# Patient Record
Sex: MALE | Race: WHITE | Employment: FULL TIME | URBAN - METROPOLITAN AREA
[De-identification: names, ages, dates, MRNs, and addresses within clinical notes are randomized per-mention and may not be internally consistent; named-entity substitution may affect disease eponyms.]

---

## 2018-03-02 ENCOUNTER — OFFICE VISIT (OUTPATIENT)
Dept: FAMILY MEDICINE CLINIC | Age: 53
End: 2018-03-02

## 2018-03-02 VITALS
BODY MASS INDEX: 24.45 KG/M2 | SYSTOLIC BLOOD PRESSURE: 128 MMHG | RESPIRATION RATE: 18 BRPM | HEIGHT: 76 IN | WEIGHT: 200.8 LBS | OXYGEN SATURATION: 98 % | TEMPERATURE: 98.2 F | DIASTOLIC BLOOD PRESSURE: 84 MMHG | HEART RATE: 72 BPM

## 2018-03-02 DIAGNOSIS — Z86.718 HISTORY OF DVT (DEEP VEIN THROMBOSIS): ICD-10-CM

## 2018-03-02 DIAGNOSIS — I87.322 CHRONIC VENOUS HYPERTENSION WITH INFLAMMATION INVOLVING LEFT SIDE: ICD-10-CM

## 2018-03-02 DIAGNOSIS — I10 ESSENTIAL HYPERTENSION: ICD-10-CM

## 2018-03-02 DIAGNOSIS — D68.51 FACTOR 5 LEIDEN MUTATION, HETEROZYGOUS (HCC): ICD-10-CM

## 2018-03-02 DIAGNOSIS — Z12.11 SCREEN FOR COLON CANCER: ICD-10-CM

## 2018-03-02 DIAGNOSIS — E11.9 TYPE 2 DIABETES MELLITUS WITHOUT COMPLICATION, WITHOUT LONG-TERM CURRENT USE OF INSULIN (HCC): Primary | ICD-10-CM

## 2018-03-02 PROCEDURE — 99203 OFFICE O/P NEW LOW 30 MIN: CPT | Performed by: FAMILY MEDICINE

## 2018-03-02 RX ORDER — UBIQUINOL 100 MG
CAPSULE ORAL
COMMUNITY
Start: 2017-02-07 | End: 2021-07-16

## 2018-03-02 RX ORDER — LANCETS
EACH MISCELLANEOUS
COMMUNITY
Start: 2017-02-07

## 2018-03-02 RX ORDER — METFORMIN HYDROCHLORIDE 500 MG/1
TABLET, EXTENDED RELEASE ORAL
Qty: 60 TABLET | Refills: 5 | Status: SHIPPED | OUTPATIENT
Start: 2018-03-02 | End: 2018-07-13 | Stop reason: DRUGHIGH

## 2018-03-02 RX ORDER — LOSARTAN POTASSIUM 50 MG/1
TABLET ORAL
COMMUNITY
Start: 2017-11-16 | End: 2018-06-13 | Stop reason: ALTCHOICE

## 2018-03-02 RX ORDER — BLOOD-GLUCOSE METER
1 EACH MISCELLANEOUS
COMMUNITY
Start: 2017-02-07

## 2018-03-02 RX ORDER — METFORMIN HYDROCHLORIDE 500 MG/1
TABLET, EXTENDED RELEASE ORAL
COMMUNITY
Start: 2017-11-16 | End: 2018-03-02 | Stop reason: SDUPTHER

## 2018-03-02 ASSESSMENT — PATIENT HEALTH QUESTIONNAIRE - PHQ9
SUM OF ALL RESPONSES TO PHQ9 QUESTIONS 1 & 2: 0
1. LITTLE INTEREST OR PLEASURE IN DOING THINGS: 0
2. FEELING DOWN, DEPRESSED OR HOPELESS: 0
SUM OF ALL RESPONSES TO PHQ QUESTIONS 1-9: 0

## 2018-03-02 ASSESSMENT — ENCOUNTER SYMPTOMS
SORE THROAT: 0
EYE REDNESS: 0
COLOR CHANGE: 0
SINUS PRESSURE: 0
DIARRHEA: 0
COUGH: 0
SHORTNESS OF BREATH: 0
VOMITING: 0
NAUSEA: 0

## 2018-03-05 ENCOUNTER — TELEPHONE (OUTPATIENT)
Dept: PHARMACY | Facility: CLINIC | Age: 53
End: 2018-03-05

## 2018-03-06 ENCOUNTER — TELEPHONE (OUTPATIENT)
Dept: PHARMACY | Facility: CLINIC | Age: 53
End: 2018-03-06

## 2018-03-09 DIAGNOSIS — E11.9 TYPE 2 DIABETES MELLITUS WITHOUT COMPLICATION, WITHOUT LONG-TERM CURRENT USE OF INSULIN (HCC): ICD-10-CM

## 2018-03-09 LAB
INR BLD: 2.13 (ref 0.85–1.15)
PROTHROMBIN TIME: 24.1 SEC (ref 9.6–13)

## 2018-03-23 RX ORDER — ATENOLOL 50 MG/1
50 TABLET ORAL DAILY
Qty: 30 TABLET | Refills: 5 | Status: SHIPPED | OUTPATIENT
Start: 2018-03-23 | End: 2019-03-20 | Stop reason: SDUPTHER

## 2018-03-23 RX ORDER — ATENOLOL 50 MG/1
50 TABLET ORAL DAILY
Qty: 30 TABLET | Refills: 5 | Status: SHIPPED | OUTPATIENT
Start: 2018-03-23 | End: 2018-03-23 | Stop reason: SDUPTHER

## 2018-03-23 RX ORDER — ATENOLOL 50 MG/1
50 TABLET ORAL DAILY
Qty: 30 TABLET | OUTPATIENT
Start: 2018-03-23

## 2018-03-23 NOTE — TELEPHONE ENCOUNTER
Can we confirm dose with pharmacy first? Pt was unsure at his new patient appt and it is still unclear. Thanks.

## 2018-04-11 DIAGNOSIS — Z86.718 HISTORY OF DVT (DEEP VEIN THROMBOSIS): Primary | ICD-10-CM

## 2018-04-11 DIAGNOSIS — D68.51 FACTOR 5 LEIDEN MUTATION, HETEROZYGOUS (HCC): ICD-10-CM

## 2018-04-11 RX ORDER — WARFARIN SODIUM 5 MG/1
TABLET ORAL
Qty: 45 TABLET | Refills: 1 | Status: SHIPPED | OUTPATIENT
Start: 2018-04-11 | End: 2018-06-27 | Stop reason: SDUPTHER

## 2018-04-11 RX ORDER — WARFARIN SODIUM 5 MG/1
TABLET ORAL
Qty: 30 TABLET | Refills: 3 | OUTPATIENT
Start: 2018-04-11

## 2018-05-21 ENCOUNTER — OFFICE VISIT (OUTPATIENT)
Dept: FAMILY MEDICINE CLINIC | Age: 53
End: 2018-05-21

## 2018-05-21 VITALS
DIASTOLIC BLOOD PRESSURE: 78 MMHG | SYSTOLIC BLOOD PRESSURE: 124 MMHG | BODY MASS INDEX: 24.44 KG/M2 | HEART RATE: 68 BPM | RESPIRATION RATE: 18 BRPM | HEIGHT: 76 IN | OXYGEN SATURATION: 99 % | TEMPERATURE: 98.3 F | WEIGHT: 200.7 LBS

## 2018-05-21 DIAGNOSIS — M25.512 ACUTE PAIN OF LEFT SHOULDER: Primary | ICD-10-CM

## 2018-05-21 DIAGNOSIS — E11.9 TYPE 2 DIABETES MELLITUS WITHOUT COMPLICATION, WITHOUT LONG-TERM CURRENT USE OF INSULIN (HCC): ICD-10-CM

## 2018-05-21 PROCEDURE — 99212 OFFICE O/P EST SF 10 MIN: CPT | Performed by: FAMILY MEDICINE

## 2018-05-21 RX ORDER — HYDROCODONE BITARTRATE AND ACETAMINOPHEN 5; 325 MG/1; MG/1
1 TABLET ORAL EVERY 6 HOURS PRN
Qty: 20 TABLET | Refills: 0 | Status: SHIPPED | OUTPATIENT
Start: 2018-05-21 | End: 2018-06-07 | Stop reason: SDUPTHER

## 2018-05-23 DIAGNOSIS — D68.51 FACTOR 5 LEIDEN MUTATION, HETEROZYGOUS (HCC): ICD-10-CM

## 2018-05-23 DIAGNOSIS — Z86.718 HISTORY OF DVT (DEEP VEIN THROMBOSIS): ICD-10-CM

## 2018-05-23 LAB
INR BLD: 2.76 (ref 0.85–1.15)
PROTHROMBIN TIME: 31.2 SEC (ref 9.6–13)

## 2018-05-24 ENCOUNTER — HOSPITAL ENCOUNTER (OUTPATIENT)
Dept: MRI IMAGING | Age: 53
Discharge: HOME | End: 2018-05-24
Attending: FAMILY MEDICINE | Admitting: FAMILY MEDICINE

## 2018-05-24 ENCOUNTER — TELEPHONE (OUTPATIENT)
Dept: FAMILY MEDICINE CLINIC | Age: 53
End: 2018-05-24

## 2018-05-24 DIAGNOSIS — M25.512 ACUTE PAIN OF LEFT SHOULDER: ICD-10-CM

## 2018-05-24 DIAGNOSIS — M25.512 PAIN IN LEFT SHOULDER: ICD-10-CM

## 2018-06-01 ENCOUNTER — TELEPHONE (OUTPATIENT)
Dept: FAMILY MEDICINE CLINIC | Age: 53
End: 2018-06-01

## 2018-06-01 ENCOUNTER — HOSPITAL ENCOUNTER (OUTPATIENT)
Dept: OTHER | Age: 53
Discharge: HOME | End: 2018-06-30
Attending: FAMILY MEDICINE | Admitting: FAMILY MEDICINE

## 2018-06-01 ENCOUNTER — OFFICE VISIT (OUTPATIENT)
Dept: ORTHOPEDIC SURGERY | Age: 53
End: 2018-06-01

## 2018-06-01 VITALS
HEART RATE: 77 BPM | BODY MASS INDEX: 25.74 KG/M2 | SYSTOLIC BLOOD PRESSURE: 121 MMHG | DIASTOLIC BLOOD PRESSURE: 73 MMHG | WEIGHT: 207 LBS | HEIGHT: 75 IN | RESPIRATION RATE: 16 BRPM

## 2018-06-01 DIAGNOSIS — Z86.718 HISTORY OF DVT (DEEP VEIN THROMBOSIS): ICD-10-CM

## 2018-06-01 DIAGNOSIS — M19.019 AC JOINT ARTHROPATHY: ICD-10-CM

## 2018-06-01 DIAGNOSIS — D68.51 FACTOR 5 LEIDEN MUTATION, HETEROZYGOUS (HCC): Primary | ICD-10-CM

## 2018-06-01 DIAGNOSIS — S46.012A TRAUMATIC COMPLETE TEAR OF LEFT ROTATOR CUFF, INITIAL ENCOUNTER: Primary | ICD-10-CM

## 2018-06-01 DIAGNOSIS — M67.922 BICEPS TENDINOPATHY, LEFT: ICD-10-CM

## 2018-06-01 PROCEDURE — 99214 OFFICE O/P EST MOD 30 MIN: CPT | Performed by: ORTHOPAEDIC SURGERY

## 2018-06-04 ENCOUNTER — OFFICE VISIT (OUTPATIENT)
Dept: FAMILY MEDICINE CLINIC | Age: 53
End: 2018-06-04

## 2018-06-04 ENCOUNTER — TELEPHONE (OUTPATIENT)
Dept: PHARMACY | Facility: CLINIC | Age: 53
End: 2018-06-04

## 2018-06-04 ENCOUNTER — TELEPHONE (OUTPATIENT)
Dept: FAMILY MEDICINE CLINIC | Age: 53
End: 2018-06-04

## 2018-06-04 VITALS
SYSTOLIC BLOOD PRESSURE: 116 MMHG | RESPIRATION RATE: 16 BRPM | HEIGHT: 75 IN | DIASTOLIC BLOOD PRESSURE: 80 MMHG | OXYGEN SATURATION: 100 % | WEIGHT: 207.6 LBS | BODY MASS INDEX: 25.81 KG/M2 | HEART RATE: 76 BPM | TEMPERATURE: 98.3 F

## 2018-06-04 DIAGNOSIS — J06.9 VIRAL URI WITH COUGH: ICD-10-CM

## 2018-06-04 DIAGNOSIS — E11.9 TYPE 2 DIABETES MELLITUS WITHOUT COMPLICATION, WITHOUT LONG-TERM CURRENT USE OF INSULIN (HCC): ICD-10-CM

## 2018-06-04 DIAGNOSIS — M75.122 COMPLETE TEAR OF LEFT ROTATOR CUFF: ICD-10-CM

## 2018-06-04 DIAGNOSIS — D68.51 FACTOR 5 LEIDEN MUTATION, HETEROZYGOUS (HCC): ICD-10-CM

## 2018-06-04 DIAGNOSIS — I10 ESSENTIAL HYPERTENSION: Primary | ICD-10-CM

## 2018-06-04 DIAGNOSIS — Z86.718 HISTORY OF DVT (DEEP VEIN THROMBOSIS): ICD-10-CM

## 2018-06-04 PROCEDURE — 99214 OFFICE O/P EST MOD 30 MIN: CPT | Performed by: FAMILY MEDICINE

## 2018-06-04 RX ORDER — DEXTROMETHORPHAN HYDROBROMIDE AND PROMETHAZINE HYDROCHLORIDE 15; 6.25 MG/5ML; MG/5ML
5 SYRUP ORAL 4 TIMES DAILY PRN
Qty: 1 BOTTLE | Refills: 0 | Status: SHIPPED | OUTPATIENT
Start: 2018-06-04 | End: 2018-06-04 | Stop reason: SDUPTHER

## 2018-06-04 ASSESSMENT — ENCOUNTER SYMPTOMS
COUGH: 1
SHORTNESS OF BREATH: 0
CONSTIPATION: 0
DIARRHEA: 0

## 2018-06-05 ENCOUNTER — TELEPHONE (OUTPATIENT)
Dept: FAMILY MEDICINE CLINIC | Age: 53
End: 2018-06-05

## 2018-06-05 RX ORDER — DEXTROMETHORPHAN HYDROBROMIDE AND PROMETHAZINE HYDROCHLORIDE 15; 6.25 MG/5ML; MG/5ML
5 SYRUP ORAL 4 TIMES DAILY PRN
Qty: 1 BOTTLE | Refills: 0 | Status: SHIPPED | OUTPATIENT
Start: 2018-06-05 | End: 2018-06-12

## 2018-06-06 ENCOUNTER — HOSPITAL ENCOUNTER (OUTPATIENT)
Dept: OTHER | Age: 53
Discharge: HOME | End: 2018-06-30
Attending: ORTHOPAEDIC SURGERY | Admitting: ORTHOPAEDIC SURGERY

## 2018-06-06 NOTE — PLAN OF CARE
[address], [address] [place], [address]  Phone: [phone]   Fax: [phone]          Physical Therapy Certification    Dear Referring Practitioner: Ciro Conner,    We had the pleasure of evaluating the following patient for physical therapy services at [address] [place] [address]. A summary of our findings can be found in the initial assessment below. This includes our plan of care. If you have any questions or concerns regarding these findings, please do not hesitate to contact me at the office phone number checked above. Thank you for the referral.       Physician Signature:_______________________________Date:__________________  By signing above (or electronic signature), therapists plan is approved by physician      Patient: Spencer Bee   : 1965   MRN: [id number]  Referring Physician: Referring Practitioner: Ciro Conner      Evaluation Date: 2018      Medical Diagnosis Information:  Diagnosis: J89.770 (ICD-10-CM) - Tear of left supraspinatus tendon   Treatment Diagnosis: M25.512- left shoulder pain                                           Precautions/ Contra-indications: Factor V, diabetes, HTN, neck fusions  Latex Allergy:  [x]NO      []YES  Preferred Language for Healthcare:   [x]English       []other:    SUBJECTIVE: Patient stated complaint: Pt slipped in wet grass. He fell and tried to catch himself, and he tore his RTC. Pt is RHD. The injury occurred 2 weeks ago. He went to the ED on Saturday evening. He had an x-ray and MRI after that. He went to see Dr. Ciro Conner for ortho consult. They are going to do surgery, but they are going to do it later. He was told by the MD that he has a water pocket in his shoulder that is causing most of his pain. Relevant Medical History:see above. Functional Disability Index:  UEFI    Pain Scale: 8/10  Easing factors: cyclobencpren and hydrocone, which helps.   Pain at measurable assessment of functional outcome.     [x] EVAL (LOW) 95799 (typically 20 minutes face-to-face)  [] EVAL (MOD) 84246 (typically 30 minutes face-to-face)  [] EVAL (HIGH) 35017 (typically 45 minutes face-to-face)  [] Phyllis Muñoz        Electronically signed by:  Price Zuñiga, PT, DPT [id number]

## 2018-06-06 NOTE — FLOWSHEET NOTE
10x:10 Cane ER seated   Table Slides 10x:10 Flex/scap   Wall slides      UE [place]     Pulleys     Pendulum     BB IR 10x:10 Cane    SL IR     Pec doorway stretch     CBA stretch     UT stretch 3x:30    LS stretch 3x:30                        Isometrics     Retraction 10x:10         Weight shift     Flexion     Abduction     External Rotation     Internal Rotation     Biceps     Triceps          PRE's     Flexion     Abduction     External Rotation     Internal Rotation     Shrugs     EXT     Reverse Flys     Serratus     Horizontal Abd with ER     Biceps     Triceps     Retraction          Cable Column/Theraband     External Rotation     Internal Rotation     Shrugs     Lats     Ext     Flex     Scapular Retraction     BIC     TRIC     PNF          Dynamic Stability          Plyoback            Pt Education: Patient education on PT and plan of care including diagnosis, prognosis, treatment goals and options. Patient agrees with discussed POC and treatment and is aware of rehab process. Pt was also educated on clinic layout and use of modalities. PT gave pt business card to call if any questions. Therapeutic Exercise and NMR EXR  [x] (77666) Provided verbal/tactile cueing for activities related to strengthening, flexibility, endurance, ROM  for improvements in scapular, scapulothoracic and UE control with self care, reaching, carrying, lifting, house/yardwork, driving/computer work.    [] (79668) Provided verbal/tactile cueing for activities related to improving balance, coordination, kinesthetic sense, posture, motor skill, proprioception  to assist with  scapular, scapulothoracic and UE control with self care, reaching, carrying, lifting, house/yardwork, driving/computer work.     Therapeutic Activities:    [] (30998 or 68519) Provided verbal/tactile cueing for activities related to improving balance, coordination, kinesthetic sense, posture, motor skill, proprioception and motor activation to allow

## 2018-06-07 DIAGNOSIS — M25.512 ACUTE PAIN OF LEFT SHOULDER: ICD-10-CM

## 2018-06-07 RX ORDER — HYDROCODONE BITARTRATE AND ACETAMINOPHEN 5; 325 MG/1; MG/1
1 TABLET ORAL EVERY 6 HOURS PRN
Qty: 20 TABLET | Refills: 0 | Status: SHIPPED | OUTPATIENT
Start: 2018-06-07 | End: 2018-06-12

## 2018-06-13 ENCOUNTER — ANTI-COAG VISIT (OUTPATIENT)
Dept: PHARMACY | Facility: CLINIC | Age: 53
End: 2018-06-13

## 2018-06-13 LAB — INTERNATIONAL NORMALIZATION RATIO, POC: 4.4

## 2018-06-14 ENCOUNTER — HOSPITAL ENCOUNTER (OUTPATIENT)
Dept: PHYSICAL THERAPY | Age: 53
Discharge: HOME | End: 2018-06-15
Admitting: ORTHOPAEDIC SURGERY

## 2018-06-14 NOTE — FLOWSHEET NOTE
Orthopaedics and [address] [place] [address]      Physical Therapy Daily Treatment Note  Date:  2018    Patient Name:  Daquan Hitchcock    :  1965  MRN: [id number]  Medical/Treatment Diagnosis Information:  · Diagnosis: L81.705 (ICD-10-CM) - Tear of left supraspinatus tendon. Onset- middle May 2018  · Treatment Diagnosis: M25.512- left shoulder pain  Insurance/Certification information:  PT Insurance Information: [employer]  Physician Information:  Referring Practitioner: Malachi Horne  Plan of care signed (Y/N):     Date of Patient follow up with Physician:  Pt to f/u with PCP for return to work    G-Code (if applicable):      Date G-Code Applied:  2018       Progress Note: [x]  Yes  []  No  Next due by: Visit #10 or 2018     Latex Allergy:  [x]NO      []YES  Preferred Language for Healthcare:   [x]English       []other:    Visit # Insurance Allowable   2 40     Pain level:  4/10 at rest, 5/10     SUBJECTIVE:  Pt notes he feels somewhat better but is still fairly sore. He notes that he is able to reach up better to wash hair and is able to use left arm to hold onto steering wheel however he has difficult turning the wheel still.      OBJECTIVE: See eval  Observation:   Test measurements:      ROM PROM AROM  Comment    L R L R    Flexion        Abduction        ER        IR        Other        Other             Strength L R Comment   Flexion      Abduction      ER      IR      Supraspinatus      Upper Trap      Lower Trap      Mid Trap      Rhomboids      Biceps      Triceps      Horizontal Abduction      Horizontal Adduction      Lats          RESTRICTIONS/PRECAUTIONS: Factor V, diabetes, HTN, neck fusions    Exercises/Interventions:   Exercise/Equipment Resistance/Repetitions Other comments   Aerobic Conditioning     Aerodyne          Stretching/PROM     Wand 10x:10 Cane ER supine at 45 deg abd   Table Slides 10x:10 Flex/scap   Wall slides      UE [place]     Pulleys     Pendulum     BB IR 10x:10 for proper function of scapular, scapulothoracic and UE control with self care, carrying, lifting, driving/computer work. Home Exercise Program:    [x] (10862) Reviewed/Progressed HEP activities related to strengthening, flexibility, endurance, ROM of scapular, scapulothoracic and UE control with self care, reaching, carrying, lifting, house/yardwork, driving/computer work  [] (60284) Reviewed/Progressed HEP activities related to improving balance, coordination, kinesthetic sense, posture, motor skill, proprioception of scapular, scapulothoracic and UE control with self care, reaching, carrying, lifting, house/yardwork, driving/computer work      Manual Treatments:  PROM / STM / Oscillations-Mobs:  G-I, II, III, IV (PA's, Inf., Post.)  [] (40557) Provided manual therapy to mobilize soft tissue/joints of cervical/CT, scapular GHJ and UE for the purpose of modulating pain, promoting relaxation,  increasing ROM, reducing/eliminating soft tissue swelling/inflammation/restriction, improving soft tissue extensibility and allowing for proper ROM for normal function with self care, reaching, carrying, lifting, house/yardwork, driving/computer work    Modalities: 15' ice with PreMod stim     Charges:  Timed Code Treatment Minutes: 42'   Total Treatment Minutes: 61'     [] EVAL (LOW) 70457   [] EVAL (MOD) 31925   [] EVAL (HIGH) 53558   [] RE-EVAL   [x] JT(00185) x  1   [] IONTO  [x] NMR (75119) x  1   [] VASO  [] Manual (83037) x       [] Other:  [x] TA x  1    [] Mech Traction (92580)  [] ES(attended) (08787)      [x] ES (un) (20349):     GOALS:  Patient stated goal: 100% back to work and return to [employer] [employer]    Therapist goals for Patient:   Short Term Goals: To be achieved in: 2 weeks  1. Pt will be independent in HEP and progression per patient tolerance, in order to prevent re-injury.    2. Patient will report pain at worst less than or equal to 7/10  to facilitate improvement in movement, function, and ADLs as indicated by dunctional seficits. Long Term Goals: To be achieved in: 8 weeks  1. Pt will demo a UEFI of greater than or equal to 80% to assist with reaching prior level of function. 2. Patient will demonstrate increased AROM shoulder flexion greater than or equal to 140, ABD greater than or equal to 140, IR greater than or equal 40, ER greater than or equal to 70 to allow for proper joint functioning as indicated by patient's functional deficits. 3. Patient will demonstrate an increase in strength to 4- to allow for proper functional mobility as indicated by patient's functional deficits. 4. Patient will return to work without increased symptoms and some restriction. 5. Pt will perform all self care with mod I.    6.  Pt will report pain at worst less than or equal to 5/10. Progression Towards Functional goals:  [] Patient is progressing as expected towards functional goals listed. [] Progression is slowed due to complexities listed. [] Progression has been slowed due to co-morbidities. [x] Plan just implemented, too soon to assess goals progression  [] Other:      ASSESSMENT:  See eval    Treatment/Activity Tolerance:  [x] Patient tolerated treatment well [] Patient limited by fatigue  [] Patient limited by pain  [] Patient limited by other medical complications  [] Other:  Pt able to progress ER stretch and notes this feels good. He was able to perform static UK deltoid but had pain anteriorly with serratus punches so did not continue after 2-3 reps. Continue to progress as tolerated.      Prognosis: [] Good [] Fair  [] Poor    Patient Requires Follow-up: [x] Yes  [] No    PLAN: See eval  [] Continue per plan of care [] Alter current plan (see comments)  [x] Plan of care initiated [] Hold pending MD visit [] Discharge    Electronically signed by: Aleisha Zuleta PT, DPT   Aleisha Zuleta, PT, DPT, Cert JACQUES

## 2018-06-18 ENCOUNTER — HOSPITAL ENCOUNTER (OUTPATIENT)
Dept: PHYSICAL THERAPY | Age: 53
Discharge: HOME | End: 2018-06-19
Admitting: ORTHOPAEDIC SURGERY

## 2018-06-18 NOTE — FLOWSHEET NOTE
Triceps      Horizontal Abduction      Horizontal Adduction      Lats          RESTRICTIONS/PRECAUTIONS: Factor V, diabetes, HTN, neck fusions    Exercises/Interventions:   Exercise/Equipment Resistance/Repetitions Other comments   Aerobic Conditioning     Aerodyne          Stretching/PROM     Wand 10x:10 Cane ER supine at 45 deg abd   Table Slides 10x:10 Flex/scap   Wall slides      UE [place]     Pulleys 10x:0 Flex/scap. Hold NV secondary to pain   Pendulum     BB IR 10x:10 Cane    SL IR     Pec doorway stretch     CBA stretch     UT stretch 3x:30    LS stretch 3x:30                        Isometrics     Retraction          Weight shift     Flexion 10x:10    Abduction External Rotation 10x:10    Internal Rotation 10x:10    Biceps     Triceps          PRE's     Flexion     Abduction     External Rotation     Internal Rotation     Shrugs     EXT     Reverse Flys     Serratus     Horizontal Abd with ER     Biceps     Triceps     Retraction     UK Deltoid Program 5 min Static     Cable Column/Theraband     External Rotation     Internal Rotation     Shrugs     Lats     Ext     Flex     Scapular Retraction 3x10 green    BIC     TRIC     PNF          Dynamic Stability          Plyoback            Pt Education:        Therapeutic Exercise and NMR EXR  [x] (40905) Provided verbal/tactile cueing for activities related to strengthening, flexibility, endurance, ROM  for improvements in scapular, scapulothoracic and UE control with self care, reaching, carrying, lifting, house/yardwork, driving/computer work.    [] (38372) Provided verbal/tactile cueing for activities related to improving balance, coordination, kinesthetic sense, posture, motor skill, proprioception  to assist with  scapular, scapulothoracic and UE control with self care, reaching, carrying, lifting, house/yardwork, driving/computer work.     Therapeutic Activities:    [] (28991 or ) Provided verbal/tactile cueing for activities related to improving Hold pending MD visit [] Discharge    Electronically signed by: Ricky Hernandez DPT [id number]

## 2018-06-20 ENCOUNTER — TELEPHONE (OUTPATIENT)
Dept: FAMILY MEDICINE CLINIC | Age: 53
End: 2018-06-20

## 2018-06-20 ENCOUNTER — HOSPITAL ENCOUNTER (OUTPATIENT)
Dept: PHYSICAL THERAPY | Age: 53
Discharge: HOME | End: 2018-06-21
Admitting: ORTHOPAEDIC SURGERY

## 2018-06-20 ENCOUNTER — ANTI-COAG VISIT (OUTPATIENT)
Dept: PHARMACY | Facility: CLINIC | Age: 53
End: 2018-06-20

## 2018-06-20 LAB — INTERNATIONAL NORMALIZATION RATIO, POC: 1.2

## 2018-06-20 NOTE — TELEPHONE ENCOUNTER
Donya Silverman is calling to say that the pt INR is 1.2 and wants to know if Dr. Tate Mcmillan wanted to give the pt Lovenox to cover the pt until his INR comes back up or do her prefer for them to just give extra Coumadin. Please advise    Thanks.

## 2018-06-20 NOTE — FLOWSHEET NOTE
work.    Therapeutic Activities:    [] (97978 or 02994) Provided verbal/tactile cueing for activities related to improving balance, coordination, kinesthetic sense, posture, motor skill, proprioception and motor activation to allow for proper function of scapular, scapulothoracic and UE control with self care, carrying, lifting, driving/computer work. Home Exercise Program:    [x] (72299) Reviewed/Progressed HEP activities related to strengthening, flexibility, endurance, ROM of scapular, scapulothoracic and UE control with self care, reaching, carrying, lifting, house/yardwork, driving/computer work  [] (78135) Reviewed/Progressed HEP activities related to improving balance, coordination, kinesthetic sense, posture, motor skill, proprioception of scapular, scapulothoracic and UE control with self care, reaching, carrying, lifting, house/yardwork, driving/computer work      Manual Treatments:  PROM / STM / Oscillations-Mobs:  G-I, II, III, IV (PA's, Inf., Post.)  [] (44883) Provided manual therapy to mobilize soft tissue/joints of cervical/CT, scapular GHJ and UE for the purpose of modulating pain, promoting relaxation,  increasing ROM, reducing/eliminating soft tissue swelling/inflammation/restriction, improving soft tissue extensibility and allowing for proper ROM for normal function with self care, reaching, carrying, lifting, house/yardwork, driving/computer work    Modalities: Declined    Charges:    Timed Code Treatment Minutes: 45'   Total Treatment Minutes: 45'     [] EVAL (LOW) 16831   [] EVAL (MOD) 79957   [] EVAL (HIGH) 07157   [] RE-EVAL   [x] HP(23294) x  1   [] IONTO  [x] NMR (80259) x  1   [] VASO  [] Manual (58259) x       [] Other:  [x] TA x  1    [] Mech Traction (23511)  [] ES(attended) (60712)      [x] ES (un) (37567):     GOALS:  Patient stated goal: 100% back to work and return to [facility] [employer]    Therapist goals for Patient:   Short Term Goals: To be achieved in: 2 weeks  1.  Pt will be independent elevating his arm and the pain he reports. Despite reporting high level of pain today, pt was able to perform exercises with only a couple moments of sudden pain. He did not appear in distress when entering the clinic. Prognosis: [] Good [] Fair  [] Poor    Patient Requires Follow-up: [x] Yes  [] No    PLAN: Progress per pt julien.   [x] Continue per plan of care [] Alter current plan (see comments)  [] Plan of care initiated [] Hold pending MD visit [] Discharge    Electronically signed by: Ellie Hutchins, DPT [id number]

## 2018-06-20 NOTE — TELEPHONE ENCOUNTER
Would like to cover with Lovenox until his INR goes back up. Will they cover that there at the [facility] or do I need to write for this?

## 2018-06-21 ENCOUNTER — TELEPHONE (OUTPATIENT)
Dept: FAMILY MEDICINE CLINIC | Age: 53
End: 2018-06-21

## 2018-06-21 NOTE — TELEPHONE ENCOUNTER
Spoke with pharmacy. They stated cash price for RX for levonox is 160$ and that he needs a prior auth. They could not tell me if there was anything else on the formulary. Sending to prior auth dept to get this started for the pt.

## 2018-06-22 ENCOUNTER — TELEPHONE (OUTPATIENT)
Dept: FAMILY MEDICINE CLINIC | Age: 53
End: 2018-06-22

## 2018-06-22 ENCOUNTER — TELEPHONE (OUTPATIENT)
Dept: PHARMACY | Facility: CLINIC | Age: 53
End: 2018-06-22

## 2018-06-22 NOTE — TELEPHONE ENCOUNTER
Pharm called in regards to a pa for enoxaparin syringe.   They want to know the status of it        Please advise

## 2018-06-25 ENCOUNTER — ANTI-COAG VISIT (OUTPATIENT)
Dept: PHARMACY | Facility: CLINIC | Age: 53
End: 2018-06-25

## 2018-06-25 ENCOUNTER — HOSPITAL ENCOUNTER (OUTPATIENT)
Dept: PHYSICAL THERAPY | Age: 53
Discharge: HOME | End: 2018-06-26
Admitting: ORTHOPAEDIC SURGERY

## 2018-06-25 DIAGNOSIS — D68.51 FACTOR 5 LEIDEN MUTATION, HETEROZYGOUS (HCC): ICD-10-CM

## 2018-06-25 LAB — INTERNATIONAL NORMALIZATION RATIO, POC: 1.9

## 2018-06-25 NOTE — FLOWSHEET NOTE
improving balance, coordination, kinesthetic sense, posture, motor skill, proprioception and motor activation to allow for proper function of scapular, scapulothoracic and UE control with self care, carrying, lifting, driving/computer work. Home Exercise Program:    [x] (91161) Reviewed/Progressed HEP activities related to strengthening, flexibility, endurance, ROM of scapular, scapulothoracic and UE control with self care, reaching, carrying, lifting, house/yardwork, driving/computer work  [] (31139) Reviewed/Progressed HEP activities related to improving balance, coordination, kinesthetic sense, posture, motor skill, proprioception of scapular, scapulothoracic and UE control with self care, reaching, carrying, lifting, house/yardwork, driving/computer work      Manual Treatments:  PROM / STM / Oscillations-Mobs:  G-I, II, III, IV (PA's, Inf., Post.)  [] (01363) Provided manual therapy to mobilize soft tissue/joints of cervical/CT, scapular GHJ and UE for the purpose of modulating pain, promoting relaxation,  increasing ROM, reducing/eliminating soft tissue swelling/inflammation/restriction, improving soft tissue extensibility and allowing for proper ROM for normal function with self care, reaching, carrying, lifting, house/yardwork, driving/computer work    Modalities: Declined    Charges:    Timed Code Treatment Minutes: 25'   Total Treatment Minutes: 50'     [] EVAL (LOW) 82710   [] EVAL (MOD) 02872   [] EVAL (HIGH) 30349   [] RE-EVAL   [x] EI(47617) x  1   [] IONTO  [] NMR (16364) x      [] VASO  [] Manual (66804) x       [] Other:  [x] TA x  1    [] Mech Traction (78681)  [] ES(attended) (06265)      [] ES (un) (81679):     GOALS:  Patient stated goal: 100% back to work and return to [employer]    Therapist goals for Patient:   Short Term Goals: To be achieved in: 2 weeks  1. Pt will be independent in HEP and progression per patient tolerance, in order to prevent re-injury.    2. Patient will report pain at worst less than or equal to 7/10  to facilitate improvement in movement, function, and ADLs as indicated by dunctional seficits. Long Term Goals: To be achieved in: 8 weeks  1. Pt will demo a UEFI of greater than or equal to 80% to assist with reaching prior level of function. 2. Patient will demonstrate increased AROM shoulder flexion greater than or equal to 140, ABD greater than or equal to 140, IR greater than or equal 40, ER greater than or equal to 70 to allow for proper joint functioning as indicated by patient's functional deficits. 3. Patient will demonstrate an increase in strength to 4- to allow for proper functional mobility as indicated by patient's functional deficits. 4. Patient will return to work without increased symptoms and some restriction. 5. Pt will perform all self care with mod I.    6.  Pt will report pain at worst less than or equal to 5/10. Progression Towards Functional goals:  [] Patient is progressing as expected towards functional goals listed. [] Progression is slowed due to complexities listed. [] Progression has been slowed due to co-morbidities. [x] Plan just implemented, too soon to assess goals progression  [] Other:      ASSESSMENT:      Treatment/Activity Tolerance:  [x] Patient tolerated treatment well [] Patient limited by fatigue  [x] Patient limited by pain  [] Patient limited by other medical complications  [] Other:  Pt julien tx fair/well. He does have crepitus with shrugs in his right shoulder/neck. We did discuss extensively the prognosis of tx his shoulder surgically versus non surgically, more specifically we discussed recovery and protocol. I ed him on the importance of making sure he is healthy enough to go in to surgery. He has been getting injections for his INR, and he showed me the bruises on his abdomen. He continues to struggle with his exercises due to the tear. However, he does demo good compliance with HEP.    Pt was reminded of surgeon's

## 2018-06-26 DIAGNOSIS — M25.512 ACUTE PAIN OF LEFT SHOULDER: ICD-10-CM

## 2018-06-26 RX ORDER — HYDROCODONE BITARTRATE AND ACETAMINOPHEN 5; 325 MG/1; MG/1
1 TABLET ORAL EVERY 6 HOURS PRN
Qty: 20 TABLET | Refills: 0 | OUTPATIENT
Start: 2018-06-26 | End: 2018-07-01

## 2018-06-27 DIAGNOSIS — D68.51 FACTOR 5 LEIDEN MUTATION, HETEROZYGOUS (HCC): ICD-10-CM

## 2018-06-27 DIAGNOSIS — Z86.718 HISTORY OF DVT (DEEP VEIN THROMBOSIS): ICD-10-CM

## 2018-06-28 ENCOUNTER — TELEPHONE (OUTPATIENT)
Dept: FAMILY MEDICINE CLINIC | Age: 53
End: 2018-06-28

## 2018-06-28 ENCOUNTER — HOSPITAL ENCOUNTER (OUTPATIENT)
Dept: PHYSICAL THERAPY | Age: 53
Discharge: HOME | End: 2018-06-29
Admitting: ORTHOPAEDIC SURGERY

## 2018-06-28 RX ORDER — WARFARIN SODIUM 5 MG/1
TABLET ORAL
Qty: 45 TABLET | Refills: 0 | Status: SHIPPED | OUTPATIENT
Start: 2018-06-28 | End: 2018-07-30 | Stop reason: SDUPTHER

## 2018-07-01 ENCOUNTER — HOSPITAL ENCOUNTER (OUTPATIENT)
Dept: OTHER | Age: 53
Discharge: HOME | End: 2018-07-31
Attending: ORTHOPAEDIC SURGERY | Admitting: ORTHOPAEDIC SURGERY

## 2018-07-01 ENCOUNTER — HOSPITAL ENCOUNTER (OUTPATIENT)
Dept: OTHER | Age: 53
Discharge: HOME | End: 2018-07-31
Attending: FAMILY MEDICINE | Admitting: FAMILY MEDICINE

## 2018-07-03 ENCOUNTER — TELEPHONE (OUTPATIENT)
Dept: ORTHOPEDIC SURGERY | Age: 53
End: 2018-07-03

## 2018-07-03 NOTE — TELEPHONE ENCOUNTER
SUBMITTED A PA VIA [facility] FOR   Enoxaparin Sodium 100MG/ML SC SOLN  Key: [id number]   STATUS: AVAILABLE WITH OUT A PRIOR AUTH

## 2018-07-06 ENCOUNTER — ANTI-COAG VISIT (OUTPATIENT)
Dept: PHARMACY | Facility: CLINIC | Age: 53
End: 2018-07-06

## 2018-07-06 DIAGNOSIS — D68.51 FACTOR 5 LEIDEN MUTATION, HETEROZYGOUS (HCC): ICD-10-CM

## 2018-07-06 LAB — INTERNATIONAL NORMALIZATION RATIO, POC: 2.1

## 2018-07-10 ENCOUNTER — TELEPHONE (OUTPATIENT)
Dept: FAMILY MEDICINE CLINIC | Age: 53
End: 2018-07-10

## 2018-07-12 ENCOUNTER — OFFICE VISIT (OUTPATIENT)
Dept: ORTHOPEDIC SURGERY | Age: 53
End: 2018-07-12

## 2018-07-12 VITALS
WEIGHT: 209 LBS | DIASTOLIC BLOOD PRESSURE: 88 MMHG | HEIGHT: 75 IN | BODY MASS INDEX: 25.99 KG/M2 | HEART RATE: 80 BPM | SYSTOLIC BLOOD PRESSURE: 136 MMHG

## 2018-07-12 DIAGNOSIS — S46.012A TRAUMATIC COMPLETE TEAR OF LEFT ROTATOR CUFF, INITIAL ENCOUNTER: Primary | ICD-10-CM

## 2018-07-12 PROCEDURE — 99213 OFFICE O/P EST LOW 20 MIN: CPT | Performed by: ORTHOPAEDIC SURGERY

## 2018-07-13 ENCOUNTER — OFFICE VISIT (OUTPATIENT)
Dept: FAMILY MEDICINE CLINIC | Age: 53
End: 2018-07-13

## 2018-07-13 VITALS
SYSTOLIC BLOOD PRESSURE: 122 MMHG | RESPIRATION RATE: 18 BRPM | TEMPERATURE: 98 F | DIASTOLIC BLOOD PRESSURE: 78 MMHG | OXYGEN SATURATION: 99 % | HEART RATE: 86 BPM | HEIGHT: 75 IN | WEIGHT: 208 LBS | BODY MASS INDEX: 25.86 KG/M2

## 2018-07-13 DIAGNOSIS — Z12.11 SCREEN FOR COLON CANCER: ICD-10-CM

## 2018-07-13 DIAGNOSIS — J30.2 CHRONIC SEASONAL ALLERGIC RHINITIS, UNSPECIFIED TRIGGER: ICD-10-CM

## 2018-07-13 DIAGNOSIS — Z72.0 TOBACCO USE: ICD-10-CM

## 2018-07-13 DIAGNOSIS — Z13.220 SCREENING FOR HYPERLIPIDEMIA: ICD-10-CM

## 2018-07-13 DIAGNOSIS — J06.9 VIRAL URI WITH COUGH: Primary | ICD-10-CM

## 2018-07-13 DIAGNOSIS — R73.03 PREDIABETES: ICD-10-CM

## 2018-07-13 PROBLEM — E11.9 TYPE 2 DIABETES MELLITUS WITHOUT COMPLICATION, WITHOUT LONG-TERM CURRENT USE OF INSULIN (HCC): Status: RESOLVED | Noted: 2018-03-02 | Resolved: 2018-07-13

## 2018-07-13 LAB — HBA1C MFR BLD: 5.8 %

## 2018-07-13 PROCEDURE — 83036 HEMOGLOBIN GLYCOSYLATED A1C: CPT | Performed by: FAMILY MEDICINE

## 2018-07-13 PROCEDURE — 99214 OFFICE O/P EST MOD 30 MIN: CPT | Performed by: FAMILY MEDICINE

## 2018-07-13 RX ORDER — PROMETHAZINE HYDROCHLORIDE AND CODEINE PHOSPHATE 6.25; 1 MG/5ML; MG/5ML
5 SOLUTION ORAL EVERY 4 HOURS PRN
Qty: 280 ML | Refills: 0 | Status: SHIPPED | OUTPATIENT
Start: 2018-07-13 | End: 2018-07-23

## 2018-07-13 RX ORDER — METFORMIN HYDROCHLORIDE 500 MG/1
TABLET, EXTENDED RELEASE ORAL
Qty: 30 TABLET | Refills: 5 | Status: SHIPPED
Start: 2018-07-13 | End: 2018-09-04 | Stop reason: SDUPTHER

## 2018-07-13 ASSESSMENT — PATIENT HEALTH QUESTIONNAIRE - PHQ9
SUM OF ALL RESPONSES TO PHQ9 QUESTIONS 1 & 2: 0
2. FEELING DOWN, DEPRESSED OR HOPELESS: 0
1. LITTLE INTEREST OR PLEASURE IN DOING THINGS: 0
SUM OF ALL RESPONSES TO PHQ QUESTIONS 1-9: 0

## 2018-07-13 ASSESSMENT — ENCOUNTER SYMPTOMS
COUGH: 1
SHORTNESS OF BREATH: 0

## 2018-07-13 NOTE — PROGRESS NOTES
 Years of education: N/A     Occupational History    [employer]      Social History Main Topics    Smoking status: Current Every Day Smoker     Packs/day: 0.50     Types: Cigarettes    Smokeless tobacco: Never Used    Alcohol use Yes      Comment: social    Drug use: No    Sexual activity: Not on file     Other Topics Concern    Not on file     Social History Narrative    No narrative on file       Family History   Problem Relation Age of Onset    Diabetes Mother     High Blood Pressure Mother     Cancer Father         Bladder Cancer    Heart Disease Father     High Blood Pressure Father     Colon Cancer Neg Hx     Prostate Cancer Neg Hx        Current Outpatient Prescriptions   Medication Sig Dispense Refill    metFORMIN (GLUCOPHAGE-XR) 500 MG extended release tablet TAKE ONE TABLET BY MOUTH ONCE DAILY WITH A MEAL 30 tablet 5    promethazine-codeine (PHENERGAN WITH CODEINE) 6.25-10 MG/5ML SOLN solution Take 5 mLs by mouth every 4 hours as needed for Cough 280 mL 0    warfarin (COUMADIN) 5 MG tablet TAKE TWO TABLETS (10 MG) BY MOUTH MONDAY, WEDNESDAY AND FRIDAY. TAKE ONE TABLET (5 MG) BY MOUTH ALL OTHER DAYS. 45 tablet 0    Acetaminophen-Caffeine (EXCEDRIN TENSION HEADACHE) 500-65 MG TABS Take 2 tablets by mouth daily      atenolol (TENORMIN) 50 MG tablet Take 1 tablet by mouth daily 30 tablet 5    Alcohol Swabs (ALCOHOL PREP) 70 % PADS Apply topically      Blood Glucose Monitoring Suppl (ACCU-CHEK DOMITILA PLUS) w/Device KIT 1 kit by Does not apply route      Lancets Thin MISC Test 2 time daily or as directed.  glucose blood VI test strips (ASCENSIA AUTODISC VI;ONE TOUCH ULTRA TEST VI) strip Place inside cheek      esomeprazole Magnesium (NEXIUM) 40 MG PACK Take 40 mg by mouth daily. No current facility-administered medications for this visit.         No Known Allergies    /78 (Site: Right Arm, Position: Sitting, Cuff Size: Medium Adult)   Pulse 86   Temp 98 °F (36.7 °C) (Oral)

## 2018-07-23 ENCOUNTER — TELEPHONE (OUTPATIENT)
Dept: PHARMACY | Facility: CLINIC | Age: 53
End: 2018-07-23

## 2018-07-23 NOTE — TELEPHONE ENCOUNTER
Left message for patient to please call back to reschedule missed appointment.     Sujata Limon   Anticoagulation Service / Heart Failure  [phone]

## 2018-07-26 ENCOUNTER — ANTI-COAG VISIT (OUTPATIENT)
Dept: PHARMACY | Facility: CLINIC | Age: 53
End: 2018-07-26

## 2018-07-26 DIAGNOSIS — D68.51 FACTOR 5 LEIDEN MUTATION, HETEROZYGOUS (HCC): ICD-10-CM

## 2018-07-26 LAB — INR BLD: 1.7

## 2018-07-26 NOTE — PROGRESS NOTES
Mr. Carissa Vargas is a 50 y.o.  male with history of factor 5 leiden mutation who presents today for anticoagulation monitoring and adjustment. Patient verifies current dosing regimen  Patient denies s/s bleeding/bruising/swelling/SOB  No blood in urine or stool. No dietary changes. No changes in medication/OTC agents/Herbals. No change in alcohol use. No missed doses. No Procedures scheduled in the future at this time. Lab Results   Component Value Date    INR 1.70 07/26/2018    INR 2.1 07/06/2018    INR 1.9 06/25/2018       Pertinent findings: Patient appears well, denies changes to medications or diet. Reports he is considering shoulder surgery in the future but was told he would have to quit smoking first. Reminded patient to let us know if he ever changes frequency of smoking as it will impact INR and to let us know about any scheduled procedures. INR is a little low today, may be due to missed dose this week, but has been on low range for several visits. Will increase dose by 10% and recheck INR in 3 weeks. Warfarin dosing: NEW DOSE: Take Warfarin 10 mg (2 tablets) every day except 5 mg (1 tablets) every Monday, Wednesday and Friday. After visit summary printed and reviewed with patient.         Warfarin dose updated on patient home medication list: Yes

## 2018-07-30 DIAGNOSIS — Z86.718 HISTORY OF DVT (DEEP VEIN THROMBOSIS): ICD-10-CM

## 2018-07-30 DIAGNOSIS — D68.51 FACTOR 5 LEIDEN MUTATION, HETEROZYGOUS (HCC): ICD-10-CM

## 2018-07-31 RX ORDER — WARFARIN SODIUM 5 MG/1
TABLET ORAL
Qty: 45 TABLET | Refills: 1 | Status: SHIPPED | OUTPATIENT
Start: 2018-07-31 | End: 2018-10-29 | Stop reason: SDUPTHER

## 2018-08-01 ENCOUNTER — HOSPITAL ENCOUNTER (OUTPATIENT)
Dept: OTHER | Age: 53
Discharge: HOME | End: 2018-08-31
Attending: FAMILY MEDICINE | Admitting: FAMILY MEDICINE

## 2018-08-09 ENCOUNTER — TELEPHONE (OUTPATIENT)
Dept: PHARMACY | Facility: CLINIC | Age: 53
End: 2018-08-09

## 2018-08-10 ENCOUNTER — ANTI-COAG VISIT (OUTPATIENT)
Dept: PHARMACY | Facility: CLINIC | Age: 53
End: 2018-08-10

## 2018-08-10 DIAGNOSIS — D68.51 FACTOR 5 LEIDEN MUTATION, HETEROZYGOUS (HCC): ICD-10-CM

## 2018-08-10 LAB — INTERNATIONAL NORMALIZATION RATIO, POC: 2.3

## 2018-08-23 ENCOUNTER — OFFICE VISIT (OUTPATIENT)
Dept: FAMILY MEDICINE CLINIC | Age: 53
End: 2018-08-23

## 2018-08-23 VITALS
DIASTOLIC BLOOD PRESSURE: 80 MMHG | BODY MASS INDEX: 26.25 KG/M2 | RESPIRATION RATE: 18 BRPM | TEMPERATURE: 98.1 F | OXYGEN SATURATION: 98 % | HEART RATE: 64 BPM | WEIGHT: 210 LBS | SYSTOLIC BLOOD PRESSURE: 124 MMHG

## 2018-08-23 DIAGNOSIS — J01.90 ACUTE RHINOSINUSITIS: Primary | ICD-10-CM

## 2018-08-23 PROCEDURE — 99213 OFFICE O/P EST LOW 20 MIN: CPT | Performed by: NURSE PRACTITIONER

## 2018-08-23 RX ORDER — FLUTICASONE PROPIONATE 50 MCG
2 SPRAY, SUSPENSION (ML) NASAL DAILY
Qty: 1 BOTTLE | Refills: 0 | Status: SHIPPED | OUTPATIENT
Start: 2018-08-23 | End: 2019-02-22 | Stop reason: SDUPTHER

## 2018-08-23 RX ORDER — AMOXICILLIN AND CLAVULANATE POTASSIUM 875; 125 MG/1; MG/1
1 TABLET, FILM COATED ORAL 2 TIMES DAILY
Qty: 20 TABLET | Refills: 0 | Status: SHIPPED | OUTPATIENT
Start: 2018-08-23 | End: 2018-09-02

## 2018-08-23 ASSESSMENT — ENCOUNTER SYMPTOMS
DIARRHEA: 0
RHINORRHEA: 1
WHEEZING: 0
COUGH: 1
SORE THROAT: 0
NAUSEA: 0
VOMITING: 0
CHEST TIGHTNESS: 0
SHORTNESS OF BREATH: 0
SINUS PAIN: 1
SINUS PRESSURE: 1

## 2018-08-23 NOTE — LETTER
[address]  [address] [place] [address]  Phone: [phone]  Fax: [phone]    HALEY Morris CNP        August 23, 2018     Patient: Rolan Nielson   YOB: 1965   Date of Visit: 8/23/2018       To Whom it May Concern:    Sunni Springer was seen in my clinic on 8/23/2018. He may return to work on 8/27/18. If you have any questions or concerns, please don't hesitate to call.     Sincerely,     HALEY Morris CNP

## 2018-08-23 NOTE — PROGRESS NOTES
No Known Allergies    Review of Systems   Constitutional: Positive for activity change and fatigue. Negative for fever. HENT: Positive for congestion, postnasal drip, rhinorrhea, sinus pain and sinus pressure. Negative for ear pain and sore throat. Respiratory: Positive for cough. Negative for chest tightness, shortness of breath and wheezing. Cardiovascular: Negative for chest pain. Gastrointestinal: Negative for diarrhea, nausea and vomiting. Neurological: Positive for headaches. Negative for dizziness. Vitals:    08/23/18 1541   BP: 124/80   Site: Right Arm   Position: Sitting   Cuff Size: Medium Adult   Pulse: 64   Resp: 18   Temp: 98.1 °F (36.7 °C)   TempSrc: Oral   SpO2: 98%   Weight: 210 lb (95.3 kg)       Body mass index is 26.25 kg/m². Wt Readings from Last 3 Encounters:   08/23/18 210 lb (95.3 kg)   07/13/18 208 lb (94.3 kg)   07/12/18 209 lb (94.8 kg)       BP Readings from Last 3 Encounters:   08/23/18 124/80   07/13/18 122/78   07/12/18 136/88       Physical Exam   Constitutional: He is oriented to person, place, and time. He appears well-developed and well-nourished. No distress. HENT:   Head: Normocephalic and atraumatic. Right Ear: Tympanic membrane, external ear and ear canal normal. Tympanic membrane is not erythematous and not bulging. Left Ear: Tympanic membrane, external ear and ear canal normal. Tympanic membrane is not erythematous and not bulging. Nose: Right sinus exhibits maxillary sinus tenderness. Right sinus exhibits no frontal sinus tenderness. Left sinus exhibits maxillary sinus tenderness. Left sinus exhibits no frontal sinus tenderness. Mouth/Throat: Uvula is midline. Posterior oropharyngeal erythema present. No oropharyngeal exudate. Eyes: EOM are normal.   Neck: Neck supple. Cardiovascular: Normal rate, regular rhythm and normal heart sounds. Exam reveals no gallop and no friction rub. No murmur heard.   Pulmonary/Chest: Effort normal

## 2018-09-01 ENCOUNTER — HOSPITAL ENCOUNTER (OUTPATIENT)
Dept: OTHER | Age: 53
Discharge: HOME | End: 2018-09-01
Attending: FAMILY MEDICINE | Admitting: FAMILY MEDICINE

## 2018-09-04 RX ORDER — METFORMIN HYDROCHLORIDE 500 MG/1
TABLET, EXTENDED RELEASE ORAL
Qty: 30 TABLET | Refills: 5 | Status: SHIPPED | OUTPATIENT
Start: 2018-09-04 | End: 2019-05-30 | Stop reason: SDUPTHER

## 2018-09-04 NOTE — TELEPHONE ENCOUNTER
Dr. Karma Beckford we got a fax from pt's pharmacy stating that pt told them there was a dose change to 3tabs qd. If this is how you want him taking metformin could you please update and send new RX?       Thank You

## 2018-09-07 ENCOUNTER — TELEPHONE (OUTPATIENT)
Dept: PHARMACY | Facility: CLINIC | Age: 53
End: 2018-09-07

## 2018-09-07 NOTE — TELEPHONE ENCOUNTER
Left message for patient to please call back to reschedule missed appointment.     Zac Schmidt, [address] [facility]  Anticoagulation Service  [phone]

## 2018-09-18 ENCOUNTER — TELEPHONE (OUTPATIENT)
Dept: PHARMACY | Facility: CLINIC | Age: 53
End: 2018-09-18

## 2018-09-21 ENCOUNTER — ANTI-COAG VISIT (OUTPATIENT)
Dept: PHARMACY | Facility: CLINIC | Age: 53
End: 2018-09-21

## 2018-09-21 DIAGNOSIS — D68.51 FACTOR 5 LEIDEN MUTATION, HETEROZYGOUS (HCC): ICD-10-CM

## 2018-09-21 LAB — INTERNATIONAL NORMALIZATION RATIO, POC: 1.8

## 2018-09-21 NOTE — PROGRESS NOTES
Mr. Demarcus Cho is a 50 y.o.  male with history of Factor V Leiden mutation who presents today for anticoagulation monitoring and adjustment. Patient verifies current dosing regimen. Patient denies s/s bleeding/bruising/swelling/SOB. No blood in urine or stool. No dietary changes. No change in alcohol use. No Procedures scheduled in the future at this time. Lab Results   Component Value Date    INR 1.8 09/21/2018    INR 2.3 08/10/2018    INR 1.70 07/26/2018       Patient appears well. No changes, no problems. Mr. Olga Felix states he was on an antibiotic which he finished about 3 weeks ago. He missed a couple doses of warfarin. Coumadin Dose :   Take Warfarin 7.5mg (1 & 1/2 tablets) today only. Then continue Warfarin 10 mg (2 tablets) every day except 5 mg (1 tablets) every Monday, Wednesday and Friday. Return in 2 weeks. Patient reminded to call the Anticoagulation Clinic with any medication changes. Patient given instructions utilizing the teach back method. After visit summary printed and reviewed with patient. Warfarin dose updated.

## 2018-10-19 ENCOUNTER — OFFICE VISIT (OUTPATIENT)
Dept: FAMILY MEDICINE CLINIC | Age: 53
End: 2018-10-19
Payer: COMMERCIAL

## 2018-10-19 VITALS
DIASTOLIC BLOOD PRESSURE: 82 MMHG | OXYGEN SATURATION: 99 % | WEIGHT: 209.4 LBS | BODY MASS INDEX: 26.04 KG/M2 | TEMPERATURE: 98.3 F | HEART RATE: 84 BPM | SYSTOLIC BLOOD PRESSURE: 128 MMHG | RESPIRATION RATE: 16 BRPM | HEIGHT: 75 IN

## 2018-10-19 DIAGNOSIS — Z23 NEEDS FLU SHOT: ICD-10-CM

## 2018-10-19 DIAGNOSIS — H02.843 SWELLING OF EYELID, RIGHT: Primary | ICD-10-CM

## 2018-10-19 PROCEDURE — 99213 OFFICE O/P EST LOW 20 MIN: CPT | Performed by: FAMILY MEDICINE

## 2018-10-19 RX ORDER — OLOPATADINE HYDROCHLORIDE 2 MG/ML
1 SOLUTION/ DROPS OPHTHALMIC DAILY
Qty: 1 BOTTLE | Refills: 0 | Status: SHIPPED | OUTPATIENT
Start: 2018-10-19 | End: 2019-03-11

## 2018-10-19 ASSESSMENT — ENCOUNTER SYMPTOMS
SINUS PRESSURE: 1
SHORTNESS OF BREATH: 0
RHINORRHEA: 1

## 2018-10-22 ENCOUNTER — ANTI-COAG VISIT (OUTPATIENT)
Dept: PHARMACY | Age: 53
End: 2018-10-22
Payer: COMMERCIAL

## 2018-10-22 DIAGNOSIS — D68.51 FACTOR 5 LEIDEN MUTATION, HETEROZYGOUS (HCC): ICD-10-CM

## 2018-10-22 LAB — INTERNATIONAL NORMALIZATION RATIO, POC: 1.7

## 2018-10-22 PROCEDURE — 99211 OFF/OP EST MAY X REQ PHY/QHP: CPT

## 2018-10-22 PROCEDURE — 85610 PROTHROMBIN TIME: CPT

## 2018-10-29 ENCOUNTER — ANTI-COAG VISIT (OUTPATIENT)
Dept: PHARMACY | Age: 53
End: 2018-10-29
Payer: COMMERCIAL

## 2018-10-29 DIAGNOSIS — D68.51 FACTOR 5 LEIDEN MUTATION, HETEROZYGOUS (HCC): ICD-10-CM

## 2018-10-29 DIAGNOSIS — Z86.718 HISTORY OF DVT (DEEP VEIN THROMBOSIS): ICD-10-CM

## 2018-10-29 LAB — INTERNATIONAL NORMALIZATION RATIO, POC: 1.4

## 2018-10-29 PROCEDURE — 99211 OFF/OP EST MAY X REQ PHY/QHP: CPT

## 2018-10-29 PROCEDURE — 85610 PROTHROMBIN TIME: CPT

## 2018-10-29 RX ORDER — WARFARIN SODIUM 10 MG/1
10 TABLET ORAL DAILY
Qty: 35 TABLET | Refills: 3 | Status: SHIPPED | OUTPATIENT
Start: 2018-10-29 | End: 2019-01-11 | Stop reason: DRUGHIGH

## 2018-10-29 RX ORDER — WARFARIN SODIUM 5 MG/1
10 TABLET ORAL DAILY
Qty: 60 TABLET | Refills: 2 | Status: SHIPPED | OUTPATIENT
Start: 2018-10-29 | End: 2018-10-29 | Stop reason: DRUGHIGH

## 2019-01-05 ENCOUNTER — APPOINTMENT (OUTPATIENT)
Dept: GENERAL RADIOLOGY | Age: 54
End: 2019-01-05
Payer: COMMERCIAL

## 2019-01-05 ENCOUNTER — HOSPITAL ENCOUNTER (EMERGENCY)
Age: 54
Discharge: HOME | End: 2019-01-05
Payer: COMMERCIAL

## 2019-01-05 VITALS
HEART RATE: 80 BPM | SYSTOLIC BLOOD PRESSURE: 132 MMHG | OXYGEN SATURATION: 100 % | HEIGHT: 76 IN | DIASTOLIC BLOOD PRESSURE: 78 MMHG | WEIGHT: 208.78 LBS | BODY MASS INDEX: 25.42 KG/M2 | TEMPERATURE: 97.7 F | RESPIRATION RATE: 16 BRPM
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
## 2019-01-05 DIAGNOSIS — S61.258A DOG BITE OF INDEX FINGER, INITIAL ENCOUNTER: Primary | ICD-10-CM

## 2019-01-05 PROCEDURE — 6370000000 HC RX 637 (ALT 250 FOR IP): Performed by: NURSE PRACTITIONER

## 2019-01-05 PROCEDURE — 99283 EMERGENCY DEPT VISIT LOW MDM: CPT

## 2019-01-05 PROCEDURE — 73130 X-RAY EXAM OF HAND: CPT

## 2019-01-05 PROCEDURE — 90471 IMMUNIZATION ADMIN: CPT | Performed by: NURSE PRACTITIONER

## 2019-01-05 PROCEDURE — 90715 TDAP VACCINE 7 YRS/> IM: CPT | Performed by: NURSE PRACTITIONER

## 2019-01-05 PROCEDURE — 6360000002 HC RX W HCPCS: Performed by: NURSE PRACTITIONER

## 2019-01-05 RX ORDER — AMOXICILLIN AND CLAVULANATE POTASSIUM 875; 125 MG/1; MG/1
1 TABLET, FILM COATED ORAL 2 TIMES DAILY
Qty: 20 TABLET | Refills: 0 | Status: SHIPPED | OUTPATIENT
Start: 2019-01-05 | End: 2019-01-15

## 2019-01-05 RX ORDER — IBUPROFEN 800 MG/1
800 TABLET ORAL EVERY 8 HOURS PRN
Qty: 30 TABLET | Refills: 0 | Status: SHIPPED | OUTPATIENT
Start: 2019-01-05 | End: 2019-05-21

## 2019-01-05 RX ORDER — AMOXICILLIN AND CLAVULANATE POTASSIUM 875; 125 MG/1; MG/1
1 TABLET, FILM COATED ORAL ONCE
Status: COMPLETED | OUTPATIENT
Start: 2019-01-05 | End: 2019-01-05

## 2019-01-05 RX ORDER — HYDROCODONE BITARTRATE AND ACETAMINOPHEN 5; 325 MG/1; MG/1
1 TABLET ORAL EVERY 6 HOURS PRN
Qty: 15 TABLET | Refills: 0 | Status: SHIPPED | OUTPATIENT
Start: 2019-01-05 | End: 2019-01-08

## 2019-01-05 RX ADMIN — AMOXICILLIN AND CLAVULANATE POTASSIUM 1 TABLET: 875; 125 TABLET, FILM COATED ORAL at 21:42

## 2019-01-05 RX ADMIN — TETANUS TOXOID, REDUCED DIPHTHERIA TOXOID AND ACELLULAR PERTUSSIS VACCINE, ADSORBED 0.5 ML: 5; 2.5; 8; 8; 2.5 SUSPENSION INTRAMUSCULAR at 21:42

## 2019-01-05 ASSESSMENT — PAIN SCALES - GENERAL
PAINLEVEL_OUTOF10: 4
PAINLEVEL_OUTOF10: 4

## 2019-01-05 ASSESSMENT — ENCOUNTER SYMPTOMS: COLOR CHANGE: 1

## 2019-01-07 ENCOUNTER — TELEPHONE (OUTPATIENT)
Dept: FAMILY MEDICINE CLINIC | Age: 54
End: 2019-01-07

## 2019-01-08 ENCOUNTER — ANTI-COAG VISIT (OUTPATIENT)
Dept: PHARMACY | Age: 54
End: 2019-01-08
Payer: COMMERCIAL

## 2019-01-08 ENCOUNTER — OFFICE VISIT (OUTPATIENT)
Dept: FAMILY MEDICINE CLINIC | Age: 54
End: 2019-01-08
Payer: COMMERCIAL

## 2019-01-08 VITALS
SYSTOLIC BLOOD PRESSURE: 120 MMHG | TEMPERATURE: 98.1 F | WEIGHT: 206 LBS | RESPIRATION RATE: 18 BRPM | DIASTOLIC BLOOD PRESSURE: 80 MMHG | BODY MASS INDEX: 25.08 KG/M2 | HEART RATE: 91 BPM
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
## 2019-01-08 DIAGNOSIS — D68.51 FACTOR 5 LEIDEN MUTATION, HETEROZYGOUS (HCC): ICD-10-CM

## 2019-01-08 DIAGNOSIS — L03.011 CELLULITIS OF RIGHT INDEX FINGER: ICD-10-CM

## 2019-01-08 LAB
INR BLD: 6.93 (ref 0.86–1.14)
INTERNATIONAL NORMALIZATION RATIO, POC: >8
PROTHROMBIN TIME: 79 SEC (ref 9.8–13)

## 2019-01-08 PROCEDURE — 36415 COLL VENOUS BLD VENIPUNCTURE: CPT

## 2019-01-08 PROCEDURE — 85610 PROTHROMBIN TIME: CPT

## 2019-01-08 PROCEDURE — 99213 OFFICE O/P EST LOW 20 MIN: CPT

## 2019-01-08 PROCEDURE — 99213 OFFICE O/P EST LOW 20 MIN: CPT | Performed by: NURSE PRACTITIONER

## 2019-01-08 ASSESSMENT — ENCOUNTER SYMPTOMS
NAUSEA: 0
SHORTNESS OF BREATH: 0
VOMITING: 0
COUGH: 0
COLOR CHANGE: 1
DIARRHEA: 0

## 2019-01-09 ENCOUNTER — ANTI-COAG VISIT (OUTPATIENT)
Dept: PHARMACY | Age: 54
End: 2019-01-09
Payer: COMMERCIAL

## 2019-01-09 LAB — INTERNATIONAL NORMALIZATION RATIO, POC: 6.1

## 2019-01-09 PROCEDURE — 99211 OFF/OP EST MAY X REQ PHY/QHP: CPT

## 2019-01-09 PROCEDURE — 85610 PROTHROMBIN TIME: CPT

## 2019-01-10 ENCOUNTER — OFFICE VISIT (OUTPATIENT)
Dept: FAMILY MEDICINE CLINIC | Age: 54
End: 2019-01-10
Payer: COMMERCIAL

## 2019-01-10 VITALS
RESPIRATION RATE: 16 BRPM | HEIGHT: 76 IN | SYSTOLIC BLOOD PRESSURE: 110 MMHG | HEART RATE: 78 BPM | OXYGEN SATURATION: 100 % | DIASTOLIC BLOOD PRESSURE: 76 MMHG | WEIGHT: 213.6 LBS | TEMPERATURE: 98.2 F | BODY MASS INDEX: 26.01 KG/M2
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
## 2019-01-10 DIAGNOSIS — I10 ESSENTIAL HYPERTENSION: ICD-10-CM

## 2019-01-10 DIAGNOSIS — Z86.718 HISTORY OF DVT (DEEP VEIN THROMBOSIS): ICD-10-CM

## 2019-01-10 DIAGNOSIS — R79.1 ELEVATED INR: ICD-10-CM

## 2019-01-10 PROCEDURE — 99214 OFFICE O/P EST MOD 30 MIN: CPT | Performed by: FAMILY MEDICINE

## 2019-01-10 RX ORDER — HYDROCODONE BITARTRATE AND ACETAMINOPHEN 5; 325 MG/1; MG/1
1 TABLET ORAL EVERY 6 HOURS PRN
Qty: 10 TABLET | Refills: 0 | Status: SHIPPED | OUTPATIENT
Start: 2019-01-10 | End: 2019-01-13

## 2019-01-10 ASSESSMENT — PATIENT HEALTH QUESTIONNAIRE - PHQ9
SUM OF ALL RESPONSES TO PHQ9 QUESTIONS 1 & 2: 0
SUM OF ALL RESPONSES TO PHQ QUESTIONS 1-9: 0
SUM OF ALL RESPONSES TO PHQ QUESTIONS 1-9: 0
1. LITTLE INTEREST OR PLEASURE IN DOING THINGS: 0
2. FEELING DOWN, DEPRESSED OR HOPELESS: 0

## 2019-01-11 ENCOUNTER — ANTI-COAG VISIT (OUTPATIENT)
Dept: PHARMACY | Age: 54
End: 2019-01-11
Payer: COMMERCIAL

## 2019-01-11 DIAGNOSIS — D68.51 FACTOR 5 LEIDEN MUTATION, HETEROZYGOUS (HCC): ICD-10-CM

## 2019-01-11 LAB
GRAM STAIN RESULT: NORMAL
INR BLD: 1.7
WOUND/ABSCESS: NORMAL

## 2019-01-11 PROCEDURE — 99211 OFF/OP EST MAY X REQ PHY/QHP: CPT

## 2019-01-11 PROCEDURE — 85610 PROTHROMBIN TIME: CPT

## 2019-01-11 RX ORDER — WARFARIN SODIUM 10 MG/1
10 TABLET ORAL DAILY
COMMUNITY
End: 2019-01-24 | Stop reason: SDUPTHER

## 2019-01-11 ASSESSMENT — ENCOUNTER SYMPTOMS: COUGH: 0

## 2019-01-14 ENCOUNTER — TELEPHONE (OUTPATIENT)
Dept: FAMILY MEDICINE CLINIC | Age: 54
End: 2019-01-14

## 2019-01-15 ENCOUNTER — TELEPHONE (OUTPATIENT)
Dept: FAMILY MEDICINE CLINIC | Age: 54
End: 2019-01-15

## 2019-01-16 ENCOUNTER — TELEPHONE (OUTPATIENT)
Dept: FAMILY MEDICINE CLINIC | Age: 54
End: 2019-01-16

## 2019-01-24 ENCOUNTER — ANTI-COAG VISIT (OUTPATIENT)
Dept: PHARMACY | Age: 54
End: 2019-01-24
Payer: COMMERCIAL

## 2019-01-24 DIAGNOSIS — D68.51 FACTOR 5 LEIDEN MUTATION, HETEROZYGOUS (HCC): ICD-10-CM

## 2019-01-24 LAB — INR BLD: 4.5

## 2019-01-24 PROCEDURE — 99211 OFF/OP EST MAY X REQ PHY/QHP: CPT

## 2019-01-24 PROCEDURE — 85610 PROTHROMBIN TIME: CPT

## 2019-01-24 RX ORDER — WARFARIN SODIUM 10 MG/1
TABLET ORAL
Qty: 30 TABLET | Refills: 1 | OUTPATIENT
Start: 2019-01-24 | End: 2019-06-24 | Stop reason: SDUPTHER

## 2019-01-24 RX ORDER — WARFARIN SODIUM 10 MG/1
TABLET ORAL
Qty: 30 TABLET | Refills: 1 | Status: SHIPPED | OUTPATIENT
Start: 2019-01-24 | End: 2019-01-24 | Stop reason: CLARIF

## 2019-01-24 RX ORDER — WARFARIN SODIUM 10 MG/1
TABLET ORAL
Qty: 30 TABLET | Refills: 3 | Status: SHIPPED | OUTPATIENT
Start: 2019-01-24 | End: 2019-01-24 | Stop reason: CLARIF

## 2019-01-28 ENCOUNTER — TELEPHONE (OUTPATIENT)
Dept: FAMILY MEDICINE CLINIC | Age: 54
End: 2019-01-28

## 2019-02-05 ENCOUNTER — ANTI-COAG VISIT (OUTPATIENT)
Dept: PHARMACY | Age: 54
End: 2019-02-05
Payer: COMMERCIAL

## 2019-02-05 DIAGNOSIS — D68.51 FACTOR 5 LEIDEN MUTATION, HETEROZYGOUS (HCC): ICD-10-CM

## 2019-02-05 LAB — INTERNATIONAL NORMALIZATION RATIO, POC: 2.6

## 2019-02-05 PROCEDURE — 99211 OFF/OP EST MAY X REQ PHY/QHP: CPT

## 2019-02-05 PROCEDURE — 85610 PROTHROMBIN TIME: CPT

## 2019-02-22 ENCOUNTER — OFFICE VISIT (OUTPATIENT)
Dept: FAMILY MEDICINE CLINIC | Age: 54
End: 2019-02-22
Payer: COMMERCIAL

## 2019-02-22 VITALS
WEIGHT: 213 LBS | BODY MASS INDEX: 25.93 KG/M2 | HEART RATE: 86 BPM | TEMPERATURE: 97.7 F | RESPIRATION RATE: 18 BRPM | SYSTOLIC BLOOD PRESSURE: 120 MMHG | DIASTOLIC BLOOD PRESSURE: 70 MMHG

## 2019-02-22 DIAGNOSIS — J06.9 VIRAL URI WITH COUGH: Primary | ICD-10-CM

## 2019-02-22 PROCEDURE — 99213 OFFICE O/P EST LOW 20 MIN: CPT | Performed by: NURSE PRACTITIONER

## 2019-02-22 RX ORDER — DEXTROMETHORPHAN HYDROBROMIDE AND PROMETHAZINE HYDROCHLORIDE 15; 6.25 MG/5ML; MG/5ML
5 SYRUP ORAL 4 TIMES DAILY PRN
Qty: 140 ML | Refills: 0 | Status: SHIPPED | OUTPATIENT
Start: 2019-02-22 | End: 2019-03-01

## 2019-02-22 RX ORDER — FLUTICASONE PROPIONATE 50 MCG
2 SPRAY, SUSPENSION (ML) NASAL DAILY
Qty: 1 BOTTLE | Refills: 0 | Status: SHIPPED | OUTPATIENT
Start: 2019-02-22 | End: 2020-05-18

## 2019-02-22 ASSESSMENT — ENCOUNTER SYMPTOMS
SINUS PAIN: 1
VOMITING: 0
NAUSEA: 0
SHORTNESS OF BREATH: 0
WHEEZING: 0
SORE THROAT: 0
CHEST TIGHTNESS: 0
RHINORRHEA: 1
DIARRHEA: 0
COUGH: 1
SINUS PRESSURE: 1

## 2019-02-25 ENCOUNTER — ANTI-COAG VISIT (OUTPATIENT)
Dept: PHARMACY | Age: 54
End: 2019-02-25
Payer: COMMERCIAL

## 2019-02-25 DIAGNOSIS — D68.51 FACTOR 5 LEIDEN MUTATION, HETEROZYGOUS (HCC): ICD-10-CM

## 2019-02-25 LAB — INTERNATIONAL NORMALIZATION RATIO, POC: 4.4

## 2019-02-25 PROCEDURE — 99211 OFF/OP EST MAY X REQ PHY/QHP: CPT

## 2019-02-25 PROCEDURE — 85610 PROTHROMBIN TIME: CPT

## 2019-03-11 ENCOUNTER — ANTI-COAG VISIT (OUTPATIENT)
Dept: PHARMACY | Age: 54
End: 2019-03-11
Payer: COMMERCIAL

## 2019-03-11 ENCOUNTER — APPOINTMENT (OUTPATIENT)
Dept: GENERAL RADIOLOGY | Age: 54
End: 2019-03-11
Payer: COMMERCIAL

## 2019-03-11 ENCOUNTER — HOSPITAL ENCOUNTER (EMERGENCY)
Age: 54
Discharge: HOME | End: 2019-03-11
Payer: COMMERCIAL

## 2019-03-11 VITALS
HEART RATE: 86 BPM | SYSTOLIC BLOOD PRESSURE: 132 MMHG | HEIGHT: 76 IN | WEIGHT: 204.59 LBS | TEMPERATURE: 98.1 F | DIASTOLIC BLOOD PRESSURE: 88 MMHG | RESPIRATION RATE: 16 BRPM | OXYGEN SATURATION: 100 % | BODY MASS INDEX: 24.91 KG/M2

## 2019-03-11 DIAGNOSIS — J06.9 URI WITH COUGH AND CONGESTION: Primary | ICD-10-CM

## 2019-03-11 DIAGNOSIS — D68.51 FACTOR 5 LEIDEN MUTATION, HETEROZYGOUS (HCC): ICD-10-CM

## 2019-03-11 LAB — INTERNATIONAL NORMALIZATION RATIO, POC: 5.4

## 2019-03-11 PROCEDURE — 99211 OFF/OP EST MAY X REQ PHY/QHP: CPT

## 2019-03-11 PROCEDURE — 99283 EMERGENCY DEPT VISIT LOW MDM: CPT

## 2019-03-11 PROCEDURE — 71046 X-RAY EXAM CHEST 2 VIEWS: CPT

## 2019-03-11 PROCEDURE — 85610 PROTHROMBIN TIME: CPT

## 2019-03-11 PROCEDURE — 6370000000 HC RX 637 (ALT 250 FOR IP): Performed by: NURSE PRACTITIONER

## 2019-03-11 RX ORDER — DEXTROMETHORPHAN HYDROBROMIDE AND PROMETHAZINE HYDROCHLORIDE 15; 6.25 MG/5ML; MG/5ML
5 SYRUP ORAL 4 TIMES DAILY PRN
Qty: 118 ML | Refills: 0 | Status: SHIPPED | OUTPATIENT
Start: 2019-03-11 | End: 2019-03-11 | Stop reason: SDUPTHER

## 2019-03-11 RX ORDER — AMOXICILLIN AND CLAVULANATE POTASSIUM 875; 125 MG/1; MG/1
1 TABLET, FILM COATED ORAL 2 TIMES DAILY
Qty: 20 TABLET | Refills: 0 | Status: SHIPPED | OUTPATIENT
Start: 2019-03-11 | End: 2019-03-11 | Stop reason: SDUPTHER

## 2019-03-11 RX ORDER — AMOXICILLIN AND CLAVULANATE POTASSIUM 875; 125 MG/1; MG/1
1 TABLET, FILM COATED ORAL 2 TIMES DAILY
Qty: 20 TABLET | Refills: 0 | Status: SHIPPED | OUTPATIENT
Start: 2019-03-11 | End: 2019-03-21

## 2019-03-11 RX ORDER — PROMETHAZINE HYDROCHLORIDE AND CODEINE PHOSPHATE 6.25; 1 MG/5ML; MG/5ML
10 SOLUTION ORAL ONCE
Status: COMPLETED | OUTPATIENT
Start: 2019-03-11 | End: 2019-03-11

## 2019-03-11 RX ORDER — DEXTROMETHORPHAN HYDROBROMIDE AND PROMETHAZINE HYDROCHLORIDE 15; 6.25 MG/5ML; MG/5ML
5 SYRUP ORAL 4 TIMES DAILY PRN
Qty: 118 ML | Refills: 0 | Status: SHIPPED | OUTPATIENT
Start: 2019-03-11 | End: 2020-01-17 | Stop reason: SDUPTHER

## 2019-03-11 RX ADMIN — Medication 10 ML: at 23:36

## 2019-03-11 ASSESSMENT — ENCOUNTER SYMPTOMS
SINUS PRESSURE: 1
ABDOMINAL PAIN: 0
COUGH: 1
VOMITING: 0
DIARRHEA: 0
FACIAL SWELLING: 0
COLOR CHANGE: 0
SORE THROAT: 0
RHINORRHEA: 1
NAUSEA: 0
SHORTNESS OF BREATH: 0

## 2019-03-11 ASSESSMENT — PAIN DESCRIPTION - PAIN TYPE: TYPE: CHRONIC PAIN

## 2019-03-11 ASSESSMENT — PAIN DESCRIPTION - ORIENTATION: ORIENTATION: LOWER

## 2019-03-11 ASSESSMENT — PAIN SCALES - GENERAL
PAINLEVEL_OUTOF10: 7
PAINLEVEL_OUTOF10: 3

## 2019-03-11 ASSESSMENT — PAIN DESCRIPTION - FREQUENCY: FREQUENCY: CONTINUOUS

## 2019-03-11 ASSESSMENT — PAIN DESCRIPTION - DESCRIPTORS: DESCRIPTORS: ACHING

## 2019-03-11 ASSESSMENT — PAIN DESCRIPTION - LOCATION: LOCATION: NECK

## 2019-03-12 ENCOUNTER — TELEPHONE (OUTPATIENT)
Dept: PHARMACY | Age: 54
End: 2019-03-12

## 2019-03-18 ENCOUNTER — ANTI-COAG VISIT (OUTPATIENT)
Dept: PHARMACY | Age: 54
End: 2019-03-18
Payer: COMMERCIAL

## 2019-03-18 DIAGNOSIS — D68.51 FACTOR 5 LEIDEN MUTATION, HETEROZYGOUS (HCC): ICD-10-CM

## 2019-03-18 LAB — INR BLD: 2

## 2019-03-18 PROCEDURE — 99211 OFF/OP EST MAY X REQ PHY/QHP: CPT

## 2019-03-18 PROCEDURE — 85610 PROTHROMBIN TIME: CPT

## 2019-03-22 ENCOUNTER — OFFICE VISIT (OUTPATIENT)
Dept: FAMILY MEDICINE CLINIC | Age: 54
End: 2019-03-22
Payer: COMMERCIAL

## 2019-03-22 VITALS
BODY MASS INDEX: 24.71 KG/M2 | DIASTOLIC BLOOD PRESSURE: 86 MMHG | WEIGHT: 203 LBS | SYSTOLIC BLOOD PRESSURE: 120 MMHG | RESPIRATION RATE: 18 BRPM | HEART RATE: 79 BPM | TEMPERATURE: 97.6 F | OXYGEN SATURATION: 99 %

## 2019-03-22 DIAGNOSIS — J30.89 NON-SEASONAL ALLERGIC RHINITIS, UNSPECIFIED TRIGGER: Primary | ICD-10-CM

## 2019-03-22 PROCEDURE — 99213 OFFICE O/P EST LOW 20 MIN: CPT | Performed by: NURSE PRACTITIONER

## 2019-03-22 RX ORDER — MONTELUKAST SODIUM 10 MG/1
10 TABLET ORAL DAILY
Qty: 30 TABLET | Refills: 1 | Status: SHIPPED | OUTPATIENT
Start: 2019-03-22 | End: 2020-05-18

## 2019-03-22 ASSESSMENT — ENCOUNTER SYMPTOMS
RHINORRHEA: 1
SORE THROAT: 0
WHEEZING: 0
SHORTNESS OF BREATH: 0
COUGH: 1
SINUS PRESSURE: 1

## 2019-04-02 ENCOUNTER — TELEPHONE (OUTPATIENT)
Dept: PHARMACY | Age: 54
End: 2019-04-02

## 2019-04-02 NOTE — TELEPHONE ENCOUNTER
Left message for patient to please call back to reschedule missed appointment.     Hershey Skill, [address] [facility]  Anticoagulation Service / Heart Failure  [phone]

## 2019-04-03 ENCOUNTER — ANTI-COAG VISIT (OUTPATIENT)
Dept: PHARMACY | Age: 54
End: 2019-04-03
Payer: COMMERCIAL

## 2019-04-03 LAB — INTERNATIONAL NORMALIZATION RATIO, POC: 2.5

## 2019-04-03 PROCEDURE — 85610 PROTHROMBIN TIME: CPT

## 2019-04-03 PROCEDURE — 99211 OFF/OP EST MAY X REQ PHY/QHP: CPT

## 2019-05-03 ENCOUNTER — ANTI-COAG VISIT (OUTPATIENT)
Dept: PHARMACY | Age: 54
End: 2019-05-03
Payer: COMMERCIAL

## 2019-05-03 LAB — INTERNATIONAL NORMALIZATION RATIO, POC: 2.4

## 2019-05-03 PROCEDURE — 85610 PROTHROMBIN TIME: CPT

## 2019-05-03 PROCEDURE — 99211 OFF/OP EST MAY X REQ PHY/QHP: CPT

## 2019-05-03 NOTE — PROGRESS NOTES
Mr. Ariel Hawkins is a 48 y.o.  male with history of factor V Leiden mutation who presents today for anticoagulation monitoring and adjustment. Patient verifies current dosing regimen  Patient denies s/s bleeding/bruising/swelling/SOB  No blood in urine or stool. No dietary changes. No changes in medication/OTC agents/Herbals. No change in alcohol use. No missed doses. No Procedures scheduled in the future at this time. Lab Results   Component Value Date    INR 2.4 05/03/2019    INR 2.5 04/03/2019    INR 2 03/18/2019       Pertinent findings: Looks and feels well today. No changes in medications or diet. No bruising or bleeding noted. No change in dose for INR of  2.4. Will see in 4 weeks for next INR. Warfarin dosing: Continue:Warfarin 5 mg every day except Warfarin 10 mg ( one-half tablet) on Sunday and Thursday      After visit summary printed and reviewed with patient.

## 2019-05-21 DIAGNOSIS — D68.51 FACTOR 5 LEIDEN MUTATION, HETEROZYGOUS (HCC): ICD-10-CM

## 2019-05-21 RX ORDER — WARFARIN SODIUM 10 MG/1
TABLET ORAL
OUTPATIENT
Start: 2019-05-21

## 2019-05-21 RX ORDER — WARFARIN SODIUM 10 MG/1
TABLET ORAL
Qty: 30 TABLET | Refills: 3 | Status: SHIPPED | OUTPATIENT
Start: 2019-05-21 | End: 2019-11-01 | Stop reason: SDUPTHER

## 2019-05-31 ENCOUNTER — TELEPHONE (OUTPATIENT)
Dept: FAMILY MEDICINE CLINIC | Age: 54
End: 2019-05-31

## 2019-05-31 ENCOUNTER — ANTI-COAG VISIT (OUTPATIENT)
Dept: PHARMACY | Age: 54
End: 2019-05-31
Payer: COMMERCIAL

## 2019-05-31 DIAGNOSIS — I10 ESSENTIAL HYPERTENSION: Primary | ICD-10-CM

## 2019-05-31 DIAGNOSIS — D68.51 FACTOR 5 LEIDEN MUTATION, HETEROZYGOUS (HCC): ICD-10-CM

## 2019-05-31 DIAGNOSIS — R73.03 PREDIABETES: ICD-10-CM

## 2019-05-31 LAB — INTERNATIONAL NORMALIZATION RATIO, POC: 2.5

## 2019-05-31 PROCEDURE — 85610 PROTHROMBIN TIME: CPT

## 2019-05-31 PROCEDURE — 99211 OFF/OP EST MAY X REQ PHY/QHP: CPT

## 2019-05-31 RX ORDER — METFORMIN HYDROCHLORIDE 500 MG/1
TABLET, EXTENDED RELEASE ORAL
Qty: 30 TABLET | Refills: 4 | Status: SHIPPED | OUTPATIENT
Start: 2019-05-31 | End: 2020-06-26

## 2019-05-31 NOTE — PROGRESS NOTES
Mr. Jose Camacho is a 48 y.o.  male with history of Factor V Leiden who presents today for anticoagulation monitoring and adjustment. Patient verifies current dosing regimen. Patient denies s/s bleeding/bruising/swelling/SOB. No blood in urine or stool. No dietary changes. No changes in medication/OTC agents/Herbals. No change in alcohol use. No Procedures scheduled in the future at this time. Lab Results   Component Value Date    INR 2.5 05/31/2019    INR 2.4 05/03/2019    INR 2.5 04/03/2019       Patient appears well. No changes, no problems. Patient reports missing 2 dose over the holiday weekend due to running out of medication. Coumadin Dose :   Missed 2 doses over the holiday, ran out of medication. Return in 4 weeks. Patient reminded to call the Anticoagulation Clinic with any medication changes. Patient given instructions utilizing the teach back method. After visit summary printed and reviewed with patient. Medications reviewed: Patient states no changes. Warfarin dose updated.

## 2019-06-24 ENCOUNTER — ANTI-COAG VISIT (OUTPATIENT)
Dept: PHARMACY | Age: 54
End: 2019-06-24
Payer: COMMERCIAL

## 2019-06-24 DIAGNOSIS — D68.51 FACTOR 5 LEIDEN MUTATION, HETEROZYGOUS (HCC): ICD-10-CM

## 2019-06-24 LAB — INTERNATIONAL NORMALIZATION RATIO, POC: 3

## 2019-06-24 PROCEDURE — 85610 PROTHROMBIN TIME: CPT

## 2019-06-24 PROCEDURE — 99211 OFF/OP EST MAY X REQ PHY/QHP: CPT

## 2019-06-24 NOTE — PROGRESS NOTES
Mr. Moris Valentine is a 48 y.o.  male with history of Factor V Leiden mutation who presents today for anticoagulation monitoring and adjustment. Patient verifies current dosing regimen  Patient denies s/s bleeding/bruising/swelling/SOB  No blood in urine or stool. No dietary changes. No changes in medication/OTC agents/Herbals. No change in alcohol use. No missed doses. No Procedures scheduled in the future at this time. Lab Results   Component Value Date    INR 3.0 06/24/2019    INR 2.5 05/31/2019    INR 2.4 05/03/2019       Pertinent findings: Patient appears well, no changes    Warfarin dosing:   Continue:Warfarin 5 mg every day except Warfarin 10 mg ( one-half tablet) on Sunday and Thursday    After visit summary printed and reviewed with patient.       Medications reviewed and updated on home medication list: Yes, patient denied any changes  Warfarin dose updated on patient home medication list: Yes

## 2019-06-28 RX ORDER — ATENOLOL 50 MG/1
TABLET ORAL
Qty: 30 TABLET | Refills: 3 | Status: SHIPPED | OUTPATIENT
Start: 2019-06-28 | End: 2019-11-01

## 2019-07-03 ENCOUNTER — TELEPHONE (OUTPATIENT)
Dept: FAMILY MEDICINE CLINIC | Age: 54
End: 2019-07-03

## 2019-07-03 NOTE — TELEPHONE ENCOUNTER
Pt would like to ask the dr some questions about his blood glucose numbers. He is supposed to have his a1c checked and he is supposed to fast and he is worried that he is going to get shaky. He states that he is going to need to drink some orange juice to help him not get to shaky.  He is going to go to [employer] and get a $10 meter and the goodies that go with it and start testing his sugar levels in the morning when he gets up and after breakfast.    Please advise    Thanks

## 2019-07-05 DIAGNOSIS — I10 ESSENTIAL HYPERTENSION: ICD-10-CM

## 2019-07-05 DIAGNOSIS — R73.03 PREDIABETES: ICD-10-CM

## 2019-07-05 LAB
A/G RATIO: 1.8 (ref 1.1–2.2)
ALBUMIN SERPL-MCNC: 4.5 G/DL (ref 3.4–5)
ALP BLD-CCNC: 68 U/L (ref 40–129)
ALT SERPL-CCNC: 18 U/L (ref 10–40)
ANION GAP SERPL CALCULATED.3IONS-SCNC: 11 MMOL/L (ref 3–16)
AST SERPL-CCNC: 17 U/L (ref 15–37)
BILIRUB SERPL-MCNC: 0.4 MG/DL (ref 0–1)
BUN BLDV-MCNC: 16 MG/DL (ref 7–20)
CALCIUM SERPL-MCNC: 9.6 MG/DL (ref 8.3–10.6)
CHLORIDE BLD-SCNC: 105 MMOL/L (ref 99–110)
CHOLESTEROL, TOTAL: 221 MG/DL (ref 0–199)
CO2: 23 MMOL/L (ref 21–32)
CREAT SERPL-MCNC: 0.8 MG/DL (ref 0.9–1.3)
GFR AFRICAN AMERICAN: >60
GFR NON-AFRICAN AMERICAN: >60
GLOBULIN: 2.5 G/DL
GLUCOSE BLD-MCNC: 144 MG/DL (ref 70–99)
HDLC SERPL-MCNC: 32 MG/DL (ref 40–60)
LDL CHOLESTEROL CALCULATED: 149 MG/DL
POTASSIUM SERPL-SCNC: 4.1 MMOL/L (ref 3.5–5.1)
SODIUM BLD-SCNC: 139 MMOL/L (ref 136–145)
TOTAL PROTEIN: 7 G/DL (ref 6.4–8.2)
TRIGL SERPL-MCNC: 202 MG/DL (ref 0–150)
TSH SERPL DL<=0.05 MIU/L-ACNC: 1.97 UIU/ML (ref 0.27–4.2)
VLDLC SERPL CALC-MCNC: 40 MG/DL

## 2019-07-06 LAB
ESTIMATED AVERAGE GLUCOSE: 122.6 MG/DL
HBA1C MFR BLD: 5.9 %

## 2019-07-12 ENCOUNTER — TELEPHONE (OUTPATIENT)
Dept: FAMILY MEDICINE CLINIC | Age: 54
End: 2019-07-12

## 2019-07-12 NOTE — TELEPHONE ENCOUNTER
I sent him a letter on 7/8/19. It may need a couple more days to get there. If he needs details it would be ok to read what I wrote on the letter, otherwise should be there soon. Thanks.

## 2019-07-22 ENCOUNTER — ANTI-COAG VISIT (OUTPATIENT)
Dept: PHARMACY | Age: 54
End: 2019-07-22

## 2019-07-22 DIAGNOSIS — D68.51 FACTOR 5 LEIDEN MUTATION, HETEROZYGOUS (HCC): ICD-10-CM

## 2019-07-22 LAB — INTERNATIONAL NORMALIZATION RATIO, POC: 1.1

## 2019-07-22 PROCEDURE — 99211 OFF/OP EST MAY X REQ PHY/QHP: CPT

## 2019-07-22 PROCEDURE — 85610 PROTHROMBIN TIME: CPT

## 2019-08-08 ENCOUNTER — TELEPHONE (OUTPATIENT)
Dept: PHARMACY | Age: 54
End: 2019-08-08

## 2019-08-19 ENCOUNTER — APPOINTMENT (OUTPATIENT)
Dept: PHARMACY | Age: 54
End: 2019-08-19

## 2019-08-19 ENCOUNTER — ANTI-COAG VISIT (OUTPATIENT)
Dept: PHARMACY | Age: 54
End: 2019-08-19

## 2019-08-19 DIAGNOSIS — D68.51 FACTOR 5 LEIDEN MUTATION, HETEROZYGOUS (HCC): ICD-10-CM

## 2019-08-19 LAB — INTERNATIONAL NORMALIZATION RATIO, POC: 2.6

## 2019-08-19 PROCEDURE — 99211 OFF/OP EST MAY X REQ PHY/QHP: CPT

## 2019-08-19 PROCEDURE — 85610 PROTHROMBIN TIME: CPT

## 2019-09-12 ENCOUNTER — ANTI-COAG VISIT (OUTPATIENT)
Dept: PHARMACY | Age: 54
End: 2019-09-12
Payer: COMMERCIAL

## 2019-09-12 DIAGNOSIS — D68.51 FACTOR 5 LEIDEN MUTATION, HETEROZYGOUS (HCC): ICD-10-CM

## 2019-09-12 LAB — INTERNATIONAL NORMALIZATION RATIO, POC: 1.9

## 2019-09-13 PROCEDURE — 99211 OFF/OP EST MAY X REQ PHY/QHP: CPT

## 2019-09-13 PROCEDURE — 85610 PROTHROMBIN TIME: CPT

## 2019-09-20 ENCOUNTER — TELEPHONE (OUTPATIENT)
Dept: PHARMACY | Age: 54
End: 2019-09-20

## 2019-09-27 ENCOUNTER — ANTI-COAG VISIT (OUTPATIENT)
Dept: PHARMACY | Age: 54
End: 2019-09-27
Payer: COMMERCIAL

## 2019-09-27 DIAGNOSIS — D68.51 FACTOR 5 LEIDEN MUTATION, HETEROZYGOUS (HCC): ICD-10-CM

## 2019-09-27 LAB — INTERNATIONAL NORMALIZATION RATIO, POC: 3.7

## 2019-09-27 PROCEDURE — 85610 PROTHROMBIN TIME: CPT

## 2019-09-27 PROCEDURE — 99211 OFF/OP EST MAY X REQ PHY/QHP: CPT

## 2019-10-25 ENCOUNTER — ANTI-COAG VISIT (OUTPATIENT)
Dept: PHARMACY | Age: 54
End: 2019-10-25
Payer: COMMERCIAL

## 2019-10-25 DIAGNOSIS — D68.51 FACTOR 5 LEIDEN MUTATION, HETEROZYGOUS (HCC): ICD-10-CM

## 2019-10-25 LAB — INTERNATIONAL NORMALIZATION RATIO, POC: 2.1

## 2019-10-25 PROCEDURE — 99211 OFF/OP EST MAY X REQ PHY/QHP: CPT

## 2019-10-25 PROCEDURE — 85610 PROTHROMBIN TIME: CPT

## 2019-11-01 ENCOUNTER — OFFICE VISIT (OUTPATIENT)
Dept: FAMILY MEDICINE CLINIC | Age: 54
End: 2019-11-01
Payer: COMMERCIAL

## 2019-11-01 VITALS
BODY MASS INDEX: 25.35 KG/M2 | RESPIRATION RATE: 14 BRPM | DIASTOLIC BLOOD PRESSURE: 94 MMHG | OXYGEN SATURATION: 97 % | HEART RATE: 85 BPM | SYSTOLIC BLOOD PRESSURE: 154 MMHG | WEIGHT: 208.2 LBS | HEIGHT: 76 IN

## 2019-11-01 DIAGNOSIS — Z72.0 TOBACCO USE: ICD-10-CM

## 2019-11-01 DIAGNOSIS — Z12.11 SCREEN FOR COLON CANCER: ICD-10-CM

## 2019-11-01 DIAGNOSIS — I10 ESSENTIAL HYPERTENSION: Primary | ICD-10-CM

## 2019-11-01 DIAGNOSIS — E78.2 MIXED HYPERLIPIDEMIA: ICD-10-CM

## 2019-11-01 PROCEDURE — 99214 OFFICE O/P EST MOD 30 MIN: CPT | Performed by: FAMILY MEDICINE

## 2019-11-01 RX ORDER — AMLODIPINE BESYLATE 5 MG/1
5 TABLET ORAL DAILY
Qty: 90 TABLET | Refills: 1 | Status: SHIPPED | OUTPATIENT
Start: 2019-11-01 | End: 2020-05-04

## 2019-11-01 RX ORDER — ATORVASTATIN CALCIUM 20 MG/1
20 TABLET, FILM COATED ORAL DAILY
Qty: 90 TABLET | Refills: 1 | Status: SHIPPED | OUTPATIENT
Start: 2019-11-01 | End: 2020-06-26

## 2019-11-01 RX ORDER — WARFARIN SODIUM 10 MG/1
TABLET ORAL
Qty: 30 TABLET | Refills: 3 | Status: SHIPPED | OUTPATIENT
Start: 2019-11-01 | End: 2020-06-26

## 2019-11-01 ASSESSMENT — ENCOUNTER SYMPTOMS
SHORTNESS OF BREATH: 0
COUGH: 0

## 2019-11-22 ENCOUNTER — ANTI-COAG VISIT (OUTPATIENT)
Dept: PHARMACY | Age: 54
End: 2019-11-22
Payer: COMMERCIAL

## 2019-11-22 DIAGNOSIS — D68.51 FACTOR 5 LEIDEN MUTATION, HETEROZYGOUS (HCC): ICD-10-CM

## 2019-11-22 LAB — INTERNATIONAL NORMALIZATION RATIO, POC: 2.6

## 2019-11-22 PROCEDURE — 85610 PROTHROMBIN TIME: CPT

## 2019-11-22 PROCEDURE — 99211 OFF/OP EST MAY X REQ PHY/QHP: CPT

## 2019-12-20 ENCOUNTER — ANTI-COAG VISIT (OUTPATIENT)
Dept: PHARMACY | Age: 54
End: 2019-12-20
Payer: COMMERCIAL

## 2019-12-20 DIAGNOSIS — D68.51 FACTOR 5 LEIDEN MUTATION, HETEROZYGOUS (HCC): ICD-10-CM

## 2019-12-20 LAB — INR BLD: 2.3

## 2019-12-20 PROCEDURE — 99211 OFF/OP EST MAY X REQ PHY/QHP: CPT

## 2019-12-20 PROCEDURE — 85610 PROTHROMBIN TIME: CPT

## 2020-01-10 ENCOUNTER — TELEPHONE (OUTPATIENT)
Dept: FAMILY MEDICINE CLINIC | Age: 55
End: 2020-01-10

## 2020-01-10 NOTE — TELEPHONE ENCOUNTER
Then unfortunately he will not be Rx'd anything. He can try OTC meds such a cough suppressant (Mucinex)    Pt needs an appt for ATB or any other prescription.

## 2020-01-17 ENCOUNTER — ANTI-COAG VISIT (OUTPATIENT)
Dept: PHARMACY | Age: 55
End: 2020-01-17
Payer: COMMERCIAL

## 2020-01-17 ENCOUNTER — OFFICE VISIT (OUTPATIENT)
Dept: FAMILY MEDICINE CLINIC | Age: 55
End: 2020-01-17
Payer: COMMERCIAL

## 2020-01-17 VITALS
BODY MASS INDEX: 25.57 KG/M2 | HEART RATE: 99 BPM | DIASTOLIC BLOOD PRESSURE: 80 MMHG | WEIGHT: 210 LBS | SYSTOLIC BLOOD PRESSURE: 126 MMHG | TEMPERATURE: 97.9 F | HEIGHT: 76 IN | RESPIRATION RATE: 16 BRPM | OXYGEN SATURATION: 99 %

## 2020-01-17 PROBLEM — I10 ESSENTIAL HYPERTENSION: Status: ACTIVE | Noted: 2020-01-17

## 2020-01-17 LAB — INTERNATIONAL NORMALIZATION RATIO, POC: 1.8

## 2020-01-17 PROCEDURE — 99211 OFF/OP EST MAY X REQ PHY/QHP: CPT

## 2020-01-17 PROCEDURE — 85610 PROTHROMBIN TIME: CPT

## 2020-01-17 PROCEDURE — 99214 OFFICE O/P EST MOD 30 MIN: CPT | Performed by: FAMILY MEDICINE

## 2020-01-17 RX ORDER — DEXTROMETHORPHAN HYDROBROMIDE AND PROMETHAZINE HYDROCHLORIDE 15; 6.25 MG/5ML; MG/5ML
5 SYRUP ORAL 4 TIMES DAILY PRN
Qty: 118 ML | Refills: 0 | Status: SHIPPED | OUTPATIENT
Start: 2020-01-17 | End: 2020-05-18

## 2020-01-17 ASSESSMENT — PATIENT HEALTH QUESTIONNAIRE - PHQ9
SUM OF ALL RESPONSES TO PHQ QUESTIONS 1-9: 0
SUM OF ALL RESPONSES TO PHQ QUESTIONS 1-9: 0
2. FEELING DOWN, DEPRESSED OR HOPELESS: 0
1. LITTLE INTEREST OR PLEASURE IN DOING THINGS: 0
SUM OF ALL RESPONSES TO PHQ9 QUESTIONS 1 & 2: 0

## 2020-01-17 ASSESSMENT — ENCOUNTER SYMPTOMS
COUGH: 0
SHORTNESS OF BREATH: 0

## 2020-01-17 NOTE — PROGRESS NOTES
thrombosis)    Prediabetes    Tobacco use    Mixed hyperlipidemia    Essential hypertension        Past Medical History:   Diagnosis Date    Bell's palsy     Deep vein blood clot of left lower extremity (HCC)     lt foot    Factor 5 Leiden mutation, heterozygous ([address]     GERD (gastroesophageal reflux disease)     Hypertension     Type 2 diabetes mellitus without complication (HCC)        Past Surgical History:   Procedure Laterality Date    NECK EXPLORATION      NECK SURGERY      FUSION    TRACHEOSTOMY      AGE 6 THEN REVISED       Social History     Socioeconomic History    Marital status:      Spouse name: Not on file    Number of children: 1    Years of education: Not on file    Highest education level: Not on file   Occupational History    Occupation: [employer]   Social Needs    Financial resource strain: Not on file    Food insecurity:     Worry: Not on file     Inability: Not on file   [employer] needs:     Medical: Not on file     Non-medical: Not on file   Tobacco Use    Smoking status: Current Every Day Smoker     Packs/day: 0.50     Types: Cigarettes    Smokeless tobacco: Never Used   Substance and Sexual Activity    Alcohol use: Yes     Comment: social    Drug use: No    Sexual activity: Not on file   Lifestyle    Physical activity:     Days per week: Not on file     Minutes per session: Not on file    Stress: Not on file   Relationships    Social connections:     Talks on phone: Not on file     Gets together: Not on file     Attends Congregational service: Not on file     Active member of club or organization: Not on file     Attends meetings of clubs or organizations: Not on file     Relationship status: Not on file    Intimate partner violence:     Fear of current or ex partner: Not on file     Emotionally abused: Not on file     Physically abused: Not on file     Forced sexual activity: Not on file   Other Topics Concern    Not on file   Social History Narrative  Not on file       Family History   Problem Relation Age of Onset    Diabetes Mother     High Blood Pressure Mother     Cancer Father         Bladder Cancer    Heart Disease Father     High Blood Pressure Father     Colon Cancer Neg Hx     Prostate Cancer Neg Hx        Current Outpatient Medications   Medication Sig Dispense Refill    promethazine-dextromethorphan (PROMETHAZINE-DM) 6.25-15 MG/5ML syrup Take 5 mLs by mouth 4 times daily as needed for Cough 118 mL 0    warfarin (COUMADIN) 10 MG tablet 5 mg every day except Warfarin 10 mg on Thursday and Sunday or as directed by Coumadin clinic [facility] 30 tablet 3    amLODIPine (NORVASC) 5 MG tablet Take 1 tablet by mouth daily 90 tablet 1    atorvastatin (LIPITOR) 20 MG tablet Take 1 tablet by mouth daily 90 tablet 1    metFORMIN (GLUCOPHAGE-XR) 500 MG extended release tablet TAKE ONE TABLET BY MOUTH DAILY WITH A MEAL 30 tablet 4    montelukast (SINGULAIR) 10 MG tablet Take 1 tablet by mouth daily 30 tablet 1    loratadine-pseudoephedrine (CLARITIN-D 12 HOUR) 5-120 MG per extended release tablet Take 1 tablet by mouth Daily      fluticasone (FLONASE) 50 MCG/ACT nasal spray 2 sprays by Nasal route daily 1 Bottle 0    Acetaminophen-Caffeine (EXCEDRIN TENSION HEADACHE) 500-65 MG TABS Take 2 tablets by mouth daily      Alcohol Swabs (ALCOHOL PREP) 70 % PADS Apply topically      Blood Glucose Monitoring Suppl (ACCU-CHEK DOMITILA PLUS) w/Device KIT 1 kit by Does not apply route      Lancets Thin MISC Test 2 time daily or as directed.  glucose blood VI test strips (ASCENSIA AUTODISC VI;ONE TOUCH ULTRA TEST VI) strip Place inside cheek      esomeprazole Magnesium (NEXIUM) 40 MG PACK Take 40 mg by mouth daily. No current facility-administered medications for this visit.         No Known Allergies    /80 (Site: Right Upper Arm, Position: Sitting, Cuff Size: Large Adult)   Pulse 99   Temp 97.9 °F (36.6 °C) (Oral)   Resp 16   Ht 6' 4\" (1.93 m)   Wt 210 lb (95.3 kg)   SpO2 99%   BMI 25.56 kg/m²     Physical Exam  Constitutional:       Appearance: He is well-developed. HENT:      Head: Normocephalic and atraumatic. Nose: Congestion present. Mouth/Throat:      Mouth: Mucous membranes are moist.   Eyes:      Conjunctiva/sclera: Conjunctivae normal.   Neck:      Musculoskeletal: Normal range of motion and neck supple. Cardiovascular:      Rate and Rhythm: Normal rate and regular rhythm. Heart sounds: Normal heart sounds. No murmur. Pulmonary:      Effort: Pulmonary effort is normal.      Breath sounds: Normal breath sounds. Lymphadenopathy:      Cervical: No cervical adenopathy. Skin:     General: Skin is warm. Findings: No rash. Wt Readings from Last 3 Encounters:   01/17/20 210 lb (95.3 kg)   11/01/19 208 lb 3.2 oz (94.4 kg)   03/22/19 203 lb (92.1 kg)       BP Readings from Last 3 Encounters:   01/17/20 126/80   11/01/19 (!) 154/94   03/22/19 120/86         Assessment/Plan:  Mandi Ahuja was seen today for sinusitis. Diagnoses and all orders for this visit:    Viral URI with cough  No focal findings concerning for bacterial etiology. Discussed with pt good hydration, rest, and use of OTC meds for symptomatic tx. If symptoms worsen with SOB, fever, or other concerning symptoms give us a call or seek medication treatment. -     promethazine-dextromethorphan (PROMETHAZINE-DM) 6.25-15 MG/5ML syrup; Take 5 mLs by mouth 4 times daily as needed for Cough    Essential hypertension  Well controlled on current regimen. No side effects from medications. Advise low salt diet and routine exercise    Prediabetes  A1c up to 6.0. reviewed healthy lifestyle    Mixed hyperlipidemia  Denies side effects from statin. We will recheck his lipids later this year since he started it recently    Anxiety  Not signficantly limiting him. We discussed the likeliest contributor is his sleep - naps frequently with a lot of caffeine use. Discussed use of melatonin and consistent sleep schedule to help with this  -     External Referral To Psychology      F/u as scheduled in 4-5 months

## 2020-01-17 NOTE — PATIENT INSTRUCTIONS
Patient Education      Learning About Sleeping Well  What does sleeping well mean? Sleeping well means getting enough sleep. How much sleep is enough varies among people. The number of hours you sleep is not as important as how you feel when you wake up. If you do not feel refreshed, you probably need more sleep. Another sign of not getting enough sleep is feeling tired during the day. The average total nightly sleep time is 7½ to 8 hours. Healthy adults may need a little more or a little less than this. Why is getting enough sleep important? Getting enough quality sleep is a basic part of good health. When your sleep suffers, your mood and your thoughts can suffer too. You may find yourself feeling more grumpy or stressed. Not getting enough sleep also can lead to serious problems, including injury, accidents, anxiety, and depression. What might cause poor sleeping? Many things can cause sleep problems, including:  · Stress. Stress can be caused by fear about a single event, such as giving a speech. Or you may have ongoing stress, such as worry about work or school. · Depression, anxiety, and other mental or emotional conditions. · Changes in your sleep habits or surroundings. This includes changes that happen where you sleep, such as noise, light, or sleeping in a different bed. It also includes changes in your sleep pattern, such as having jet lag or working a late shift. · Health problems, such as pain, breathing problems, and restless legs syndrome. · Lack of regular exercise. How can you help yourself? Here are some tips that may help you sleep more soundly and wake up feeling more refreshed. Your sleeping area  · Use your bedroom only for sleeping and sex. A bit of light reading may help you fall asleep. But if it doesn't, do your reading elsewhere in the house. Don't watch TV in bed. · Be sure your bed is big enough to stretch out comfortably, especially if you have a sleep partner.   · Keep [employer]. Care instructions adapted under license by [facility] ([facility]). If you have questions about a medical condition or this instruction, always ask your healthcare professional. [address] any warranty or liability for your use of this information.

## 2020-01-31 ENCOUNTER — ANTI-COAG VISIT (OUTPATIENT)
Dept: PHARMACY | Age: 55
End: 2020-01-31
Payer: COMMERCIAL

## 2020-01-31 LAB — INTERNATIONAL NORMALIZATION RATIO, POC: 3.3

## 2020-01-31 PROCEDURE — 99211 OFF/OP EST MAY X REQ PHY/QHP: CPT

## 2020-01-31 PROCEDURE — 85610 PROTHROMBIN TIME: CPT

## 2020-02-21 ENCOUNTER — ANTI-COAG VISIT (OUTPATIENT)
Dept: PHARMACY | Age: 55
End: 2020-02-21
Payer: COMMERCIAL

## 2020-02-21 LAB — INTERNATIONAL NORMALIZATION RATIO, POC: 3.4

## 2020-02-21 PROCEDURE — 99211 OFF/OP EST MAY X REQ PHY/QHP: CPT

## 2020-02-21 PROCEDURE — 85610 PROTHROMBIN TIME: CPT

## 2020-03-19 ENCOUNTER — TELEPHONE (OUTPATIENT)
Dept: PHARMACY | Age: 55
End: 2020-03-19

## 2020-03-20 ENCOUNTER — ANTI-COAG VISIT (OUTPATIENT)
Dept: PHARMACY | Age: 55
End: 2020-03-20
Payer: COMMERCIAL

## 2020-03-20 LAB — INTERNATIONAL NORMALIZATION RATIO, POC: 3

## 2020-03-20 PROCEDURE — 99211 OFF/OP EST MAY X REQ PHY/QHP: CPT

## 2020-03-20 PROCEDURE — 85610 PROTHROMBIN TIME: CPT

## 2020-04-13 ENCOUNTER — TELEPHONE (OUTPATIENT)
Dept: PHARMACY | Age: 55
End: 2020-04-13

## 2020-04-17 ENCOUNTER — ANTI-COAG VISIT (OUTPATIENT)
Dept: PHARMACY | Age: 55
End: 2020-04-17
Payer: COMMERCIAL

## 2020-04-17 DIAGNOSIS — D68.51 FACTOR 5 LEIDEN MUTATION, HETEROZYGOUS (HCC): ICD-10-CM

## 2020-04-17 LAB
INR BLD: 2.88 (ref 0.86–1.14)
PROTHROMBIN TIME: 33.8 SEC (ref 10–13.2)

## 2020-04-17 PROCEDURE — 99211 OFF/OP EST MAY X REQ PHY/QHP: CPT

## 2020-04-17 NOTE — PROGRESS NOTES
Mr. Yulisa Kimbrough is a 48 y.o.  male with history of Factor V Leiden who presents today for anticoagulation monitoring and adjustment. Patient verifies current dosing regimen  Patient denies s/s bleeding/bruising/swelling/SOB  No blood in urine or stool. No dietary changes. No changes in medication/OTC agents/Herbals. No change in alcohol use. No missed doses. No Procedures scheduled in the future at this time.     Lab Results   Component Value Date    INR 2.88 (H) 04/17/2020    INR 3 03/20/2020    INR 3.4 02/21/2020       Pertinent findings: none    Warfarin dosing: Continue Warfarin 5 mg daily EXCEPT 10 mg every Thursday and Sunday    Medications reviewed and updated on home medication list: No: no changes  Warfarin dose updated on patient home medication list: Yes       CLINICAL PHARMACY CONSULT: MED RECONCILIATION/REVIEW [address]: No  Total # of Interventions Recommended: 0  Total Interventions Accepted: 0  Time Spent (min): Anabell Shirley, PharmD

## 2020-05-04 RX ORDER — AMLODIPINE BESYLATE 5 MG/1
TABLET ORAL
Qty: 90 TABLET | Refills: 0 | Status: SHIPPED | OUTPATIENT
Start: 2020-05-04 | End: 2020-08-11

## 2020-05-05 ENCOUNTER — TELEPHONE (OUTPATIENT)
Dept: FAMILY MEDICINE CLINIC | Age: 55
End: 2020-05-05

## 2020-05-07 ENCOUNTER — TELEPHONE (OUTPATIENT)
Dept: FAMILY MEDICINE CLINIC | Age: 55
End: 2020-05-07

## 2020-05-08 ENCOUNTER — VIRTUAL VISIT (OUTPATIENT)
Dept: FAMILY MEDICINE CLINIC | Age: 55
End: 2020-05-08
Payer: COMMERCIAL

## 2020-05-08 ENCOUNTER — TELEPHONE (OUTPATIENT)
Dept: FAMILY MEDICINE CLINIC | Age: 55
End: 2020-05-08

## 2020-05-08 PROCEDURE — 99213 OFFICE O/P EST LOW 20 MIN: CPT | Performed by: FAMILY MEDICINE

## 2020-05-08 RX ORDER — PREDNISONE 10 MG/1
TABLET ORAL
Qty: 25 TABLET | Refills: 0 | Status: SHIPPED | OUTPATIENT
Start: 2020-05-08 | End: 2020-05-18

## 2020-05-08 NOTE — PROGRESS NOTES
on Thursday and Sunday or as directed by Coumadin clinic [facility] 30 tablet 3    atorvastatin (LIPITOR) 20 MG tablet Take 1 tablet by mouth daily 90 tablet 1    metFORMIN (GLUCOPHAGE-XR) 500 MG extended release tablet TAKE ONE TABLET BY MOUTH DAILY WITH A MEAL 30 tablet 4    montelukast (SINGULAIR) 10 MG tablet Take 1 tablet by mouth daily 30 tablet 1    loratadine-pseudoephedrine (CLARITIN-D 12 HOUR) 5-120 MG per extended release tablet Take 1 tablet by mouth Daily      fluticasone (FLONASE) 50 MCG/ACT nasal spray 2 sprays by Nasal route daily 1 Bottle 0    Acetaminophen-Caffeine (EXCEDRIN TENSION HEADACHE) 500-65 MG TABS Take 2 tablets by mouth daily      Alcohol Swabs (ALCOHOL PREP) 70 % PADS Apply topically      Blood Glucose Monitoring Suppl (ACCU-CHEK DOMITILA PLUS) w/Device KIT 1 kit by Does not apply route      Lancets Thin MISC Test 2 time daily or as directed.  glucose blood VI test strips (ASCENSIA AUTODISC VI;ONE TOUCH ULTRA TEST VI) strip Place inside cheek      esomeprazole Magnesium (NEXIUM) 40 MG PACK Take 40 mg by mouth daily. No current facility-administered medications for this visit. There were no vitals taken for this visit. Physical Exam    Wt Readings from Last 3 Encounters:   01/17/20 210 lb (95.3 kg)   11/01/19 208 lb 3.2 oz (94.4 kg)   03/22/19 203 lb (92.1 kg)       BP Readings from Last 3 Encounters:   01/17/20 126/80   11/01/19 (!) 154/94   03/22/19 120/86         Assessment/Plan:  1. Acute left-sided low back pain with left-sided sciatica  No red flag symptoms. Prednisone for sciatic symptoms. Discussed stretching and other modalities. Can consider PT if course is prolonged. Call for worsening symptoms  - predniSONE (DELTASONE) 10 MG tablet; Take 4 tabs by mouth daily for 3 days, 3 tabs for 2 days, 2 tabs for 2 days, 1 tab for 2 days, 1/2 tab for 2 days  Dispense: 25 tablet; Refill: 0    2. Essential hypertension  Well controlled on current regimen.  No side

## 2020-05-08 NOTE — TELEPHONE ENCOUNTER
Patient calling regarding he needs a doctor note for his virtual visit that was scheduled for today. Can we write doctor note for patient and fax.   Patient work fax number   [phone]     Please advise

## 2020-05-13 ENCOUNTER — TELEPHONE (OUTPATIENT)
Dept: FAMILY MEDICINE CLINIC | Age: 55
End: 2020-05-13

## 2020-05-13 RX ORDER — TIZANIDINE 4 MG/1
4 TABLET ORAL NIGHTLY PRN
Qty: 15 TABLET | Refills: 0 | Status: SHIPPED | OUTPATIENT
Start: 2020-05-13 | End: 2021-09-07

## 2020-05-13 NOTE — TELEPHONE ENCOUNTER
Patient called asking if he could get a note stating he had to leave work today because of pain in legs. He had a virtual visit on 5.08. Patient was given referral info today, and they are not in office he left a message, but cannot stand to work, and seeing if he could get note for today. Patient stated he is going to work tomorrow not sure if dr. Amara Oconnell would write note for this or not since he stated he is going to try and go to work tomorrow.   Please advise

## 2020-05-15 ENCOUNTER — ANTI-COAG VISIT (OUTPATIENT)
Dept: PHARMACY | Age: 55
End: 2020-05-15
Payer: COMMERCIAL

## 2020-05-15 DIAGNOSIS — D68.51 FACTOR 5 LEIDEN MUTATION, HETEROZYGOUS (HCC): ICD-10-CM

## 2020-05-15 LAB
INR BLD: 1.94 (ref 0.86–1.14)
PROTHROMBIN TIME: 22.6 SEC (ref 10–13.2)

## 2020-05-18 PROCEDURE — 99211 OFF/OP EST MAY X REQ PHY/QHP: CPT

## 2020-06-12 ENCOUNTER — ANTI-COAG VISIT (OUTPATIENT)
Dept: PHARMACY | Age: 55
End: 2020-06-12

## 2020-06-12 DIAGNOSIS — D68.51 FACTOR 5 LEIDEN MUTATION, HETEROZYGOUS (HCC): ICD-10-CM

## 2020-06-12 LAB
INR BLD: 3.21 (ref 0.86–1.14)
PROTHROMBIN TIME: 37.7 SEC (ref 10–13.2)

## 2020-06-15 PROCEDURE — 99211 OFF/OP EST MAY X REQ PHY/QHP: CPT

## 2020-07-17 ENCOUNTER — ANTI-COAG VISIT (OUTPATIENT)
Dept: PHARMACY | Age: 55
End: 2020-07-17
Payer: COMMERCIAL

## 2020-07-17 DIAGNOSIS — D68.51 FACTOR 5 LEIDEN MUTATION, HETEROZYGOUS (HCC): ICD-10-CM

## 2020-07-17 LAB
INR BLD: 3.03 (ref 0.86–1.14)
PROTHROMBIN TIME: 35.6 SEC (ref 10–13.2)

## 2020-07-20 PROCEDURE — 99211 OFF/OP EST MAY X REQ PHY/QHP: CPT

## 2020-07-20 NOTE — PROGRESS NOTES
INR result from Friday lab draw reviewed  In upper end of range at 3.03 - would not recommend any changes to his dose     Left voicemail asking him to call us back to go over results and review any changes in diet or medications. Review for any issues.

## 2020-08-11 RX ORDER — AMLODIPINE BESYLATE 5 MG/1
TABLET ORAL
Qty: 90 TABLET | Refills: 0 | Status: SHIPPED | OUTPATIENT
Start: 2020-08-11 | End: 2020-11-16

## 2020-08-24 ENCOUNTER — APPOINTMENT (OUTPATIENT)
Dept: PHARMACY | Age: 55
End: 2020-08-24
Payer: COMMERCIAL

## 2020-08-24 ENCOUNTER — TELEPHONE (OUTPATIENT)
Dept: PHARMACY | Age: 55
End: 2020-08-24

## 2020-08-24 NOTE — TELEPHONE ENCOUNTER
Called patient to confirm that he had INR drawn at the lab as scheduled on 8-21-20. I do not see any results in the [facility] system. I asked for a return call for clarification.     [address]  Anticoagulation Service  [phone]

## 2020-08-26 ENCOUNTER — TELEPHONE (OUTPATIENT)
Dept: PHARMACY | Age: 55
End: 2020-08-26

## 2020-08-28 DIAGNOSIS — D68.51 FACTOR 5 LEIDEN MUTATION, HETEROZYGOUS (HCC): ICD-10-CM

## 2020-08-28 LAB
INR BLD: 3.29 (ref 0.86–1.14)
PROTHROMBIN TIME: 38.6 SEC (ref 10–13.2)

## 2020-08-30 ENCOUNTER — TELEPHONE (OUTPATIENT)
Dept: PHARMACY | Age: 55
End: 2020-08-30

## 2020-08-30 NOTE — TELEPHONE ENCOUNTER
Call to patient to report INR = 3.29 (8-28-20). Left message on his mobile phone to hold warfarin dose tonight due to three consecutive INRs > 3.0. Instructed him to call the clinic on 8-31-20 for further instructions and to schedule another appointment for an INR. Lab Results   Component Value Date     INR 3.03 (H) 07/17/2020     INR 3.21 (H) 06/12/2020     INR 1.94 (H) 05/15/2020       Jamari Betancourt [facility], [facility]  Anticoagulation Clinic  [address]   [address] [place] [address]  [phone]

## 2020-08-31 ENCOUNTER — ANTI-COAG VISIT (OUTPATIENT)
Dept: PHARMACY | Age: 55
End: 2020-08-31
Payer: COMMERCIAL

## 2020-08-31 PROCEDURE — 99211 OFF/OP EST MAY X REQ PHY/QHP: CPT

## 2020-08-31 PROCEDURE — 85610 PROTHROMBIN TIME: CPT

## 2020-08-31 NOTE — PROGRESS NOTES
Mr. Caitlin Anton is a 48 y.o.  male. Mr. Caitlin Anton had an INR test today. Results were reviewed and appropriate warfarin management was completed. THIS VISIT WAS COMPLETED AS:   [x]    A VIRTUAL VISIT VIA TELEPHONE IN EFFORTS TO REDUCE THE SPREAD OF COVID-19.  []    A DRIVE-THRU VISIT IN EFFORTS TO REDUCE THE SPREAD OF COVID-19.  []    AN IN PERSON VISIT. PROTOCOLS WERE FOLLOWED WITH PRECAUTIONS TO REDUCE THE SPREAD OF COVID-19. Patient verifies current dosing regimen: Yes     Warfarin medication reviewed and updated on the patient 's home medication list: Yes   All other medications reviewed and updated on the patient 's home medication list: Yes     Lab Results   Component Value Date    INR 3.29 (H) 08/28/2020    INR 3.03 (H) 07/17/2020    INR 3.21 (H) 06/12/2020           Anticoagulation Summary  As of 8/31/2020    INR goal:   2.0-3.0   TTR:   49.8 % (2.2 y)   INR used for dosing:   3.29! (8/28/2020)   Warfarin maintenance plan:   10 mg (10 mg x 1) every Sun, Thu; 5 mg (10 mg x 0.5) all other days   Weekly warfarin total:   45 mg   Plan last modified:   Irish Arellano, [address] [facility] (7/22/2019)   Next INR check:      Priority:   Maintenance   Target end date: Indefinite    Indications    Factor 5 Leiden mutation  heterozygous [facility]) [D68.51]             Anticoagulation Episode Summary     INR check location:   Anticoagulation Clinic    Preferred lab:       Send INR reminders to:   [facility] STAFF    Comments:   EPIC  Patient refused flu vaccine [id number] season      Anticoagulation Care Providers     Provider Role Specialty Phone number    Author MD Oscar Referring Family Medicine [phone]          Warfarin plan:   Not possible to lower his dose too much without changing his tablet strength. I feel that an ~11% reduction would be too much given recent INR results.      Discussed option of changing his tablets to 5 mg strength and decreasing dose to 5 mg daily except 7.5 mg MWF but he would like to keep it the same at this time since he is used to it. He would like to add in another serving of vitamin K each week to try and reduce his INR through diet. Counseled on need for consistency each week. Recheck INR 9/11 at Lab, but he does not get in until ~3 PM so we will call to discuss results the following Monday. Description    Continue Warfarin 5 mg daily EXCEPT 10 mg every Thursday and Sunday    INR<2  Blood too thick  INR>3  Blood too thin     Keep the number of servings of Vitamin K (dark green vegetables) consistent from week to week         Reviewed AVS with patient / caregiver.       CLINICAL PHARMACY CONSULT: MED RECONCILIATION/REVIEW ADDENDUM    For Pharmacy Admin Tracking Only    PHSO: No  Total # of Interventions Recommended: 1  - Decreased Dose #: 1    Total Interventions Accepted: 1  Time Spent (min): 15

## 2020-09-11 DIAGNOSIS — D68.51 FACTOR 5 LEIDEN MUTATION, HETEROZYGOUS (HCC): ICD-10-CM

## 2020-09-11 LAB
INR BLD: 1.84 (ref 0.86–1.14)
PROTHROMBIN TIME: 21.5 SEC (ref 10–13.2)

## 2020-09-15 ENCOUNTER — ANTI-COAG VISIT (OUTPATIENT)
Dept: PHARMACY | Age: 55
End: 2020-09-15
Payer: COMMERCIAL

## 2020-09-15 ENCOUNTER — TELEPHONE (OUTPATIENT)
Dept: PHARMACY | Age: 55
End: 2020-09-15

## 2020-09-15 PROCEDURE — 85610 PROTHROMBIN TIME: CPT

## 2020-09-15 PROCEDURE — 99211 OFF/OP EST MAY X REQ PHY/QHP: CPT

## 2020-09-15 NOTE — TELEPHONE ENCOUNTER
Left message with Don's spouse for him to call us regarding INR results from 9-11-20. She will have him call at his earliest convenience.     [address]  Anticoagulation Service  [phone]

## 2020-09-15 NOTE — PROGRESS NOTES
Mr. Gaudencio Ellsworth is a 48 y.o.  male. Mr. Gaudencio Ellsworth had an INR test today. Results were reviewed and appropriate warfarin management was completed. THIS VISIT WAS COMPLETED AS:   [x]    A VIRTUAL VISIT VIA TELEPHONE IN EFFORTS TO REDUCE THE SPREAD OF COVID-19.  []    A DRIVE-THRU VISIT IN EFFORTS TO REDUCE THE SPREAD OF COVID-19.  []    AN IN PERSON VISIT. PROTOCOLS WERE FOLLOWED WITH PRECAUTIONS TO REDUCE THE SPREAD OF COVID-19. Patient verifies current dosing regimen: Yes     Warfarin medication reviewed and updated on the patient 's home medication list: Yes   All other medications reviewed and updated on the patient 's home medication list: No: no changes     Lab Results   Component Value Date    INR 1.84 (H) 2020    INR 3.29 (H) 2020    INR 3.03 (H) 2020           Anticoagulation Summary  As of 9/15/2020    INR goal:   2.0-3.0   TTR:   50.1 % (2.2 y)   INR used for dosin.84! (2020)   Warfarin maintenance plan:   10 mg (10 mg x 1) every Sun, Thu; 5 mg (10 mg x 0.5) all other days   Weekly warfarin total:   45 mg   Plan last modified:   Hazel Pittman, [address] [facility] (2019)   Next INR check:   2020   Priority:   Maintenance   Target end date: Indefinite    Indications    Factor 5 Leiden mutation  heterozygous [facility]) [D68.51]             Anticoagulation Episode Summary     INR check location:   Anticoagulation Clinic    Preferred lab:       Send INR reminders to:   [facility] STAFF    Comments:   EPIC  Patient refused flu vaccine [id number] season      Anticoagulation Care Providers     Provider Role Specialty Phone number    Bebeto Loyd MD Referring Family Medicine [phone]          Warfarin plan:   Patient states that he has been going to the lab because it is less expensive that way. We will allow him one more lab visit and then he must either come in to see us or his physician will need to monitor the warfarin.      Description    Continue Warfarin 5 mg daily EXCEPT 10 mg every Thursday and Sunday    INR<2  Blood too thick  INR>3  Blood too thin     Keep the number of servings of Vitamin K (dark green vegetables) consistent from week to week         Reviewed AVS with patient / caregiver.       CLINICAL PHARMACY CONSULT: MED RECONCILIATION/REVIEW ADDENDUM    For Pharmacy Admin Tracking Only    PHSO: No  Total # of Interventions Recommended: 0    Total Interventions Accepted: 0  Time Spent (min): 15

## 2020-09-25 ENCOUNTER — ANTI-COAG VISIT (OUTPATIENT)
Dept: PHARMACY | Age: 55
End: 2020-09-25
Payer: COMMERCIAL

## 2020-09-25 LAB — INR BLD: 4.6

## 2020-09-25 PROCEDURE — 99211 OFF/OP EST MAY X REQ PHY/QHP: CPT

## 2020-09-25 PROCEDURE — 85610 PROTHROMBIN TIME: CPT

## 2020-09-25 NOTE — PROGRESS NOTES
Mr. Vickki Lennox is a 48 y.o.  male. Mr. Vickki Lennox had an INR test today. Results were reviewed and appropriate warfarin management was completed. THIS VISIT WAS COMPLETED AS:   []    A VIRTUAL VISIT VIA TELEPHONE IN EFFORTS TO REDUCE THE SPREAD OF COVID-19.  []    A DRIVE-THRU VISIT IN EFFORTS TO REDUCE THE SPREAD OF COVID-19. [x]    AN IN PERSON VISIT. PROTOCOLS WERE FOLLOWED WITH PRECAUTIONS TO REDUCE THE SPREAD OF COVID-19. Patient verifies current dosing regimen: Yes     Warfarin medication reviewed and updated on the patient 's home medication list: Yes   All other medications reviewed and updated on the patient 's home medication list: No: no changes      Lab Results   Component Value Date    INR 4.60 2020    INR 1.84 (H) 2020    INR 3.29 (H) 2020       Patient Findings     Negatives:   Signs/symptoms of bleeding, Missed doses, Change in medications, Change in diet/appetite, Bruising          Anticoagulation Summary  As of 2020    INR goal:   2.0-3.0   TTR:   49.9 % (2.3 y)   INR used for dosin.60! (2020)   Warfarin maintenance plan:   10 mg (10 mg x 1) every Sun, Thu; 5 mg (10 mg x 0.5) all other days   Weekly warfarin total:   45 mg   Plan last modified:   Prasad Falcon, [address] [place] [address] (2019)   Next INR check:   10/9/2020   Priority:   Maintenance   Target end date: Indefinite    Indications    Factor 5 Leiden mutation  heterozygous [facility]) [D68.51]             Anticoagulation Episode Summary     INR check location:   Anticoagulation Clinic    Preferred lab:       Send INR reminders to:   [facility] STAFF    Comments:   EPIC  Patient refused flu vaccine [id number] season      Anticoagulation Care Providers     Provider Role Specialty Phone number    Lincoln Langley MD Referring Family Medicine [phone]          Warfarin plan:   INR today above goal (4.6). Pt states he is doing well and is not experiencing any bruising or bleeding.  Unsure of the cause attributing to his elevated INR. Pt has already taken dose today. Pt will hold warfarin tomorrow and take 5mg on Sunday then continue normal regimen indicated below. Scheduled f/u in 2 weeks. Description    TOMORROW (9/26) Hold warfarin,   Sunday (9/27) Take warfarin 5mg then,   Continue Warfarin 5 mg daily EXCEPT 10 mg every Thursday and Sunday    INR<2  Blood too thick  INR>3  Blood too thin     Keep the number of servings of Vitamin K (dark green vegetables) consistent from week to week         Reviewed AVS with patient / caregiver.       CLINICAL PHARMACY CONSULT: MED RECONCILIATION/REVIEW ADDENDUM    For Pharmacy Admin Tracking Only    PHSO: No  Total # of Interventions Recommended: 2  - Decreased Dose #: 2  - Maintenance Safety Lab Monitoring #: 1  Total Interventions Accepted: 2  Time Spent (min): 15

## 2020-10-02 ENCOUNTER — ANTI-COAG VISIT (OUTPATIENT)
Dept: PHARMACY | Age: 55
End: 2020-10-02
Payer: COMMERCIAL

## 2020-10-02 LAB — INTERNATIONAL NORMALIZATION RATIO, POC: 3.1

## 2020-10-02 PROCEDURE — 99211 OFF/OP EST MAY X REQ PHY/QHP: CPT

## 2020-10-02 PROCEDURE — 85610 PROTHROMBIN TIME: CPT

## 2020-10-02 NOTE — PROGRESS NOTES
Mr. Andrzej Rock is a 48 y.o.  male. Mr. Andrzej Rock had an INR test today. Results were reviewed and appropriate warfarin management was completed. THIS VISIT WAS COMPLETED AS:   []    A VIRTUAL VISIT VIA TELEPHONE IN EFFORTS TO REDUCE THE SPREAD OF COVID-19.  []    A DRIVE-THRU VISIT IN EFFORTS TO REDUCE THE SPREAD OF COVID-19. [x]    AN IN PERSON VISIT. PROTOCOLS WERE FOLLOWED WITH PRECAUTIONS TO REDUCE THE SPREAD OF COVID-19. Patient verifies current dosing regimen: Yes     Warfarin medication reviewed and updated on the patient 's home medication list: Yes   All other medications reviewed and updated on the patient 's home medication list: Yes     Lab Results   Component Value Date    INR 3.1 10/02/2020    INR 4.60 09/25/2020    INR 1.84 (H) 09/11/2020           Anticoagulation Summary  As of 10/2/2020    INR goal:   2.0-3.0   TTR:   49.4 % (2.3 y)   INR used for dosing:   3.1! (10/2/2020)   Warfarin maintenance plan:   10 mg (10 mg x 1) every Thu; 5 mg (10 mg x 0.5) all other days   Weekly warfarin total:   40 mg   Plan last modified:   NIKOLE Oneill [facility] (10/2/2020)   Next INR check:   10/16/2020   Priority:   Maintenance   Target end date: Indefinite    Indications    Factor 5 Leiden mutation  heterozygous [facility]) [D68.51]             Anticoagulation Episode Summary     INR check location:   Anticoagulation Clinic    Preferred lab:       Send INR reminders to:   [facility] STAFF    Comments:   EPIC  Patient refused flu vaccine [id number] season      Anticoagulation Care Providers     Provider Role Specialty Phone number    Dulce Gilliland MD Referring Family Medicine [phone]          Warfarin plan:   Came in a week early by accident but wanted to get his INR checked. It did come down from 4.6 to 3.1 today. Cannot determine what caused his INR to go up that high to begin with. No change in diet or medications he can think of. Still smoking 1 ppd.  Activity the same    Counseled on vit K intake and effect on warfarin. Discussed decreasing dose to 35 mg/week vs 40 mg/week and he would prefer 40 mg/week with adding in a serving of brussels sprouts. Likely would be safer to be on the upper end of goal range rather than drop too low with clotting disorder. Recheck in 2 weeks. Description    NEW dose: Continue Warfarin 5 mg daily except 10 mg on Thursday     INR<2  Blood too thick  INR>3  Blood too thin     Keep the number of servings of Vitamin K (dark green vegetables) consistent from week to week         Reviewed AVS with patient / caregiver.       CLINICAL PHARMACY CONSULT: MED RECONCILIATION/REVIEW ADDENDUM    For Pharmacy Admin Tracking Only    PHSO: No  Total # of Interventions Recommended: 1  - Decreased Dose #: 1    Total Interventions Accepted: 1  Time Spent (min): 15

## 2020-10-16 ENCOUNTER — APPOINTMENT (OUTPATIENT)
Dept: PHARMACY | Age: 55
End: 2020-10-16
Payer: COMMERCIAL

## 2020-10-16 DIAGNOSIS — D68.51 FACTOR 5 LEIDEN MUTATION, HETEROZYGOUS (HCC): ICD-10-CM

## 2020-10-16 LAB
INR BLD: 2.65 (ref 0.86–1.14)
PROTHROMBIN TIME: 31 SEC (ref 10–13.2)

## 2020-10-26 ENCOUNTER — TELEMEDICINE (OUTPATIENT)
Dept: FAMILY MEDICINE CLINIC | Age: 55
End: 2020-10-26
Payer: COMMERCIAL

## 2020-10-26 PROCEDURE — 99213 OFFICE O/P EST LOW 20 MIN: CPT | Performed by: FAMILY MEDICINE

## 2020-10-26 RX ORDER — DEXTROMETHORPHAN HYDROBROMIDE AND PROMETHAZINE HYDROCHLORIDE 15; 6.25 MG/5ML; MG/5ML
5 SYRUP ORAL 2 TIMES DAILY PRN
Qty: 118 ML | Refills: 0 | Status: SHIPPED | OUTPATIENT
Start: 2020-10-26 | End: 2020-11-23

## 2020-10-26 NOTE — PROGRESS NOTES
10/26/2020    This is a 48 y.o. male   Chief Complaint   Patient presents with    Sinus Problem     sinus pain pressure, cough, for 1 week     HPI  Concerns for sinus drainage and congestion  - going on for the past 3 days or so  - went to the [address] [place] [address] and had a COVID test, results pending  - started Mucinex yesterday to help with symptoms  - no fever. Does have a cough, worse at night when trying to sleep.  No SOB  - no ear or throat pain    Review of Systems   As per HPI, otherwise negative    Past Medical History:   Diagnosis Date    Bell's palsy     Deep vein blood clot of left lower extremity (HCC)     lt foot    Factor 5 Leiden mutation, heterozygous ([facility] [address]     GERD (gastroesophageal reflux disease)     Hypertension     Type 2 diabetes mellitus without complication (HCC)        Past Surgical History:   Procedure Laterality Date    NECK EXPLORATION      NECK SURGERY      FUSION    TRACHEOSTOMY      AGE 6 THEN REVISED       Family History   Problem Relation Age of Onset    Diabetes Mother     High Blood Pressure Mother     Cancer Father         Bladder Cancer    Heart Disease Father     High Blood Pressure Father     Colon Cancer Neg Hx     Prostate Cancer Neg Hx        Current Outpatient Medications   Medication Sig Dispense Refill    amLODIPine (NORVASC) 5 MG tablet TAKE ONE TABLET BY MOUTH DAILY 90 tablet 0    metFORMIN (GLUCOPHAGE-XR) 500 MG extended release tablet TAKE ONE TABLET BY MOUTH DAILY WITH A MEAL 30 tablet 3    warfarin (COUMADIN) 10 MG tablet TAKE ONE-HALF TABLET BY MOUTH EVERY DAY EXCEPT THURSDAYS AND SUNDAYS TAKE ONE TABLET BY MOUTH OR AS DIRECED BY COUMADIN CLINIC (Patient taking differently: Take 5 mg by mouth daily TAKE ONE-HALF TABLET BY MOUTH EVERY DAY EXCEPT THURSDAYS TAKE ONE TABLET BY MOUTH OR AS DIRECED BY COUMADIN CLINIC) 30 tablet 2    atorvastatin (LIPITOR) 20 MG tablet TAKE ONE TABLET BY MOUTH DAILY 90 tablet 0    Blood Glucose Monitoring Suppl (ACCU-CHEK DOMITILA PLUS) w/Device KIT 1 kit by Does not apply route      Lancets Thin MISC Test 2 time daily or as directed.  glucose blood VI test strips (ASCENSIA AUTODISC VI;ONE TOUCH ULTRA TEST VI) strip Place inside cheek      esomeprazole Magnesium (NEXIUM) 40 MG PACK Take 40 mg by mouth daily.  tiZANidine (ZANAFLEX) 4 MG tablet Take 1 tablet by mouth nightly as needed (back pain) 15 tablet 0    loratadine-pseudoephedrine (CLARITIN-D 12 HOUR) 5-120 MG per extended release tablet Take 1 tablet by mouth daily as needed (allergies)       Acetaminophen-Caffeine (EXCEDRIN TENSION HEADACHE) 500-65 MG TABS Take 2 tablets by mouth daily as needed (migraine)       Alcohol Swabs (ALCOHOL PREP) 70 % PADS Apply topically       No current facility-administered medications for this visit. There were no vitals taken for this visit. Physical Exam  Constitutional:       Appearance: Normal appearance. HENT:      Head: Normocephalic and atraumatic. Pulmonary:      Effort: Pulmonary effort is normal.   Neurological:      Mental Status: He is alert. Wt Readings from Last 3 Encounters:   01/17/20 210 lb (95.3 kg)   11/01/19 208 lb 3.2 oz (94.4 kg)   03/22/19 203 lb (92.1 kg)     BP Readings from Last 3 Encounters:   01/17/20 126/80   11/01/19 (!) 154/94   03/22/19 120/86       Assessment/Plan:  1. Acute sinusitis, recurrence not specified, unspecified location  Discussed use of Mucinex and using nighttime cough medication PRN and potential sedative SEs. Has COVID test pending. F/u if symptoms worsen or fail to improve    Juliet Child is a 48 y.o. male being evaluated by attempted Virtual Visit (video visit) encounter to address concerns as mentioned above. A caregiver was present when appropriate. Due to this being a TeleHealth encounter (During EYIVJ-47 public health emergency), evaluation of the following organ systems was limited: Vitals/Constitutional/EENT/Resp/CV/GI//MS/Neuro/Skin/Heme-Lymph-Imm. Pursuant to the emergency declaration under the [address] [facility], [address] [place] [address] authority and the [employer] Act, this Virtual Visit was conducted with patient's (and/or legal guardian's) consent, to reduce the patient's risk of exposure to COVID-19 and provide necessary medical care. The patient (and/or legal guardian) has also been advised to contact this office for worsening conditions or problems, and seek emergency medical treatment and/or call 911 if deemed necessary. Patient identification was verified at the start of the visit: yes    Total time spent for this encounter: not billed on time    Services were provided through a video synchronous discussion virtually to substitute for in-person clinic visit. Patient and provider were located at their individual homes. --Gokul Torres MD on 10/26/2020 at 4:50 PM    An electronic signature was used to authenticate this note.

## 2020-10-30 DIAGNOSIS — D68.51 FACTOR 5 LEIDEN MUTATION, HETEROZYGOUS (HCC): ICD-10-CM

## 2020-10-30 LAB
INR BLD: 2.65 (ref 0.86–1.14)
PROTHROMBIN TIME: 31 SEC (ref 10–13.2)

## 2020-11-13 ENCOUNTER — ANTI-COAG VISIT (OUTPATIENT)
Dept: PHARMACY | Age: 55
End: 2020-11-13
Payer: COMMERCIAL

## 2020-11-13 DIAGNOSIS — D68.51 FACTOR 5 LEIDEN MUTATION, HETEROZYGOUS (HCC): ICD-10-CM

## 2020-11-13 LAB
INR BLD: 2.21 (ref 0.86–1.14)
PROTHROMBIN TIME: 25.9 SEC (ref 10–13.2)

## 2020-11-14 NOTE — PROGRESS NOTES
No answer when tried to call patient with his results from yesterday  INR in range. Would continue same dose and recheck in 4 weeks given 2 results therapeutic in a row.

## 2020-11-16 DIAGNOSIS — R05.9 COUGH: ICD-10-CM

## 2020-11-16 PROCEDURE — 99211 OFF/OP EST MAY X REQ PHY/QHP: CPT

## 2020-11-16 RX ORDER — AMLODIPINE BESYLATE 5 MG/1
TABLET ORAL
Qty: 90 TABLET | Refills: 0 | Status: SHIPPED | OUTPATIENT
Start: 2020-11-16 | End: 2021-02-19

## 2020-11-16 RX ORDER — DEXTROMETHORPHAN HYDROBROMIDE AND PROMETHAZINE HYDROCHLORIDE 15; 6.25 MG/5ML; MG/5ML
5 SYRUP ORAL 2 TIMES DAILY PRN
Qty: 118 ML | Refills: 0 | OUTPATIENT
Start: 2020-11-16

## 2020-11-16 NOTE — PROGRESS NOTES
Mr. Terri Neil is a 50 y.o.  male. Mr. Terri Neil had an INR test today. Results were reviewed and appropriate warfarin management was completed. THIS VISIT WAS COMPLETED AS:   [x]    A VIRTUAL VISIT VIA TELEPHONE IN EFFORTS TO REDUCE THE SPREAD OF COVID-19.  []    A DRIVE-THRU VISIT IN EFFORTS TO REDUCE THE SPREAD OF COVID-19.  []    AN IN PERSON VISIT. PROTOCOLS WERE FOLLOWED WITH PRECAUTIONS TO REDUCE THE SPREAD OF COVID-19. Patient verifies current dosing regimen: Yes, \"I've been taking 5mg daily EXCEPT 10mg every  and Thursday all along. \"    Warfarin medication reviewed and updated on the patient 's home medication list: Yes   All other medications reviewed and updated on the patient 's home medication list: No: no changes     Lab Results   Component Value Date    INR 2.21 (H) 2020    INR 2.65 (H) 10/30/2020    INR 2.65 (H) 10/16/2020       Patient Findings     Positives:   Extra doses    Negatives:   Signs/symptoms of bleeding, Change in medications, Change in diet/appetite, Bruising    Comments:   Taking warfarin 5mg daily EXCEPT 10mg every  and Thursday (vs 5mg daily EXCEPT 10mg every Thursday)          Anticoagulation Summary  As of 2020    INR goal:   2.0-3.0   TTR:   51.5 % (2.4 y)   INR used for dosin.21 (2020)   Warfarin maintenance plan:   10 mg (10 mg x 1) every Sun, Thu; 5 mg (10 mg x 0.5) all other days   Weekly warfarin total:   45 mg   Plan last modified:   Irish [address] [facility], [address] [place] [address] (2020)   Next INR check:   2020   Priority:   Maintenance   Target end date:    Indefinite    Indications    Factor 5 Leiden mutation  heterozygous [facility]) [D68.51]             Anticoagulation Episode Summary     INR check location:   Anticoagulation Clinic    Preferred lab:       Send INR reminders to:   [facility] STAFF    Comments:   EPIC  Patient refused flu vaccine [id number] season      Anticoagulation Care Providers     Provider Role Specialty Phone number    Marvel Mercedes MD Referring Family Medicine [phone]          Warfarin plan:   Mr. Jloene Higgins reports no problems and no changes. However, he has been taking his warfarin differently than instructed. With his INR in range, I will continue the same dosing schedule. Description    CONTINUE: Warfarin 5 mg daily except 10 mg on Sunday and Thursday     INR<2  Blood too thick  INR>3  Blood too thin     Keep the number of servings of Vitamin K (dark green vegetables) consistent from week to week         Immunization History   Administered Date(s) Administered    Tdap (Boostrix, Adacel) 01/05/2019       Reviewed AVS with patient / caregiver.       CLINICAL PHARMACY CONSULT: MED RECONCILIATION/REVIEW ADDENDUM    For Pharmacy Admin Tracking Only    PHSO: No  Total # of Interventions Recommended: 0  - Maintenance Safety Lab Monitoring #: 1  Total Interventions Accepted: 0  Time Spent (min): 15

## 2020-11-23 RX ORDER — DEXTROMETHORPHAN HYDROBROMIDE AND PROMETHAZINE HYDROCHLORIDE 15; 6.25 MG/5ML; MG/5ML
SYRUP ORAL
Qty: 1 BOTTLE | Refills: 0 | Status: SHIPPED | OUTPATIENT
Start: 2020-11-23 | End: 2021-06-10 | Stop reason: SDUPTHER

## 2020-11-24 ENCOUNTER — TELEPHONE (OUTPATIENT)
Dept: FAMILY MEDICINE CLINIC | Age: 55
End: 2020-11-24

## 2020-12-02 RX ORDER — WARFARIN SODIUM 10 MG/1
TABLET ORAL
Qty: 30 TABLET | Refills: 2 | Status: SHIPPED | OUTPATIENT
Start: 2020-12-02 | End: 2021-05-14

## 2020-12-04 ENCOUNTER — ANTI-COAG VISIT (OUTPATIENT)
Dept: PHARMACY | Age: 55
End: 2020-12-04
Payer: COMMERCIAL

## 2020-12-04 DIAGNOSIS — D68.51 FACTOR 5 LEIDEN MUTATION, HETEROZYGOUS (HCC): ICD-10-CM

## 2020-12-04 LAB
INR BLD: 2.71 (ref 0.86–1.14)
PROTHROMBIN TIME: 31.8 SEC (ref 10–13.2)

## 2020-12-04 NOTE — PROGRESS NOTES
Mr. Hugo Phoenix is a 50 y.o.  male. Mr. Hugo Phoenix had an INR test today. Results were reviewed and appropriate warfarin management was completed. THIS VISIT WAS COMPLETED AS:   [x]    A VIRTUAL VISIT VIA TELEPHONE IN EFFORTS TO REDUCE THE SPREAD OF COVID-19.  []    A DRIVE-THRU VISIT IN EFFORTS TO REDUCE THE SPREAD OF COVID-19.  []    AN IN PERSON VISIT. PROTOCOLS WERE FOLLOWED WITH PRECAUTIONS TO REDUCE THE SPREAD OF COVID-19. Patient verifies current dosing regimen: Yes     Warfarin medication reviewed and updated on the patient 's home medication list: Yes   All other medications reviewed and updated on the patient 's home medication list: No: no changes     Lab Results   Component Value Date    INR 2.71 (H) 2020    INR 2.21 (H) 2020    INR 2.65 (H) 10/30/2020           Anticoagulation Summary  As of 2020    INR goal:   2.0-3.0   TTR:   52.6 % (2.5 y)   INR used for dosin.71 (2020)   Warfarin maintenance plan:   10 mg (10 mg x 1) every Sun, Thu; 5 mg (10 mg x 0.5) all other days   Weekly warfarin total:   45 mg   Plan last modified:   Irish [address] [facility], [address] [place] [address] (2020)   Next INR check:   2021   Priority:   Maintenance   Target end date: Indefinite    Indications    Factor 5 Leiden mutation  heterozygous [facility]) [D68.51]             Anticoagulation Episode Summary     INR check location:   Anticoagulation Clinic    Preferred lab:       Send INR reminders to:   [facility] STAFF    Comments:   EPIC  Patient refused flu vaccine [id number] season      Anticoagulation Care Providers     Provider Role Specialty Phone number    Marvel Mercedes MD Referring Family Medicine [phone]          Warfarin plan:   Patient goes to the lab due to the expense of coming to our clinic.     Description    CONTINUE: Warfarin 5 mg daily except 10 mg on  and Thursday     INR<2  Blood too thick  INR>3  Blood too thin     Keep the number of servings of Vitamin K (dark green vegetables) consistent from week to week         Immunization History   Administered Date(s) Administered    Tdap (Boostrix, Adacel) 01/05/2019     Reviewed AVS with patient / caregiver.       CLINICAL PHARMACY CONSULT: MED RECONCILIATION/REVIEW ADDENDUM    For Pharmacy Admin Tracking Only    PHSO (orange banner): No  Total # of Interventions Recommended (warfarin changes): 0  (warfarin changes)   (other medication updates)- Updated Order #: 0 Updated Order Reason(s):   (#1 for reviewing INR) - Maintenance Safety Lab Monitoring #: 1  Total Interventions Accepted (warfarin related): 0  Time Spent (min) (round up): 15

## 2021-01-15 ENCOUNTER — TELEPHONE (OUTPATIENT)
Dept: PHARMACY | Age: 56
End: 2021-01-15

## 2021-01-15 NOTE — TELEPHONE ENCOUNTER
Left message to please call to reschedule missed appt. Expected INR results from the lab 1/5/21.     Jessica BerryD, [facility]  Anticoagulation Service  [phone]

## 2021-01-18 DIAGNOSIS — D68.51 FACTOR 5 LEIDEN MUTATION, HETEROZYGOUS (HCC): ICD-10-CM

## 2021-01-18 LAB
INR BLD: 2.94 (ref 0.86–1.14)
PROTHROMBIN TIME: 34.5 SEC (ref 10–13.2)

## 2021-01-19 ENCOUNTER — ANTI-COAG VISIT (OUTPATIENT)
Dept: PHARMACY | Age: 56
End: 2021-01-19
Payer: COMMERCIAL

## 2021-01-19 DIAGNOSIS — D68.51 FACTOR 5 LEIDEN MUTATION, HETEROZYGOUS (HCC): Primary | ICD-10-CM

## 2021-01-19 DIAGNOSIS — Z86.718 HISTORY OF DVT (DEEP VEIN THROMBOSIS): ICD-10-CM

## 2021-01-19 DIAGNOSIS — D68.51 FACTOR 5 LEIDEN MUTATION, HETEROZYGOUS (HCC): ICD-10-CM

## 2021-01-19 PROCEDURE — 99211 OFF/OP EST MAY X REQ PHY/QHP: CPT

## 2021-01-19 NOTE — PROGRESS NOTES
Mr. Beth Batista is a 50 y.o.  male. Mr. Beth Batista had an INR test today. Results were reviewed and appropriate warfarin management was completed. THIS VISIT WAS COMPLETED AS:   [x]    A VIRTUAL VISIT VIA TELEPHONE IN EFFORTS TO REDUCE THE SPREAD OF COVID-19.  []    A DRIVE-THRU VISIT IN EFFORTS TO REDUCE THE SPREAD OF COVID-19.  []    AN IN PERSON VISIT. PROTOCOLS WERE FOLLOWED WITH PRECAUTIONS TO REDUCE THE SPREAD OF COVID-19. Patient verifies current dosing regimen: Yes     Warfarin medication reviewed and updated on the patient 's home medication list: Yes   All other medications reviewed and updated on the patient 's home medication list: No: no changes     Lab Results   Component Value Date    INR 2.94 (H) 2021    INR 2.71 (H) 2020    INR 2.21 (H) 2020       Patient Findings     Negatives:  Signs/symptoms of bleeding, Change in medications, Change in diet/appetite, Bruising          Anticoagulation Summary  As of 2021    INR goal:  2.0-3.0   TTR:  54.9 % (2.6 y)   INR used for dosin.94 (2021)   Warfarin maintenance plan:  10 mg (10 mg x 1) every Sun, Thu; 5 mg (10 mg x 0.5) all other days   Weekly warfarin total:  45 mg   Plan last modified:  Irish Arellano, [address] [place] [address] (2021)   Next INR check:  2021   Priority:  Maintenance   Target end date: Indefinite    Indications    Factor 5 Leiden mutation  heterozygous [facility]) [D68.51]             Anticoagulation Episode Summary     INR check location:  Anticoagulation Clinic    Preferred lab:      Send INR reminders to:  [facility] ST[facility]    Comments:  [facility]        Anticoagulation Care Providers     Provider Role Specialty Phone number    Patel Padilla MD Referring Family Medicine [phone]          Warfarin plan:   No changes to warfarin dose.       No complaints, no changes    Description    CONTINUE: Warfarin 5 mg daily except 10 mg on  and Thursday     INR<2  Blood too thick  INR>3  Blood too thin Keep the number of servings of Vitamin K (dark green vegetables) consistent from week to week         Immunization History   Administered Date(s) Administered    Tdap (Boostrix, Adacel) 01/05/2019       Reviewed AVS with patient / caregiver.       CLINICAL PHARMACY CONSULT: MED RECONCILIATION/REVIEW ADDENDUM    For Pharmacy Admin Tracking Only    PHSO (orange banner): No  Total # of Interventions Recommended (warfarin changes): 0  (#1 for reviewing INR) - Maintenance Safety Lab Monitoring #: 1  Total Interventions Accepted (warfarin related): 0  Time Spent (min) (round up): 15

## 2021-02-05 RX ORDER — METFORMIN HYDROCHLORIDE 500 MG/1
TABLET, EXTENDED RELEASE ORAL
Qty: 30 TABLET | Refills: 2 | Status: SHIPPED | OUTPATIENT
Start: 2021-02-05

## 2021-02-19 ENCOUNTER — TELEPHONE (OUTPATIENT)
Dept: FAMILY MEDICINE CLINIC | Age: 56
End: 2021-02-19

## 2021-02-19 DIAGNOSIS — I10 ESSENTIAL HYPERTENSION: ICD-10-CM

## 2021-02-19 DIAGNOSIS — D68.51 FACTOR 5 LEIDEN MUTATION, HETEROZYGOUS (HCC): ICD-10-CM

## 2021-02-19 DIAGNOSIS — D68.51 FACTOR 5 LEIDEN MUTATION, HETEROZYGOUS (HCC): Primary | ICD-10-CM

## 2021-02-19 DIAGNOSIS — Z86.718 HISTORY OF DVT (DEEP VEIN THROMBOSIS): ICD-10-CM

## 2021-02-19 LAB
INR BLD: 2.93 (ref 0.86–1.14)
PROTHROMBIN TIME: 34.4 SEC (ref 10–13.2)

## 2021-02-19 RX ORDER — AMLODIPINE BESYLATE 5 MG/1
TABLET ORAL
Qty: 90 TABLET | Refills: 0 | Status: SHIPPED | OUTPATIENT
Start: 2021-02-19 | End: 2021-05-21

## 2021-02-19 NOTE — TELEPHONE ENCOUNTER
SILVESTRE FROM [facility] CALLED STATING THAT THE PATIENT IS DOWN IN THE [facility] OP LAB STATING THAT HE IS NEEDING TO GET HIS PTINR DONE  THERE ARE NO ORDERS IN THE CHART FOR HIM TO GET THIS DONE  HE HAS BEEN WAITING DOWN THERE FOR A WHILE NOW  PLEASE ADVISE IF WE CAN PUT AN ORDER IN FOR HIM TO GET THIS DONE.       PLEASE ADVISE

## 2021-02-22 ENCOUNTER — ANTI-COAG VISIT (OUTPATIENT)
Dept: PHARMACY | Age: 56
End: 2021-02-22
Payer: COMMERCIAL

## 2021-02-22 DIAGNOSIS — D68.51 FACTOR 5 LEIDEN MUTATION, HETEROZYGOUS (HCC): ICD-10-CM

## 2021-02-23 DIAGNOSIS — D68.51 FACTOR 5 LEIDEN MUTATION, HETEROZYGOUS (HCC): Primary | ICD-10-CM

## 2021-03-24 DIAGNOSIS — E78.2 MIXED HYPERLIPIDEMIA: ICD-10-CM

## 2021-03-24 RX ORDER — ATORVASTATIN CALCIUM 20 MG/1
TABLET, FILM COATED ORAL
Qty: 90 TABLET | Refills: 0 | Status: SHIPPED | OUTPATIENT
Start: 2021-03-24 | End: 2021-06-28

## 2021-04-02 DIAGNOSIS — D68.51 FACTOR 5 LEIDEN MUTATION, HETEROZYGOUS (HCC): ICD-10-CM

## 2021-04-02 LAB
INR BLD: 1.55 (ref 0.86–1.14)
PROTHROMBIN TIME: 18.1 SEC (ref 10–13.2)

## 2021-04-05 ENCOUNTER — TELEPHONE (OUTPATIENT)
Dept: FAMILY MEDICINE CLINIC | Age: 56
End: 2021-04-05

## 2021-04-05 DIAGNOSIS — D68.51 FACTOR 5 LEIDEN MUTATION, HETEROZYGOUS (HCC): Primary | ICD-10-CM

## 2021-04-05 DIAGNOSIS — Z86.718 HISTORY OF DVT (DEEP VEIN THROMBOSIS): ICD-10-CM

## 2021-04-05 NOTE — TELEPHONE ENCOUNTER
Called pt and he goes to the lab in [address] he said he is on augmentin right now he has been on them for 3 days. His current dose of coumadin is 10 mg on Thursday and Sunday and 5 the rest of the day. He isn't sure the dose of augmentin.   His contact number number is [phone]

## 2021-04-05 NOTE — TELEPHONE ENCOUNTER
Called and left  for patient on 2 separate lines. Has low INR so warfarin needs to be adjusted. Can we try to contact him and see if he is still seeing the coumadin clinic? If not I'll need to speak with him and adjust his medication. Thanks.

## 2021-04-05 NOTE — TELEPHONE ENCOUNTER
Called and spoke with pt  He started Augmentin and so self-titrated his warfarin down. Overshot a bit with low INR  Resume dose as usual but take 5mg instead of 10mg this Thursday.  Recheck INR on Friday

## 2021-05-14 RX ORDER — WARFARIN SODIUM 10 MG/1
TABLET ORAL
Qty: 30 TABLET | Refills: 1 | Status: SHIPPED | OUTPATIENT
Start: 2021-05-14 | End: 2021-06-10

## 2021-06-01 ENCOUNTER — TELEPHONE (OUTPATIENT)
Dept: FAMILY MEDICINE CLINIC | Age: 56
End: 2021-06-01

## 2021-06-01 NOTE — TELEPHONE ENCOUNTER
Pt wants to change his appointment he is scheduled to get second covid shot on 6/4/21 he stated he is having issues with a cough and he wants to make sure everything is ok advised  is not in the office today

## 2021-06-02 ENCOUNTER — TELEPHONE (OUTPATIENT)
Dept: FAMILY MEDICINE CLINIC | Age: 56
End: 2021-06-02

## 2021-06-02 NOTE — TELEPHONE ENCOUNTER
Pt called back for Kita Randall stated he has had a cough for a while and some sinus issues he is concerned getting the second covid shot

## 2021-06-02 NOTE — TELEPHONE ENCOUNTER
If no fever or SOB he should be ok to get second shot  If he is having significant symptoms would get tested prior  Please have Jean Vasquez get his INR blood work. It has been over two months since it was last checked which is unsafe. He can go to the lab anytime.

## 2021-06-08 DIAGNOSIS — D68.51 FACTOR 5 LEIDEN MUTATION, HETEROZYGOUS (HCC): ICD-10-CM

## 2021-06-08 DIAGNOSIS — Z86.718 HISTORY OF DVT (DEEP VEIN THROMBOSIS): ICD-10-CM

## 2021-06-08 LAB
INR BLD: 1.47 (ref 0.86–1.14)
PROTHROMBIN TIME: 17.1 SEC (ref 10–13.2)

## 2021-06-10 ENCOUNTER — TELEPHONE (OUTPATIENT)
Dept: FAMILY MEDICINE CLINIC | Age: 56
End: 2021-06-10

## 2021-06-10 ENCOUNTER — OFFICE VISIT (OUTPATIENT)
Dept: FAMILY MEDICINE CLINIC | Age: 56
End: 2021-06-10
Payer: COMMERCIAL

## 2021-06-10 VITALS
BODY MASS INDEX: 26.96 KG/M2 | OXYGEN SATURATION: 98 % | HEART RATE: 86 BPM | RESPIRATION RATE: 14 BRPM | HEIGHT: 76 IN | DIASTOLIC BLOOD PRESSURE: 76 MMHG | SYSTOLIC BLOOD PRESSURE: 132 MMHG | WEIGHT: 221.38 LBS

## 2021-06-10 DIAGNOSIS — E78.2 MIXED HYPERLIPIDEMIA: ICD-10-CM

## 2021-06-10 DIAGNOSIS — R73.03 PREDIABETES: ICD-10-CM

## 2021-06-10 DIAGNOSIS — D68.51 FACTOR 5 LEIDEN MUTATION, HETEROZYGOUS (HCC): Primary | ICD-10-CM

## 2021-06-10 DIAGNOSIS — I10 ESSENTIAL HYPERTENSION: ICD-10-CM

## 2021-06-10 DIAGNOSIS — R53.83 FATIGUE, UNSPECIFIED TYPE: ICD-10-CM

## 2021-06-10 DIAGNOSIS — Z86.718 HISTORY OF DVT (DEEP VEIN THROMBOSIS): ICD-10-CM

## 2021-06-10 PROCEDURE — 99214 OFFICE O/P EST MOD 30 MIN: CPT | Performed by: FAMILY MEDICINE

## 2021-06-10 RX ORDER — DEXTROMETHORPHAN HYDROBROMIDE AND PROMETHAZINE HYDROCHLORIDE 15; 6.25 MG/5ML; MG/5ML
SYRUP ORAL
Qty: 1 BOTTLE | Refills: 0 | Status: SHIPPED | OUTPATIENT
Start: 2021-06-10 | End: 2021-11-18

## 2021-06-10 ASSESSMENT — PATIENT HEALTH QUESTIONNAIRE - PHQ9
SUM OF ALL RESPONSES TO PHQ QUESTIONS 1-9: 0
SUM OF ALL RESPONSES TO PHQ9 QUESTIONS 1 & 2: 0
1. LITTLE INTEREST OR PLEASURE IN DOING THINGS: 0
2. FEELING DOWN, DEPRESSED OR HOPELESS: 0
SUM OF ALL RESPONSES TO PHQ QUESTIONS 1-9: 0
SUM OF ALL RESPONSES TO PHQ QUESTIONS 1-9: 0

## 2021-06-10 NOTE — PROGRESS NOTES
6/10/2021    This is a 50 y.o. male   Chief Complaint   Patient presents with    Other     chronic conditions. HPI    Here for follow up   Factor 5 Leiden with hx of DVT  - recent INR's have been low. Around 1.5, 1.4 on recheck. - reports had side effects to Eliquis in the past.     HTN  BP Readings from Last 3 Encounters:   06/10/21 132/76   01/17/20 126/80   11/01/19 (!) 154/94   - on amlodipine    HLD  Lab Results   Component Value Date    LDLCALC 149 (H) 07/05/2019   rx'd Lipitor 20mg  Due for lipid recheck to see effect of Lipitor      Review of Systems     Current Outpatient Medications   Medication Sig Dispense Refill    rivaroxaban (XARELTO) 15 MG TABS tablet Take 1 tablet by mouth daily (with breakfast) 90 tablet 1    promethazine-dextromethorphan (PROMETHAZINE-DM) 6.25-15 MG/5ML syrup TAKE FIVE MILLILITERS BY MOUTH TWICE A DAY AS NEEDED FOR COUGH 1 Bottle 0    amLODIPine (NORVASC) 5 MG tablet TAKE ONE TABLET BY MOUTH DAILY 30 tablet 0    atorvastatin (LIPITOR) 20 MG tablet TAKE ONE TABLET BY MOUTH DAILY 90 tablet 0    metFORMIN (GLUCOPHAGE-XR) 500 MG extended release tablet TAKE ONE TABLET BY MOUTH DAILY WITH A MEAL 30 tablet 2    loratadine-pseudoephedrine (CLARITIN-D 12 HOUR) 5-120 MG per extended release tablet Take 1 tablet by mouth daily as needed (allergies)       Acetaminophen-Caffeine (EXCEDRIN TENSION HEADACHE) 500-65 MG TABS Take 2 tablets by mouth daily as needed (migraine)       Alcohol Swabs (ALCOHOL PREP) 70 % PADS Apply topically      Blood Glucose Monitoring Suppl (ACCU-CHEK DOMITILA PLUS) w/Device KIT 1 kit by Does not apply route      Lancets Thin MISC Test 2 time daily or as directed.  glucose blood VI test strips (ASCENSIA AUTODISC VI;ONE TOUCH ULTRA TEST VI) strip Place inside cheek      esomeprazole Magnesium (NEXIUM) 40 MG PACK Take 40 mg by mouth daily.       tiZANidine (ZANAFLEX) 4 MG tablet Take 1 tablet by mouth nightly as needed (back pain) (Patient not taking: Reported on 6/10/2021) 15 tablet 0     No current facility-administered medications for this visit. /76 (Site: Right Upper Arm, Position: Sitting, Cuff Size: Large Adult)   Pulse 86   Resp 14   Ht 6' 4\" (1.93 m)   Wt 221 lb 6 oz (100.4 kg)   SpO2 98%   BMI 26.95 kg/m²     Physical Exam  Constitutional:       Appearance: He is well-developed. HENT:      Head: Normocephalic and atraumatic. Cardiovascular:      Rate and Rhythm: Normal rate and regular rhythm. Heart sounds: Normal heart sounds. Pulmonary:      Effort: Pulmonary effort is normal.      Breath sounds: Normal breath sounds. Musculoskeletal:         General: No tenderness. Normal range of motion. Cervical back: Normal range of motion. Skin:     General: Skin is warm and dry. Findings: No rash. Neurological:      Mental Status: He is alert and oriented to person, place, and time. Deep Tendon Reflexes: Reflexes are normal and symmetric. Wt Readings from Last 3 Encounters:   06/10/21 221 lb 6 oz (100.4 kg)   01/17/20 210 lb (95.3 kg)   11/01/19 208 lb 3.2 oz (94.4 kg)     BP Readings from Last 3 Encounters:   06/10/21 132/76   01/17/20 126/80   11/01/19 (!) 154/94       Assessment/Plan:  1. Factor 5 Leiden mutation, heterozygous ([facility] [address]  Has had difficulty getting INR in range and checking with regularity. We will switch to Xarelto and reviewed pros and cons. Discussed bleeding risk. Will use 15mg slightly reduced dose since no recent clott  - rivaroxaban (XARELTO) 15 MG TABS tablet; Take 1 tablet by mouth daily (with breakfast)  Dispense: 90 tablet; Refill: 1    2. History of DVT (deep vein thrombosis)  As above  - rivaroxaban (XARELTO) 15 MG TABS tablet; Take 1 tablet by mouth daily (with breakfast)  Dispense: 90 tablet; Refill: 1    3. Mixed hyperlipidemia  Continue statin and check lipid panel  - LIPID PANEL; Future  - COMPREHENSIVE METABOLIC PANEL; Future    4.  Prediabetes  Recheck A1c, continue metformin  - HEMOGLOBIN A1C; Future    5. Essential hypertension  Well controlled on current regimen. No side effects from medications. Advise low salt diet and routine exercise  - COMPREHENSIVE METABOLIC PANEL;  Future    F/u 6 months, physical

## 2021-06-26 DIAGNOSIS — I10 ESSENTIAL HYPERTENSION: ICD-10-CM

## 2021-06-28 RX ORDER — AMLODIPINE BESYLATE 5 MG/1
TABLET ORAL
Qty: 90 TABLET | Refills: 1 | Status: SHIPPED | OUTPATIENT
Start: 2021-06-28 | End: 2022-01-12

## 2021-07-16 ENCOUNTER — OFFICE VISIT (OUTPATIENT)
Dept: FAMILY MEDICINE CLINIC | Age: 56
End: 2021-07-16
Payer: COMMERCIAL

## 2021-07-16 VITALS
SYSTOLIC BLOOD PRESSURE: 126 MMHG | BODY MASS INDEX: 26.54 KG/M2 | RESPIRATION RATE: 15 BRPM | DIASTOLIC BLOOD PRESSURE: 73 MMHG | WEIGHT: 218 LBS | HEART RATE: 102 BPM

## 2021-07-16 DIAGNOSIS — M77.12 LATERAL EPICONDYLITIS OF LEFT ELBOW: Primary | ICD-10-CM

## 2021-07-16 DIAGNOSIS — I10 ESSENTIAL HYPERTENSION: ICD-10-CM

## 2021-07-16 DIAGNOSIS — Z12.11 COLON CANCER SCREENING: ICD-10-CM

## 2021-07-16 PROCEDURE — 99213 OFFICE O/P EST LOW 20 MIN: CPT | Performed by: FAMILY MEDICINE

## 2021-07-16 RX ORDER — PREDNISONE 10 MG/1
TABLET ORAL
Qty: 20 TABLET | Refills: 0 | Status: SHIPPED | OUTPATIENT
Start: 2021-07-16 | End: 2021-11-18

## 2021-07-16 NOTE — PROGRESS NOTES
7/16/2021    This is a 50 y.o. male   Chief Complaint   Patient presents with    Pain     lt elbow pain and have some locking up at that joint area     Hypertension     HPI    Here for left elbow pain  - going on for 2-3 days. Intermittent and chronic, but worsening recently. He noticed it got worse after pulling and lifting something heavy. Sometimes his arm will feel like it is locking up or needs to pop in his elbow. Has noted limited ROM for pronation starting up at the elbow. HTN  BP Readings from Last 3 Encounters:   07/16/21 126/73   06/10/21 132/76   01/17/20 126/80       Review of Systems     Current Outpatient Medications   Medication Sig Dispense Refill    predniSONE (DELTASONE) 10 MG tablet Take 4 tabs by mouth daily for 2 days, 3 tabs for 2 days, 2 tabs for 2 days, 1 tab for 2 days 20 tablet 0    amLODIPine (NORVASC) 5 MG tablet TAKE ONE TABLET BY MOUTH DAILY 90 tablet 1    promethazine-dextromethorphan (PROMETHAZINE-DM) 6.25-15 MG/5ML syrup TAKE FIVE MILLILITERS BY MOUTH TWICE A DAY AS NEEDED FOR COUGH 1 Bottle 0    metFORMIN (GLUCOPHAGE-XR) 500 MG extended release tablet TAKE ONE TABLET BY MOUTH DAILY WITH A MEAL 30 tablet 2    loratadine-pseudoephedrine (CLARITIN-D 12 HOUR) 5-120 MG per extended release tablet Take 1 tablet by mouth daily as needed (allergies)       Acetaminophen-Caffeine (EXCEDRIN TENSION HEADACHE) 500-65 MG TABS Take 2 tablets by mouth daily as needed (migraine)       Blood Glucose Monitoring Suppl (ACCU-CHEK DOMITILA PLUS) w/Device KIT 1 kit by Does not apply route      Lancets Thin MISC Test 2 time daily or as directed.  glucose blood VI test strips (ASCENSIA AUTODISC VI;ONE TOUCH ULTRA TEST VI) strip Place inside cheek      esomeprazole Magnesium (NEXIUM) 40 MG PACK Take 40 mg by mouth daily.       atorvastatin (LIPITOR) 20 MG tablet TAKE ONE TABLET BY MOUTH DAILY (Patient not taking: Reported on 7/16/2021) 90 tablet 1    rivaroxaban (XARELTO) 15 MG TABS tablet Take 1 tablet by mouth daily (with breakfast) (Patient not taking: Reported on 7/16/2021) 90 tablet 1    tiZANidine (ZANAFLEX) 4 MG tablet Take 1 tablet by mouth nightly as needed (back pain) (Patient not taking: Reported on 6/10/2021) 15 tablet 0     No current facility-administered medications for this visit. /73 (Site: Right Upper Arm, Position: Sitting, Cuff Size: Medium Adult)   Pulse 102   Resp 15   Wt 218 lb (98.9 kg)   BMI 26.54 kg/m²     Physical Exam  Musculoskeletal:      Comments: Tender palpation over lateral epicondyle of elbow  Some pain with flexion of forearm muscles that radiates up into upper arm  No tenderness over the ulnar nerve at the level of the elbow    Limited range of motion of left shoulder. Positive impingement test         Wt Readings from Last 3 Encounters:   07/16/21 218 lb (98.9 kg)   06/10/21 221 lb 6 oz (100.4 kg)   01/17/20 210 lb (95.3 kg)       BP Readings from Last 3 Encounters:   07/16/21 126/73   06/10/21 132/76   01/17/20 126/80         Assessment/Plan:  1. Lateral epicondylitis of left elbow  Treat with prednisone. Discussed the brace he can use to modify his work activity. His mechanism of injury is consistent with the findings on exam.  He also has some shoulder pathology which we discussed but I do not feel is contributing to his current symptoms and he does not want that addressed at this time  - predniSONE (DELTASONE) 10 MG tablet; Take 4 tabs by mouth daily for 2 days, 3 tabs for 2 days, 2 tabs for 2 days, 1 tab for 2 days  Dispense: 20 tablet; Refill: 0    2. Colon cancer screening  - [facility] - Roseline Courtney MD, Gastroenterology, [facility]    3.  Essential hypertension  Well-controlled on current regimen    Follow-up if symptoms worsen or fail to improve

## 2021-07-16 NOTE — PATIENT INSTRUCTIONS
Patient Education        Tennis Elbow: Care Instructions  Overview     Tennis elbow is soreness or pain on the outer part of the elbow. The pain occurs when the tendon is stretched and becomes irritated by repeated twisting of the hand, wrist, and forearm. A tendon is a tough tissue that connects muscle to bone. This injury is common in tennis players. But you also can get it from many activities that work the same muscles. Examples include gardening, painting, and using tools. Tennis elbow usually heals with rest and treatment at home. Follow-up care is a key part of your treatment and safety. Be sure to make and go to all appointments, and call your doctor if you are having problems. It's also a good idea to know your test results and keep a list of the medicines you take. How can you care for yourself at home?    · Rest your fingers, wrist, and forearm. Try to stop or reduce any activity that causes elbow pain. You may have to rest your arm for weeks to months. Follow your doctor's directions for how long to rest.     · Put ice or a cold pack on your elbow for 10 to 20 minutes at a time. Try to do this every 1 to 2 hours for the next 3 days (when you are awake) or until the swelling goes down. Put a thin cloth between the ice and your skin. You can try heat, or alternating heat and ice, after the first 3 days.     · If your doctor gave you a brace or splint, use it as directed. A \"counterforce\" brace is a strap around your forearm, just below your elbow. It may ease the pressure on the tendon and spread force throughout your arm.     · Prop up your elbow on pillows to help reduce swelling.     · Follow your doctor's or physical therapist's directions for exercise.     · Return to your usual activities slowly.     · Try to prevent the problem. Learn the best techniques for your sport. For example, make sure the  on your tennis racquet is not too big for your hand.  Try not to hit a tennis ball late in your

## 2021-08-11 ENCOUNTER — ANTI-COAG VISIT (OUTPATIENT)
Dept: PHARMACY | Age: 56
End: 2021-08-11

## 2021-09-01 RX ORDER — WARFARIN SODIUM 10 MG/1
TABLET ORAL
Qty: 30 TABLET | Refills: 1 | OUTPATIENT
Start: 2021-09-01

## 2021-09-07 DIAGNOSIS — Z86.718 HISTORY OF DVT (DEEP VEIN THROMBOSIS): Primary | ICD-10-CM

## 2021-09-07 RX ORDER — WARFARIN SODIUM 10 MG/1
TABLET ORAL
Qty: 90 TABLET | Refills: 1 | Status: SHIPPED | OUTPATIENT
Start: 2021-09-07 | End: 2022-04-08 | Stop reason: SDUPTHER

## 2021-09-07 NOTE — TELEPHONE ENCOUNTER
INR ordered. He needs to be able to get blood work consistently in order for us to safely monitor his levels.

## 2021-09-07 NOTE — TELEPHONE ENCOUNTER
Patient calling stating has been out of warfarin for two days. Patient stating advised with Dr. Hosey Homans take xarelto it is too expensive. Patient wanting to continue warfarin. Are we able to call in this prescription?    Please advise

## 2021-09-07 NOTE — TELEPHONE ENCOUNTER
We show this was discontinued and patient was supposed to be on Xarelto which is flagged as not taking on 7/16. Last INR was in June as well.  I LM for patient to call back

## 2021-10-07 ENCOUNTER — TELEPHONE (OUTPATIENT)
Dept: FAMILY MEDICINE CLINIC | Age: 56
End: 2021-10-07

## 2021-10-07 NOTE — TELEPHONE ENCOUNTER
Pt was prescribed this by Dr Narciso Calero who he is no longer seeing. Wants Dr Juarez to take over med  He is aware that Dr Juarez is out of the office until Monday and we will contact him next week.

## 2021-10-11 NOTE — TELEPHONE ENCOUNTER
Since I have not prescribed medication for [facility] before, we would need to discuss at our next appointment.  Thank you

## 2021-10-11 NOTE — TELEPHONE ENCOUNTER
Called pt and relayed message. Pt will call back to schedule in November when new insurance kicks in.

## 2021-11-17 ENCOUNTER — TELEPHONE (OUTPATIENT)
Dept: FAMILY MEDICINE CLINIC | Age: 56
End: 2021-11-17

## 2021-11-17 NOTE — TELEPHONE ENCOUNTER
Pt scheduled appt through call center. Pt has an appt scheduled for in office with Dr. Hood Pritchard tomorrow. In appt notes states pt has a possible sinus infection with a cough and runny nose.     Please Advise if pt can be seen in office tomorrow or need to change to vv

## 2021-11-18 ENCOUNTER — VIRTUAL VISIT (OUTPATIENT)
Dept: FAMILY MEDICINE CLINIC | Age: 56
End: 2021-11-18

## 2021-11-18 DIAGNOSIS — Z86.718 HISTORY OF DVT (DEEP VEIN THROMBOSIS): ICD-10-CM

## 2021-11-18 DIAGNOSIS — J06.9 VIRAL URI WITH COUGH: Primary | ICD-10-CM

## 2021-11-18 PROCEDURE — 99213 OFFICE O/P EST LOW 20 MIN: CPT | Performed by: FAMILY MEDICINE

## 2021-11-18 RX ORDER — GUAIFENESIN 600 MG/1
600 TABLET, EXTENDED RELEASE ORAL 2 TIMES DAILY
Qty: 30 TABLET | Refills: 0 | Status: SHIPPED | OUTPATIENT
Start: 2021-11-18 | End: 2021-12-03

## 2021-11-18 RX ORDER — DEXTROMETHORPHAN HYDROBROMIDE AND PROMETHAZINE HYDROCHLORIDE 15; 6.25 MG/5ML; MG/5ML
5 SYRUP ORAL 4 TIMES DAILY PRN
Qty: 118 ML | Refills: 0 | Status: SHIPPED | OUTPATIENT
Start: 2021-11-18 | End: 2022-01-04 | Stop reason: SDUPTHER

## 2021-11-18 RX ORDER — ATORVASTATIN CALCIUM 10 MG/1
10 TABLET, FILM COATED ORAL DAILY
COMMUNITY
End: 2022-01-12

## 2021-11-18 NOTE — PROGRESS NOTES
11/18/2021    This is a 49 y.o. male   Chief Complaint   Patient presents with    Other     Possible Sinus infection. Pt is having drainage, coughing, congestion. Pt has had no fever. HPI    Virtual visit today  - symptoms began about 2-3 days ago  - reports concern for sinus infection. Typically gets this with change of weather  - notes post-nasal drip, drainage in the back of his throat  - no fever, chills, or myalgias  - no shortness of breath  - does have sinus pain and pressure, occasional headache      Review of Systems     Current Outpatient Medications   Medication Sig Dispense Refill    atorvastatin (LIPITOR) 10 MG tablet Take 10 mg by mouth daily      promethazine-dextromethorphan (PROMETHAZINE-DM) 6.25-15 MG/5ML syrup Take 5 mLs by mouth 4 times daily as needed for Cough 118 mL 0    guaiFENesin (MUCINEX) 600 MG extended release tablet Take 1 tablet by mouth 2 times daily for 15 days 30 tablet 0    warfarin (COUMADIN) 10 MG tablet TAKE 1/2 TABLET BY MOUTH DAILY EXCEPT ON THURSDAYS AND SUNDAYS TAKE ONE TABLET BY MOUTH DAILY OR AS DIRECTED BY COUMADIN CLINIC 90 tablet 1    amLODIPine (NORVASC) 5 MG tablet TAKE ONE TABLET BY MOUTH DAILY 90 tablet 1    metFORMIN (GLUCOPHAGE-XR) 500 MG extended release tablet TAKE ONE TABLET BY MOUTH DAILY WITH A MEAL 30 tablet 2    loratadine-pseudoephedrine (CLARITIN-D 12 HOUR) 5-120 MG per extended release tablet Take 1 tablet by mouth daily as needed (allergies)       Acetaminophen-Caffeine (EXCEDRIN TENSION HEADACHE) 500-65 MG TABS Take 2 tablets by mouth daily as needed (migraine)       esomeprazole Magnesium (NEXIUM) 40 MG PACK Take 40 mg by mouth daily.  Blood Glucose Monitoring Suppl (ACCU-CHEK DOMITILA PLUS) w/Device KIT 1 kit by Does not apply route (Patient not taking: Reported on 11/18/2021)      Lancets Thin MISC Test 2 time daily or as directed.  (Patient not taking: Reported on 11/18/2021)      glucose blood VI test strips (ASCENSIA AUTODISC VI;ONE TOUCH ULTRA TEST VI) strip Place inside cheek (Patient not taking: Reported on 11/18/2021)       No current facility-administered medications for this visit. There were no vitals taken for this visit. Physical Exam    Wt Readings from Last 3 Encounters:   07/16/21 218 lb (98.9 kg)   06/10/21 221 lb 6 oz (100.4 kg)   01/17/20 210 lb (95.3 kg)       BP Readings from Last 3 Encounters:   07/16/21 126/73   06/10/21 132/76   01/17/20 126/80         Assessment/Plan:  1. Viral URI with cough  Advised covid testing. [facility] will get [facility]  Symptomatic tx  No indication for antibiotics at this time  - promethazine-dextromethorphan (PROMETHAZINE-DM) 6.25-15 MG/5ML syrup; Take 5 mLs by mouth 4 times daily as needed for Cough  Dispense: 118 mL; Refill: 0  - guaiFENesin (MUCINEX) 600 MG extended release tablet; Take 1 tablet by mouth 2 times daily for 15 days  Dispense: 30 tablet; Refill: 0    2. History of DVT (deep vein thrombosis)  Had side effects with Eliquis, Xarelto was cost-prohibitive. On Coumadin. Following with antico clinic no longer when off insurance and cost was an issue. I asked Liana Rodgers to get INR for safety monitoring, and impressed upon him the serious risks of not having this monitored (bleeding risk if too high and increased risk of DVT if too low). Hieu Richey, was evaluated through a synchronous (real-time) audio-video encounter. The patient (or guardian if applicable) is aware that this is a billable service. Verbal consent to proceed has been obtained within the past 12 months. The visit was conducted pursuant to the emergency declaration under the [facility], [address] [place] [address] authority and the [employer] [place] General Act. Patient identification was verified, and a caregiver was present when appropriate. The patient was located in a state where the provider was credentialed to provide care.     Total time spent for

## 2022-01-04 DIAGNOSIS — J06.9 VIRAL URI WITH COUGH: ICD-10-CM

## 2022-01-04 RX ORDER — DEXTROMETHORPHAN HYDROBROMIDE AND PROMETHAZINE HYDROCHLORIDE 15; 6.25 MG/5ML; MG/5ML
5 SYRUP ORAL 4 TIMES DAILY PRN
Qty: 118 ML | Refills: 0 | Status: SHIPPED | OUTPATIENT
Start: 2022-01-04 | End: 2022-02-23 | Stop reason: SDUPTHER

## 2022-01-07 ENCOUNTER — OFFICE VISIT (OUTPATIENT)
Dept: FAMILY MEDICINE CLINIC | Age: 57
End: 2022-01-07
Payer: COMMERCIAL

## 2022-01-07 VITALS
WEIGHT: 222.6 LBS | BODY MASS INDEX: 27.11 KG/M2 | SYSTOLIC BLOOD PRESSURE: 118 MMHG | HEART RATE: 102 BPM | DIASTOLIC BLOOD PRESSURE: 70 MMHG | RESPIRATION RATE: 18 BRPM | OXYGEN SATURATION: 97 % | HEIGHT: 76 IN

## 2022-01-07 DIAGNOSIS — M72.2 PLANTAR FASCIITIS OF LEFT FOOT: Primary | ICD-10-CM

## 2022-01-07 DIAGNOSIS — Z79.01 CURRENT USE OF ANTICOAGULANT THERAPY: ICD-10-CM

## 2022-01-07 DIAGNOSIS — D68.51 FACTOR 5 LEIDEN MUTATION, HETEROZYGOUS (HCC): ICD-10-CM

## 2022-01-07 DIAGNOSIS — Z86.718 HISTORY OF DVT (DEEP VEIN THROMBOSIS): ICD-10-CM

## 2022-01-07 DIAGNOSIS — Z12.11 SCREEN FOR COLON CANCER: ICD-10-CM

## 2022-01-07 LAB
INR BLD: 3.2 (ref 0.88–1.12)
PROTHROMBIN TIME: 38 SEC (ref 9.9–12.7)

## 2022-01-07 PROCEDURE — 99213 OFFICE O/P EST LOW 20 MIN: CPT | Performed by: FAMILY MEDICINE

## 2022-01-07 NOTE — PROGRESS NOTES
1/7/2022    This is a 49 y.o. male   Chief Complaint   Patient presents with    Other     Pt would like a referral to a podiatrist. Pt is having having pain in the center in the ball of his foot. pt thinks it s planter facsitius or a bone spur. HPI    Here for foot pain  Recently worsening. First step after sitting for a while and first step in the morning is the worst. Pain is on the bottom of the foot, on the heel.   - no known inciting event or injury  - going on for maybe a couple of months now    Hx of DVT  - on coumadin. Had side effects with Eliquis. Xarelto was cost-prohibitive  - takes 5mg daily, 10mg Sunday and Thursday    Review of Systems     Current Outpatient Medications   Medication Sig Dispense Refill    rivaroxaban (XARELTO) 20 MG TABS tablet Take 1 tablet by mouth daily (with breakfast) 30 tablet 1    promethazine-dextromethorphan (PROMETHAZINE-DM) 6.25-15 MG/5ML syrup Take 5 mLs by mouth 4 times daily as needed for Cough 118 mL 0    atorvastatin (LIPITOR) 10 MG tablet Take 10 mg by mouth daily      warfarin (COUMADIN) 10 MG tablet TAKE 1/2 TABLET BY MOUTH DAILY EXCEPT ON THURSDAYS AND SUNDAYS TAKE ONE TABLET BY MOUTH DAILY OR AS DIRECTED BY COUMADIN CLINIC 90 tablet 1    amLODIPine (NORVASC) 5 MG tablet TAKE ONE TABLET BY MOUTH DAILY 90 tablet 1    metFORMIN (GLUCOPHAGE-XR) 500 MG extended release tablet TAKE ONE TABLET BY MOUTH DAILY WITH A MEAL 30 tablet 2    loratadine-pseudoephedrine (CLARITIN-D 12 HOUR) 5-120 MG per extended release tablet Take 1 tablet by mouth daily as needed (allergies)       Acetaminophen-Caffeine (EXCEDRIN TENSION HEADACHE) 500-65 MG TABS Take 2 tablets by mouth daily as needed (migraine)       esomeprazole Magnesium (NEXIUM) 40 MG PACK Take 40 mg by mouth daily.       Blood Glucose Monitoring Suppl (ACCU-CHEK DOMITILA PLUS) w/Device KIT 1 kit by Does not apply route (Patient not taking: Reported on 11/18/2021)      Lancets Thin MISC Test 2 time daily or as directed. (Patient not taking: Reported on 11/18/2021)      glucose blood VI test strips (ASCENSIA AUTODISC VI;ONE TOUCH ULTRA TEST VI) strip Place inside cheek (Patient not taking: Reported on 11/18/2021)       No current facility-administered medications for this visit. /70 (Site: Right Upper Arm, Position: Sitting, Cuff Size: Large Adult)   Pulse 102   Resp 18   Ht 6' 4\" (1.93 m)   Wt 222 lb 9.6 oz (101 kg)   SpO2 97%   BMI 27.10 kg/m²     Physical Exam  Musculoskeletal:      Comments: TTP base of left calcaneus  No Achilles tenderness  No mid or forefoot tenderness       Wt Readings from Last 3 Encounters:   01/07/22 222 lb 9.6 oz (101 kg)   07/16/21 218 lb (98.9 kg)   06/10/21 221 lb 6 oz (100.4 kg)     BP Readings from Last 3 Encounters:   01/07/22 118/70   07/16/21 126/73   06/10/21 132/76     Assessment/Plan:  1. Plantar fasciitis of left foot  Gave exercises and referral for podiatry  - External Referral To Podiatry    2. History of DVT (deep vein thrombosis) - no refill if INR not checked  - rivaroxaban (XARELTO) 20 MG TABS tablet; Take 1 tablet by mouth daily (with breakfast)  Dispense: 30 tablet; Refill: 1    3. Current use of anticoagulant therapy  - rivaroxaban (XARELTO) 20 MG TABS tablet; Take 1 tablet by mouth daily (with breakfast)  Dispense: 30 tablet; Refill: 1    For a number of reasons has had a hard time getting INR checked. Discussed importance of safety monitoring for this medication. We will try to get xarelto approved through his insurance.  Needs monitoring at least every 1-2 months for further refill if remaining on coumadin

## 2022-01-07 NOTE — PATIENT INSTRUCTIONS
weighted object, such as a soup can, on the other end of the towel. 1. While sitting, place your foot on a towel on the floor and scrunch the towel toward you with your toes. 2. Then, also using your toes, push the towel away from you. [place] pickups    1. Put marbles on the floor next to a cup.  2. Using your toes, try to lift the marbles up from the floor and put them in the cup. Follow-up care is a key part of your treatment and safety. Be sure to make and go to all appointments, and call your doctor if you are having problems. It's also a good idea to know your test results and keep a list of the medicines you take. Where can you learn more? Go to [email or web address] and sign in to your [employer] account. Enter L214 in the [employer] box to learn more about \"Plantar Fasciitis: Exercises. \"     If you do not have an account, please click on the \"Sign Up Now\" link. Current as of: July 1, 2021               Content Version: 13.1  © 2006-2021 Healthwise, Incorporated. Care instructions adapted under license by [facility] ([facility]). If you have questions about a medical condition or this instruction, always ask your healthcare professional. [address] any warranty or liability for your use of this information.

## 2022-01-12 DIAGNOSIS — I10 ESSENTIAL HYPERTENSION: ICD-10-CM

## 2022-01-12 DIAGNOSIS — E78.2 MIXED HYPERLIPIDEMIA: ICD-10-CM

## 2022-01-12 RX ORDER — AMLODIPINE BESYLATE 5 MG/1
TABLET ORAL
Qty: 90 TABLET | Refills: 1 | Status: SHIPPED | OUTPATIENT
Start: 2022-01-12 | End: 2022-08-19

## 2022-01-12 RX ORDER — ATORVASTATIN CALCIUM 20 MG/1
TABLET, FILM COATED ORAL
Qty: 90 TABLET | Refills: 1 | Status: SHIPPED | OUTPATIENT
Start: 2022-01-12 | End: 2022-08-19

## 2022-01-18 ENCOUNTER — TELEPHONE (OUTPATIENT)
Dept: FAMILY MEDICINE CLINIC | Age: 57
End: 2022-01-18

## 2022-01-18 NOTE — TELEPHONE ENCOUNTER
Called and spoke to pt   Pt said that he is still taking the warfarin  Going to start a PA to see if we  Can get it cheaper for pt   Will call pt when we have an update   Pt agrees with plan   Thank you

## 2022-01-18 NOTE — TELEPHONE ENCOUNTER
----- Message from [facility] sent at 1/12/2022  5:18 PM EST -----  Subject: Message to Provider    QUESTIONS  Information for Provider? Pt states he is replying with [facility]   prescription is $450.00  ---------------------------------------------------------------------------  --------------  CALL BACK INFO  What is the best way for the office to contact you? OK to leave message on   voicemail  Preferred Call Back Phone Number? [phone]  ---------------------------------------------------------------------------  --------------  SCRIPT ANSWERS  Relationship to Patient?  Self

## 2022-02-23 ENCOUNTER — TELEPHONE (OUTPATIENT)
Dept: FAMILY MEDICINE CLINIC | Age: 57
End: 2022-02-23

## 2022-02-23 DIAGNOSIS — J06.9 VIRAL URI WITH COUGH: ICD-10-CM

## 2022-02-23 RX ORDER — DEXTROMETHORPHAN HYDROBROMIDE AND PROMETHAZINE HYDROCHLORIDE 15; 6.25 MG/5ML; MG/5ML
5 SYRUP ORAL 4 TIMES DAILY PRN
Qty: 118 ML | Refills: 0 | Status: SHIPPED | OUTPATIENT
Start: 2022-02-23 | End: 2022-03-02

## 2022-02-23 NOTE — TELEPHONE ENCOUNTER
----- Message from Tejastelmaroxana quiros sent at 2/23/2022  2:17 PM EST -----  Subject: Message to Provider    QUESTIONS  Information for Provider? Pt wants to know if  can call in his cough   medicine  ---------------------------------------------------------------------------  --------------  CALL BACK INFO  What is the best way for the office to contact you? OK to leave message on   voicemail  Preferred Call Back Phone Number? [phone]  ---------------------------------------------------------------------------  --------------  SCRIPT ANSWERS  Relationship to Patient?  Self

## 2022-02-23 NOTE — TELEPHONE ENCOUNTER
Called and spoke with pt. Asked if he was not feeling well and why he needed a refill on meds. Pt states it's his sinus, he has tried a VV before but never gets it to work. He stated dr honeycutt told him to just call if he needed some.  This was filled in November, and January

## 2022-02-23 NOTE — TELEPHONE ENCOUNTER
rx sent. Tell Devon Segura he MUST monitor his INR or I will not be able to prescribe his warfarin anymore.

## 2022-02-24 DIAGNOSIS — Z86.718 HISTORY OF DVT (DEEP VEIN THROMBOSIS): ICD-10-CM

## 2022-02-24 LAB
INR BLD: 1.73 (ref 0.88–1.12)
PROTHROMBIN TIME: 20 SEC (ref 9.9–12.7)

## 2022-02-25 DIAGNOSIS — Z86.718 HISTORY OF DVT (DEEP VEIN THROMBOSIS): Primary | ICD-10-CM

## 2022-03-07 ENCOUNTER — TELEPHONE (OUTPATIENT)
Dept: FAMILY MEDICINE CLINIC | Age: 57
End: 2022-03-07

## 2022-03-07 RX ORDER — GABAPENTIN 300 MG/1
300 CAPSULE ORAL 2 TIMES DAILY
Qty: 30 CAPSULE | Refills: 0 | Status: SHIPPED | OUTPATIENT
Start: 2022-03-07 | End: 2022-04-29

## 2022-03-07 NOTE — TELEPHONE ENCOUNTER
Patient calling was seen at [facility] for neck. Pain is all the time not just when he turns. Pain is a level 8 or 9   Patient stating he cannot get into therapy until 3/23 waiting for mri results.    [facility] could not prescribe pain medication until 3/23 needed to contact pcp    Patient would like to know if something can be called in from now until 3.23   Pharmacy verified [facility]     Please advise

## 2022-03-07 NOTE — TELEPHONE ENCOUNTER
Limited options since he is on coumadin  I sent in gabapentin. Please let him know it can cause fatigue/dizziness so to take first only in evening. Only use during the day if not having side effects. Can use extra strength tylenol along with this.

## 2022-04-08 RX ORDER — WARFARIN SODIUM 10 MG/1
TABLET ORAL
Qty: 90 TABLET | Refills: 0 | Status: SHIPPED | OUTPATIENT
Start: 2022-04-08

## 2022-04-13 NOTE — TELEPHONE ENCOUNTER
[facility]
Called and spoke with pt, he said he hasn't been getting it checked because it's expensive. He is going Friday to get it checked  I told him he needs to go to the anticoagulation clinic to get it done from now on.   He said he will try and get better with getting it checked
rx sent  Please inform Memoel Paget, I won't be able to prescribe the warfarin any more. He has not been able to adhere to the checking that is needed for safety and it is unsafe for me to continue sending the medication without appropriate monitoring. If he'd like to see our anticoag clinic here at [facility] or a Hematology specialist he can do so  Thanks.
no

## 2022-04-15 DIAGNOSIS — Z86.718 HISTORY OF DVT (DEEP VEIN THROMBOSIS): ICD-10-CM

## 2022-04-15 LAB
INR BLD: 3.23 (ref 0.88–1.12)
PROTHROMBIN TIME: 38.3 SEC (ref 9.9–12.7)

## 2022-04-18 ENCOUNTER — TELEPHONE (OUTPATIENT)
Dept: PHARMACY | Age: 57
End: 2022-04-18

## 2022-04-18 DIAGNOSIS — Z86.718 HISTORY OF DVT (DEEP VEIN THROMBOSIS): Primary | ICD-10-CM

## 2022-04-18 NOTE — TELEPHONE ENCOUNTER
New referral for warfarin management. Was on warfarin in the recent past. Switched to Eliquis which he did not tolerate and then switched to Xarelto which was too costly. Scheduled for 4/22/22.     Roslyn Blackwell, PharmD, [address] [place] [address]  Anticoagulation Service  [phone]

## 2022-04-29 ENCOUNTER — ANTI-COAG VISIT (OUTPATIENT)
Dept: PHARMACY | Age: 57
End: 2022-04-29
Payer: COMMERCIAL

## 2022-04-29 DIAGNOSIS — Z86.718 HISTORY OF DVT (DEEP VEIN THROMBOSIS): Primary | ICD-10-CM

## 2022-04-29 LAB — INTERNATIONAL NORMALIZATION RATIO, POC: 3

## 2022-04-29 PROCEDURE — 85610 PROTHROMBIN TIME: CPT

## 2022-04-29 PROCEDURE — 99211 OFF/OP EST MAY X REQ PHY/QHP: CPT

## 2022-04-29 NOTE — PROGRESS NOTES
Melyssa Rodriguez is a 49 y.o. here for warfarin management. Quinn Briseno had an INR test today. Results were reviewed and appropriate warfarin management was completed. This visit was performed as: An in person visit. Protocols were followed with precautions to reduce the spread of COVID-19. Patient verifies current dosing regimen: Yes     Warfarin medication reviewed and updated on the patient 's home medication list: Yes   All other medications reviewed and updated on the patient 's home medication list: Yes: He no longer takes gabapentin. It was removed from his home list.    Lab Results   Component Value Date    INR 3.0 04/29/2022    INR 3.23 (H) 04/15/2022    INR 1.73 (H) 02/24/2022       Patient Findings     Positives:  Change in medications    Negatives:  Signs/symptoms of bleeding, Change in health, Missed doses, Change in diet/appetite, Bruising    Comments:  No longer takes gabapentin(made him feel funny)          Anticoagulation Summary  As of 4/29/2022    INR goal:  2.0-3.0   TTR:  --   INR used for dosing:  3.0 (4/29/2022)   Warfarin maintenance plan:  10 mg (10 mg x 1) every Sun, Thu; 5 mg (10 mg x 0.5) all other days   Weekly warfarin total:  45 mg   Plan last modified:  Estevan Bocanegra, [facility] (4/29/2022)   Next INR check:  5/13/2022   Target end date: Indefinite    Indications    History of DVT (deep vein thrombosis) - no refill if INR not checked [Z86.718]             Anticoagulation Episode Summary     INR check location:      Preferred lab:      Send INR reminders to:  [facility]    Comments:  [facility]      Anticoagulation Care Providers     Provider Role Specialty Phone number    Jennifer Alcaraz MD Referring Family Medicine [phone]          Warfarin assessment / plan: Milagros Soriano returns to our service after being on Eliquis(intolerance) and Xarelto(too expensive). His INR has been monitored by Dr. Pina Oakes for the past few months.  He has 10mg tablets and takes 5mg daily except 10mg on Sunday and Thursday. He has been on this dose for quite some time now. His INR on 4-15-22 was 3.23 and is 3.0 today. Since Ben Blackwell would like to eat more green vegetables, we will keep his weekly dose the same. We will check his INR in 2 weeks and make any changes at that time. He will call with any concerns or medication changes. Description    Continue: Warfarin 5mg(1/2 tablet) daily except 10mg(1 tablet) on Sunday and Thursday    Call [phone] with signs or symptoms of bleeding or ANY medication changes (including antibiotics, steroids, over-the-counter medications or herbal supplements). If significant bleeding occurs or if you fall and hit your head, please seek immediate medical attention. Keep the number of servings of vitamin K containing foods (dark green, leafy vegetables) the same each week. Please call if this changes. Limit alcohol intake. Please call if this changes. Immunization History   Administered Date(s) Administered    COVID-19, Pfizer Purple top, DILUTE for use, 12+ yrs, 30mcg/0.3mL dose 05/07/2021, 06/04/2021    Td vaccine (adult) 07/14/2015    Tdap (Boostrix, Adacel) 01/05/2019           Orders Placed This Encounter   Procedures    POCT INR     This external order was created through the results console. No orders of the defined types were placed in this encounter. Reviewed AVS with patient / caregiver.     Billing Points:  0 billing points this visit       CLINICAL PHARMACY CONSULT: MED RECONCILIATION/REVIEW ADDENDUM    For Pharmacy Admin Tracking Only     Intervention Detail:    Total # of Interventions Recommended: 0   Total # of Interventions Accepted: 0   Time Spent (min): 20

## 2022-05-04 ENCOUNTER — HOSPITAL ENCOUNTER (OUTPATIENT)
Age: 57
Discharge: HOME | End: 2022-05-04
Payer: COMMERCIAL

## 2022-05-04 ENCOUNTER — HOSPITAL ENCOUNTER (OUTPATIENT)
Dept: GENERAL RADIOLOGY | Age: 57
Discharge: HOME | End: 2022-05-04
Payer: COMMERCIAL

## 2022-05-04 DIAGNOSIS — H44.609: ICD-10-CM

## 2022-05-04 PROCEDURE — 70030 X-RAY EYE FOR FOREIGN BODY: CPT

## 2022-05-13 ENCOUNTER — ANTI-COAG VISIT (OUTPATIENT)
Dept: PHARMACY | Age: 57
End: 2022-05-13
Payer: COMMERCIAL

## 2022-05-13 DIAGNOSIS — Z86.718 HISTORY OF DVT (DEEP VEIN THROMBOSIS): Primary | ICD-10-CM

## 2022-05-13 LAB — INTERNATIONAL NORMALIZATION RATIO, POC: 2.7

## 2022-05-13 PROCEDURE — 85610 PROTHROMBIN TIME: CPT

## 2022-05-13 PROCEDURE — 99211 OFF/OP EST MAY X REQ PHY/QHP: CPT

## 2022-05-13 NOTE — PROGRESS NOTES
Hao Valerio is a 49 y.o. here for warfarin management. Irma De Leon had an INR test today. Results were reviewed and appropriate warfarin management was completed. This visit was performed as: An in person visit. Protocols were followed with precautions to reduce the spread of COVID-19. Patient verifies current dosing regimen: Yes     Warfarin medication reviewed and updated on the patient 's home medication list: Yes   All other medications reviewed and updated on the patient 's home medication list: No: no changes     Lab Results   Component Value Date    INR 2.7 2022    INR 3.0 2022    INR 3.23 (H) 04/15/2022       Patient Findings     Negatives:  Signs/symptoms of thrombosis, Signs/symptoms of bleeding, Change in medications, Change in diet/appetite, Bruising          Anticoagulation Summary  As of 2022    INR goal:  2.0-3.0   TTR:  100.0 % (4 d)   INR used for dosin.7 (2022)   Warfarin maintenance plan:  10 mg (10 mg x 1) every Sun, Thu; 5 mg (10 mg x 0.5) all other days   Weekly warfarin total:  45 mg   Plan last modified:  Conor Cerna [facility] (2022)   Next INR check:  6/10/2022   Priority:  Maintenance   Target end date: Indefinite    Indications    History of DVT (deep vein thrombosis) - no refill if INR not checked [Z86.718]             Anticoagulation Episode Summary     INR check location:      Preferred lab:      Send INR reminders to:  [facility] STAFF    Comments:  [facility]      Anticoagulation Care Providers     Provider Role Specialty Phone number    Mattie Lilly MD Referring Family Medicine [phone]          Warfarin assessment / plan:     Appears well. No changes. No acute findings. No change to warfarin therapy today.      Description    Continue: Warfarin 5mg (1/2 tablet) daily EXCEPT 10mg (1 tablet) on  and Thursday    Call [phone] with signs or symptoms of bleeding or ANY medication changes (including antibiotics, steroids, over-the-counter medications or herbal supplements). If significant bleeding occurs or if you fall and hit your head, please seek immediate medical attention. Keep the number of servings of vitamin K containing foods (dark green, leafy vegetables) the same each week. Please call if this changes. Limit alcohol intake. Please call if this changes. Immunization History   Administered Date(s) Administered    COVID-19, Pfizer Purple top, DILUTE for use, 12+ yrs, 30mcg/0.3mL dose 05/07/2021, 06/04/2021    Td vaccine (adult) 07/14/2015    Tdap (Boostrix, Adacel) 01/05/2019           Orders Placed This Encounter   Procedures    POCT INR     This external order was created through the results console. No orders of the defined types were placed in this encounter. Reviewed AVS with patient / caregiver.     Billing Points:  0 billing points this visit       CLINICAL PHARMACY CONSULT: MED RECONCILIATION/REVIEW ADDENDUM    For Pharmacy Admin Tracking Only     Intervention Detail:    Total # of Interventions Recommended: 0   Total # of Interventions Accepted: 0   Time Spent (min): 10

## 2022-06-02 DIAGNOSIS — J06.9 VIRAL URI WITH COUGH: ICD-10-CM

## 2022-06-02 RX ORDER — DEXTROMETHORPHAN HYDROBROMIDE AND PROMETHAZINE HYDROCHLORIDE 15; 6.25 MG/5ML; MG/5ML
5 SYRUP ORAL 4 TIMES DAILY PRN
Qty: 118 ML | Refills: 0 | Status: SHIPPED | OUTPATIENT
Start: 2022-06-02 | End: 2022-06-09

## 2022-06-02 NOTE — TELEPHONE ENCOUNTER
Pt said he is out of his cough medicine and has seen Dr. Flor Paul for this in the past. This recent cough start a week ago and does not have any other symptoms.     Pharm Confirmed - [facility] in [place]

## 2022-06-10 ENCOUNTER — ANTI-COAG VISIT (OUTPATIENT)
Dept: PHARMACY | Age: 57
End: 2022-06-10
Payer: COMMERCIAL

## 2022-06-10 ENCOUNTER — HOSPITAL ENCOUNTER (OUTPATIENT)
Age: 57
Discharge: HOME | End: 2022-06-10
Payer: COMMERCIAL

## 2022-06-10 ENCOUNTER — HOSPITAL ENCOUNTER (OUTPATIENT)
Dept: GENERAL RADIOLOGY | Age: 57
Discharge: HOME | End: 2022-06-10
Payer: COMMERCIAL

## 2022-06-10 DIAGNOSIS — M54.2 NECK PAIN: ICD-10-CM

## 2022-06-10 DIAGNOSIS — Z98.1 H/O SPINAL FUSION: ICD-10-CM

## 2022-06-10 LAB — INTERNATIONAL NORMALIZATION RATIO, POC: 2.8

## 2022-06-10 PROCEDURE — 72050 X-RAY EXAM NECK SPINE 4/5VWS: CPT

## 2022-06-10 PROCEDURE — 85610 PROTHROMBIN TIME: CPT

## 2022-06-10 PROCEDURE — 99211 OFF/OP EST MAY X REQ PHY/QHP: CPT

## 2022-06-10 NOTE — PROGRESS NOTES
Marissa Long is a 49 y.o. here for warfarin management. Lilliana Isaacs had an INR test today. Results were reviewed and appropriate warfarin management was completed. This visit was performed as: An in person visit. Protocols were followed with precautions to reduce the spread of COVID-19. Patient verifies current dosing regimen: Yes     Warfarin medication reviewed and updated on the patient 's home medication list: Yes   All other medications reviewed and updated on the patient 's home medication list: No: No changes     Lab Results   Component Value Date    INR 2.8 06/10/2022    INR 2.7 2022    INR 3.0 2022       Patient Findings     Negatives:  Signs/symptoms of thrombosis, Signs/symptoms of bleeding, Change in health, Missed doses, Change in medications, Change in diet/appetite, Bruising          Anticoagulation Summary  As of 6/10/2022    INR goal:  2.0-3.0   TTR:  100.0 % (1.1 mo)   INR used for dosin.8 (6/10/2022)   Warfarin maintenance plan:  10 mg (10 mg x 1) every Sun, Thu; 5 mg (10 mg x 0.5) all other days   Weekly warfarin total:  45 mg   Plan last modified:  Noah Smith [facility] (2022)   Next INR check:  2022   Priority:  Maintenance   Target end date: Indefinite    Indications    History of DVT (deep vein thrombosis) - no refill if INR not checked [Z86.718]             Anticoagulation Episode Summary     INR check location:      Preferred lab:      Send INR reminders to:  [facility]AFF    [facility]      Anticoagulation Care Providers     Provider Role Specialty Phone number    Patti Rodriguez MD Referring Family Medicine [phone]          Warfarin assessment / plan:     Appears well. No changes affecting warfarin therapy were noted. No acute findings. INR within goal range. No change to warfarin therapy today.        Description    Continue: Warfarin 5mg (1/2 tablet) daily EXCEPT 10mg (1 tablet) on  and Thursday    Call [phone] with signs or symptoms of bleeding or ANY medication changes (including antibiotics, steroids, over-the-counter medications or herbal supplements). If significant bleeding occurs or if you fall and hit your head, please seek immediate medical attention. Keep the number of servings of vitamin K containing foods (dark green, leafy vegetables) the same each week. Please call if this changes. Limit alcohol intake. Please call if this changes. Immunization History   Administered Date(s) Administered    COVID-19, Pfizer Purple top, DILUTE for use, 12+ yrs, 30mcg/0.3mL dose 05/07/2021, 06/04/2021    Td vaccine (adult) 07/14/2015    Tdap (Boostrix, Adacel) 01/05/2019           Orders Placed This Encounter   Procedures    POCT INR     This external order was created through the results console. No orders of the defined types were placed in this encounter. Reviewed AVS with patient / caregiver.     Billing Points:  0 billing points this visit       CLINICAL PHARMACY CONSULT: MED RECONCILIATION/REVIEW ADDENDUM    For Pharmacy Admin Tracking Only     Intervention Detail:    Total # of Interventions Recommended: 0   Total # of Interventions Accepted: 0   Time Spent (min): [address] By: Timmy Merino RPh, PRS 6/10/2022  8:50 AM    \

## 2022-06-14 ENCOUNTER — TELEPHONE (OUTPATIENT)
Dept: FAMILY MEDICINE CLINIC | Age: 57
End: 2022-06-14

## 2022-06-14 NOTE — TELEPHONE ENCOUNTER
[facility] is managing - I guess my name is on as co-signer, although Contreras Grullon know I'm not able to manage his INR anymore due to noncompliance  What is the procedure for?  That will help us know how long to hold it

## 2022-06-14 NOTE — TELEPHONE ENCOUNTER
Called Irish from [address] [place] [address] to let her know, however she said she already called the clinic and they told her that Dr. Buffy John has to give the clearance since he is the one who prescribed the medication

## 2022-06-14 NOTE — TELEPHONE ENCOUNTER
Please call her and let her know the anti coagulant clinic is handling that.   The coumadin clinic their phone number is [phone] thanks

## 2022-06-14 NOTE — TELEPHONE ENCOUNTER
Irish from [address] [place] [address] calling. They did not receive anything back from office about pt's coumadin. Faxed past order from 6.6.2022    Irish would like to know if Dr. Aj Buckner knows who is managing pt's coumadin.     Irish can be reached at [phone] Option 4

## 2022-06-16 NOTE — TELEPHONE ENCOUNTER
On coumadin for DVT history. Tell OK to hold for 5 days before and then resume previous dose after procedure done that day. Get protime 7-10 days after surgery.

## 2022-06-16 NOTE — TELEPHONE ENCOUNTER
Pt is having an epidural steroid injection in his back and would have to hold the warfrin for 5 days. The procedure isn't scheduled.   Call alonso [phone] opt 4  They need cardiac clearance

## 2022-07-08 ENCOUNTER — ANTI-COAG VISIT (OUTPATIENT)
Dept: PHARMACY | Age: 57
End: 2022-07-08
Payer: COMMERCIAL

## 2022-07-08 DIAGNOSIS — Z86.718 HISTORY OF DVT (DEEP VEIN THROMBOSIS): Primary | ICD-10-CM

## 2022-07-08 LAB — INTERNATIONAL NORMALIZATION RATIO, POC: 3.7

## 2022-07-08 PROCEDURE — 99211 OFF/OP EST MAY X REQ PHY/QHP: CPT

## 2022-07-08 PROCEDURE — 85610 PROTHROMBIN TIME: CPT

## 2022-07-08 NOTE — PROGRESS NOTES
Blanche Maxwell is a 49 y.o. here for warfarin management. Drake Arndt had an INR test today. Results were reviewed and appropriate warfarin management was completed. This visit was performed as: An in person visit. Protocols were followed with precautions to reduce the spread of COVID-19. Patient verifies current dosing regimen: Yes     Warfarin medication reviewed and updated on the patient 's home medication list: Yes   All other medications reviewed and updated on the patient 's home medication list: No: No changes     Lab Results   Component Value Date    INR 3.7 07/08/2022    INR 2.8 06/10/2022    INR 2.7 05/13/2022       Patient Findings     Positives:  Change in health    Negatives:  Signs/symptoms of thrombosis, Signs/symptoms of bleeding, Missed doses, Change in medications, Change in diet/appetite, Bruising    Comments:  Had a recent procedure 6/27 where warfarin was held          Anticoagulation Summary  As of 7/8/2022    INR goal:  2.0-3.0   TTR:  63.9 % (2 mo)   INR used for dosing:  3.7 (7/8/2022)   Warfarin maintenance plan:  10 mg (10 mg x 1) every Sun, Thu; 5 mg (10 mg x 0.5) all other days   Weekly warfarin total:  45 mg   Plan last modified:  Barbara Cabrera [facility] (4/29/2022)   Next INR check:  8/5/2022   Priority:  Maintenance   Target end date: Indefinite    Indications    History of DVT (deep vein thrombosis) - no refill if INR not checked [Z86.718]             Anticoagulation Episode Summary     INR check location:      Preferred lab:      Send INR reminders to:  [facility]NAGEMENT [facility]INICAL STAFF    Comments:  [facility]      Anticoagulation Care Providers     Provider Role Specialty Phone number    Joyce Carbajal MD Referring Family Medicine [phone]          Warfarin assessment / plan:     Appears well  Supra-therapeutic INR. Denies signs and symptoms of bleeding/bruising. Denies medication changes. Denies extra warfarin doses. Denies change in appetite.   Denies recent hospitalization / ED visit  Denies decrease in vitamin K intake. Denies cranberry or grapefruit juice intake. Denies changes in smoking habits. Denies fluid retention. Denies recent falls / injuries     Increased Alcohol intake with the recent holiday. Recent illness, fever or diarrhea. Diarrhea yesterday. Increased physical activity. For INR of 3.7, hold warfarin today (7-8-22). Likely due to increased alcohol over fourth of July weekend and diarrhea. Usually within range so hold dose today and return to usual dosing. Instructed to call us for signs and symptoms of bleeding and seek medical attention for any head injuries. Also advised to call if diarrhea or poor appetite continues for more than 2-3 days. Description    HOLD warfarin today (7-8-22) THEN  Continue: Warfarin 5mg (1/2 tablet) daily EXCEPT 10mg (1 tablet) on Sunday and Thursday    Call [phone] with signs or symptoms of bleeding or ANY medication changes (including antibiotics, steroids, over-the-counter medications or herbal supplements). If significant bleeding occurs or if you fall and hit your head, please seek immediate medical attention. Keep the number of servings of vitamin K containing foods (dark green, leafy vegetables) the same each week. Please call if this changes. Limit alcohol intake. Please call if this changes. Immunization History   Administered Date(s) Administered    COVID-19, PFIZER PURPLE top, DILUTE for use, (age 15 y+), 30mcg/0.3mL 05/07/2021, 06/04/2021    Td vaccine (adult) 07/14/2015    Tdap (Boostrix, Adacel) 01/05/2019           Orders Placed This Encounter   Procedures    POCT INR     This external order was created through the results console. No orders of the defined types were placed in this encounter. Reviewed AVS with patient / caregiver.     Billing Points:  Adjust dosage and/or reconcile meds (fill pill box) </= 5 medications - 2 points       CLINICAL PHARMACY CONSULT: MED RECONCILIATION/REVIEW ADDENDUM    For Pharmacy Admin Tracking Only     Intervention Detail: Dose Adjustment: 1, reason: Therapy Optimization   Total # of Interventions Recommended: 1   Total # of Interventions Accepted: 1   Time Spent (min): 20

## 2022-07-11 ENCOUNTER — TELEPHONE (OUTPATIENT)
Dept: FAMILY MEDICINE CLINIC | Age: 57
End: 2022-07-11

## 2022-08-05 ENCOUNTER — ANTI-COAG VISIT (OUTPATIENT)
Dept: PHARMACY | Age: 57
End: 2022-08-05
Payer: COMMERCIAL

## 2022-08-05 DIAGNOSIS — Z86.718 HISTORY OF DVT (DEEP VEIN THROMBOSIS): Primary | ICD-10-CM

## 2022-08-05 LAB — INTERNATIONAL NORMALIZATION RATIO, POC: 1.9

## 2022-08-05 PROCEDURE — 85610 PROTHROMBIN TIME: CPT

## 2022-08-05 PROCEDURE — 99211 OFF/OP EST MAY X REQ PHY/QHP: CPT

## 2022-08-05 NOTE — PROGRESS NOTES
Jessica Brannon is a 49 y.o. here for warfarin management. Lisa Ocasio had an INR test today. Results were reviewed and appropriate warfarin management was completed. This visit was performed as: An in person visit. Protocols were followed with precautions to reduce the spread of COVID-19. Patient verifies current dosing regimen: Yes     Warfarin medication reviewed and updated on the patient 's home medication list: Yes   All other medications reviewed and updated on the patient 's home medication list: No: no changes     Lab Results   Component Value Date    INR 1.9 2022    INR 3.7 2022    INR 2.8 06/10/2022       Patient Findings       Negatives:  Signs/symptoms of thrombosis, Signs/symptoms of bleeding, Change in health, Missed doses, Change in medications, Change in diet/appetite, Bruising            Anticoagulation Summary  As of 2022      INR goal:  2.0-3.0   TTR:  61.1 % (2.9 mo)   INR used for dosin.9 (2022)   Warfarin maintenance plan:  10 mg (10 mg x 1) every Sun, Thu; 5 mg (10 mg x 0.5) all other days   Weekly warfarin total:  45 mg   Plan last modified:  Romeo Ribeiro, [facility] (2022)   Next INR check:  2022   Priority:  Maintenance   Target end date: Indefinite    Indications    History of DVT (deep vein thrombosis) - no refill if INR not checked [Z86.718]                 Anticoagulation Episode Summary       INR check location:      Preferred lab:      Send INR reminders to:  [facility]NAGEMENT CLINICAL STAFF    Comments:  [facility]          Anticoagulation Care Providers       Provider Role Specialty Phone number    Zhao Truong MD Referring Family Medicine [phone]            Warfarin assessment / plan:     Appears well  Sub-therapeutic INR     Denies missed doses. Denies increased vitamin K intake. Denies medication changes. Denies signs or symptoms of clotting. Denies signs or symptoms of a stroke     Patient appears well. INR today slightly low at 1.9.  Will have the patient take 10mg warfarin today (8-5) only then continue the same dose, no weekly changes. Will see the patient back in 4 weeks to recheck INR. Description    TODAY ONLY: Take warfarin 10mg then  Continue: Warfarin 5mg (1/2 tablet) daily EXCEPT 10mg (1 tablet) on Sunday and Thursday    Call [phone] with signs or symptoms of bleeding or ANY medication changes (including antibiotics, steroids, over-the-counter medications or herbal supplements). If significant bleeding occurs or if you fall and hit your head, please seek immediate medical attention. Keep the number of servings of vitamin K containing foods (dark green, leafy vegetables) the same each week. Please call if this changes. Limit alcohol intake. Please call if this changes. Immunization History   Administered Date(s) Administered    COVID-19, PFIZER PURPLE top, DILUTE for use, (age 15 y+), 30mcg/0.3mL 05/07/2021, 06/04/2021    Td vaccine (adult) 07/14/2015    Tdap (Boostrix, Adacel) 01/05/2019           Orders Placed This Encounter   Procedures    POCT INR     This external order was created through the results console. No orders of the defined types were placed in this encounter. Reviewed AVS with patient / caregiver.     Billing Points:  Adjust dosage and/or reconcile meds (fill pill box) </= 5 medications - 2 points       CLINICAL PHARMACY CONSULT: MED RECONCILIATION/REVIEW ADDENDUM    For Pharmacy Admin Tracking Only    Intervention Detail: Dose Adjustment: 1, reason: Therapy Optimization  Total # of Interventions Recommended: 1  Total # of Interventions Accepted: 1  Time Spent (min): [address] [facility] maribell Guido  PharmD Candidate [id number]

## 2022-08-18 DIAGNOSIS — E78.2 MIXED HYPERLIPIDEMIA: ICD-10-CM

## 2022-08-18 DIAGNOSIS — I10 ESSENTIAL HYPERTENSION: ICD-10-CM

## 2022-08-19 RX ORDER — ATORVASTATIN CALCIUM 20 MG/1
TABLET, FILM COATED ORAL
Qty: 90 TABLET | Refills: 1 | Status: SHIPPED | OUTPATIENT
Start: 2022-08-19 | End: 2022-09-02 | Stop reason: SDUPTHER

## 2022-08-19 RX ORDER — AMLODIPINE BESYLATE 5 MG/1
TABLET ORAL
Qty: 90 TABLET | Refills: 1 | Status: SHIPPED | OUTPATIENT
Start: 2022-08-19 | End: 2022-09-02 | Stop reason: SDUPTHER

## 2022-09-02 ENCOUNTER — OFFICE VISIT (OUTPATIENT)
Dept: FAMILY MEDICINE CLINIC | Age: 57
End: 2022-09-02
Payer: COMMERCIAL

## 2022-09-02 ENCOUNTER — ANTI-COAG VISIT (OUTPATIENT)
Dept: PHARMACY | Age: 57
End: 2022-09-02
Payer: COMMERCIAL

## 2022-09-02 VITALS
WEIGHT: 212 LBS | OXYGEN SATURATION: 98 % | RESPIRATION RATE: 16 BRPM | SYSTOLIC BLOOD PRESSURE: 130 MMHG | BODY MASS INDEX: 25.82 KG/M2 | HEART RATE: 94 BPM | DIASTOLIC BLOOD PRESSURE: 80 MMHG | HEIGHT: 76 IN

## 2022-09-02 DIAGNOSIS — E78.2 MIXED HYPERLIPIDEMIA: ICD-10-CM

## 2022-09-02 DIAGNOSIS — R05.9 COUGH: ICD-10-CM

## 2022-09-02 DIAGNOSIS — Z86.718 HISTORY OF DVT (DEEP VEIN THROMBOSIS): Primary | ICD-10-CM

## 2022-09-02 DIAGNOSIS — Z72.0 TOBACCO USE: ICD-10-CM

## 2022-09-02 DIAGNOSIS — Z79.01 CURRENT USE OF ANTICOAGULANT THERAPY: ICD-10-CM

## 2022-09-02 DIAGNOSIS — R73.03 PREDIABETES: ICD-10-CM

## 2022-09-02 DIAGNOSIS — I10 ESSENTIAL HYPERTENSION: Primary | ICD-10-CM

## 2022-09-02 LAB — INTERNATIONAL NORMALIZATION RATIO, POC: 1.9

## 2022-09-02 PROCEDURE — 99214 OFFICE O/P EST MOD 30 MIN: CPT | Performed by: FAMILY MEDICINE

## 2022-09-02 PROCEDURE — 99211 OFF/OP EST MAY X REQ PHY/QHP: CPT

## 2022-09-02 PROCEDURE — 85610 PROTHROMBIN TIME: CPT

## 2022-09-02 RX ORDER — ATORVASTATIN CALCIUM 20 MG/1
TABLET, FILM COATED ORAL
Qty: 90 TABLET | Refills: 1 | Status: SHIPPED | OUTPATIENT
Start: 2022-09-02

## 2022-09-02 RX ORDER — AMLODIPINE BESYLATE 5 MG/1
TABLET ORAL
Qty: 90 TABLET | Refills: 1 | Status: SHIPPED | OUTPATIENT
Start: 2022-09-02

## 2022-09-02 RX ORDER — PROMETHAZINE HYDROCHLORIDE AND CODEINE PHOSPHATE 6.25; 1 MG/5ML; MG/5ML
5 SOLUTION ORAL EVERY 4 HOURS PRN
Qty: 280 ML | Refills: 0 | Status: CANCELLED | OUTPATIENT
Start: 2022-09-02 | End: 2022-09-12

## 2022-09-02 RX ORDER — DEXTROMETHORPHAN HYDROBROMIDE AND PROMETHAZINE HYDROCHLORIDE 15; 6.25 MG/5ML; MG/5ML
5 SYRUP ORAL 4 TIMES DAILY PRN
Qty: 118 ML | Refills: 0 | Status: SHIPPED | OUTPATIENT
Start: 2022-09-02 | End: 2022-09-09

## 2022-09-02 NOTE — PROGRESS NOTES
9/2/2022    This is a 49 y.o. 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 they live together    HTN  BP Readings from Last 3 Encounters:   09/02/22 130/80   01/07/22 118/70   07/16/21 126/73   On amlodipine    PreDiabetes  Lab Results   Component Value Date    LABA1C 5.9 07/05/2019     Lab Results   Component Value Date    .6 07/05/2019     Smoking  - 1ppd  - not ready to quit today    Review of Systems     Current Outpatient Medications   Medication Sig Dispense Refill    amLODIPine (NORVASC) 5 MG tablet TAKE ONE TABLET BY MOUTH DAILY 90 tablet 1    atorvastatin (LIPITOR) 20 MG tablet TAKE ONE TABLET BY MOUTH DAILY 90 tablet 1    promethazine-dextromethorphan (PROMETHAZINE-DM) 6.25-15 MG/5ML syrup Take 5 mLs by mouth 4 times daily as needed for Cough 118 mL 0    warfarin (COUMADIN) 10 MG tablet TAKE 1/2 TABLET BY MOUTH DAILY EXCEPT ON THURSDAYS AND SUNDAYS TAKE ONE TABLET BY MOUTH DAILY OR AS DIRECTED BY COUMADIN CLINIC 90 tablet 0    metFORMIN (GLUCOPHAGE-XR) 500 MG extended release tablet TAKE ONE TABLET BY MOUTH DAILY WITH A MEAL 30 tablet 2    loratadine-pseudoephedrine (CLARITIN-D 12HR) 5-120 MG per extended release tablet Take 1 tablet by mouth daily as needed (allergies)       Acetaminophen-Caffeine (EXCEDRIN TENSION HEADACHE) TABS Take 2 tablets by mouth daily as needed (migraine)       Blood Glucose Monitoring Suppl (ACCU-CHEK DOMITILA PLUS) w/Device KIT 1 kit by Does not apply route      Lancets Thin MISC Test 2 time daily or as directed. glucose blood VI test strips (ASCENSIA AUTODISC VI;ONE TOUCH ULTRA TEST VI) strip Place inside cheek      esomeprazole Magnesium (NEXIUM) 40 MG PACK Take 40 mg by mouth daily. No current facility-administered medications for this visit.        /80 (Site: Left Upper Arm, Position: Sitting, Cuff Size: Large Adult)   Pulse 94   Resp 16   Ht 6' 4\" (1.93 m)   Wt 212 lb (96.2 kg)   SpO2 98%   BMI 25.81 kg/m²     Physical Exam    Wt Readings from Last 3 Encounters:   09/02/22 212 lb (96.2 kg)   01/07/22 222 lb 9.6 oz (101 kg)   07/16/21 218 lb (98.9 kg)     BP Readings from Last 3 Encounters:   09/02/22 130/80   01/07/22 118/70   07/16/21 126/73     Assessment/Plan:  1. Essential hypertension  Well controlled on current regimen. No side effects from medications. Advise low salt diet and routine exercise  - amLODIPine (NORVASC) 5 MG tablet; TAKE ONE TABLET BY MOUTH DAILY  Dispense: 90 tablet; Refill: 1  - Comprehensive Metabolic Panel; Future    2. Mixed hyperlipidemia  Continue statin and check lipid panel  - atorvastatin (LIPITOR) 20 MG tablet; TAKE ONE TABLET BY MOUTH DAILY  Dispense: 90 tablet; Refill: 1  - Lipid Panel; Future    3. Current use of anticoagulant therapy  Following at coumadin clinic. Had side effects to and cost issues with DOACs    4. Prediabetes  Check A1c  - Hemoglobin A1C; Future    5. Cough  Chronic from smoking. 6. Tobacco use  Not ready to quit today. We discussed.     F/u 6 months for a physical

## 2022-09-02 NOTE — PROGRESS NOTES
Debbi Garnica is a 49 y.o. here for warfarin management. Glenn Chen had an INR test today. Results were reviewed and appropriate warfarin management was completed. This visit was performed as: An in person visit. Protocols were followed with precautions to reduce the spread of COVID-19. Patient verifies current dosing regimen: Yes     Warfarin medication reviewed and updated on the patient 's home medication list: Yes   All other medications reviewed and updated on the patient 's home medication list: No: No changes     Lab Results   Component Value Date    INR 1.9 2022    INR 1.9 2022    INR 3.7 2022       Patient Findings       Negatives:  Signs/symptoms of thrombosis, Signs/symptoms of bleeding, Change in health, Change in alcohol use, Change in activity, Missed doses, Change in medications, Change in diet/appetite, Bruising            Anticoagulation Summary  As of 2022      INR goal:  2.0-3.0   TTR:  46.4 % (3.9 mo)   INR used for dosin.9 (2022)   Warfarin maintenance plan:  10 mg (10 mg x 1) every Mon, Wed, Fri; 5 mg (10 mg x 0.5) all other days   Weekly warfarin total:  50 mg   Plan last modified:  [facility] (2022)   Next INR check:  2022   Priority:  Maintenance   Target end date: Indefinite    Indications    History of DVT (deep vein thrombosis) [Z86.718]                 Anticoagulation Episode Summary       INR check location:      Preferred lab:      Send INR reminders to:  [facility] STAFF    Comments:  [facility]          Anticoagulation Care Providers       Provider Role Specialty Phone number    Nuzhat Paz MD Referring Family Medicine [phone]            Warfarin assessment / plan:     Appears well  Sub-therapeutic INR     Denies missed doses. Denies increased vitamin K intake. Denies medication changes. Denies alcohol changes. Denies increased activity. Denies signs or symptoms of clotting.   Denies signs or symptoms of a stroke  Denies changes to smoking. Last INR was 1.9 and today his INR remains at 1.9. Instructed to increase weekly warfarin dose by 11%. Description    New Dose: Warfarin 5mg (1/2 tablet) daily EXCEPT 10mg (1 tablet) on Monday, Wednesday and Friday    Call [phone] with signs or symptoms of bleeding or ANY medication changes (including antibiotics, steroids, over-the-counter medications or herbal supplements). If significant bleeding occurs or if you fall and hit your head, please seek immediate medical attention. Keep the number of servings of vitamin K containing foods (dark green, leafy vegetables) the same each week. Please call if this changes. Limit alcohol intake. Please call if this changes. Immunization History   Administered Date(s) Administered    COVID-19, PFIZER PURPLE top, DILUTE for use, (age 15 y+), 30mcg/0.3mL 05/07/2021, 06/04/2021    Td vaccine (adult) 07/14/2015    Tdap (Boostrix, Adacel) 01/05/2019           Orders Placed This Encounter   Procedures    POCT INR     This external order was created through the results console. No orders of the defined types were placed in this encounter. Reviewed AVS with patient / caregiver.     Billing Points:  Adjust dosage and/or reconcile meds (fill pill box) </= 5 medications - 2 points       CLINICAL PHARMACY CONSULT: MED RECONCILIATION/REVIEW ADDENDUM    For Pharmacy Admin Tracking Only    Intervention Detail: Dose Adjustment: 1, reason: Therapy Optimization  Total # of Interventions Recommended: 1  Total # of Interventions Accepted: 1  Time Spent (min): 15

## 2022-09-06 ENCOUNTER — OFFICE VISIT (OUTPATIENT)
Dept: FAMILY MEDICINE CLINIC | Age: 57
End: 2022-09-06
Payer: COMMERCIAL

## 2022-09-06 ENCOUNTER — ANTI-COAG VISIT (OUTPATIENT)
Dept: PHARMACY | Age: 57
End: 2022-09-06
Payer: COMMERCIAL

## 2022-09-06 VITALS
DIASTOLIC BLOOD PRESSURE: 78 MMHG | HEIGHT: 76 IN | BODY MASS INDEX: 25.86 KG/M2 | OXYGEN SATURATION: 97 % | HEART RATE: 111 BPM | WEIGHT: 212.4 LBS | RESPIRATION RATE: 16 BRPM | SYSTOLIC BLOOD PRESSURE: 132 MMHG | TEMPERATURE: 98.8 F

## 2022-09-06 DIAGNOSIS — R31.0 GROSS HEMATURIA: Primary | ICD-10-CM

## 2022-09-06 LAB
BILIRUBIN, POC: NEGATIVE
BLOOD URINE, POC: NORMAL
CLARITY, POC: NORMAL
COLOR, POC: NORMAL
GLUCOSE URINE, POC: NEGATIVE
INTERNATIONAL NORMALIZATION RATIO, POC: 3.4
KETONES, POC: NEGATIVE
LEUKOCYTE EST, POC: NORMAL
NITRITE, POC: NEGATIVE
PH, POC: 7
PROTEIN, POC: NEGATIVE
SPECIFIC GRAVITY, POC: 1.02
UROBILINOGEN, POC: NORMAL

## 2022-09-06 PROCEDURE — 99214 OFFICE O/P EST MOD 30 MIN: CPT | Performed by: FAMILY MEDICINE

## 2022-09-06 PROCEDURE — 85610 PROTHROMBIN TIME: CPT

## 2022-09-06 PROCEDURE — 81002 URINALYSIS NONAUTO W/O SCOPE: CPT | Performed by: FAMILY MEDICINE

## 2022-09-06 PROCEDURE — 99211 OFF/OP EST MAY X REQ PHY/QHP: CPT

## 2022-09-06 RX ORDER — AMOXICILLIN AND CLAVULANATE POTASSIUM 875; 125 MG/1; MG/1
1 TABLET, FILM COATED ORAL 2 TIMES DAILY
Qty: 20 TABLET | Refills: 0 | Status: SHIPPED
Start: 2022-09-06 | End: 2022-09-09 | Stop reason: ALTCHOICE

## 2022-09-06 SDOH — ECONOMIC STABILITY: FOOD INSECURITY: WITHIN THE PAST 12 MONTHS, THE FOOD YOU BOUGHT JUST DIDN'T LAST AND YOU DIDN'T HAVE MONEY TO GET MORE.: NEVER TRUE

## 2022-09-06 SDOH — ECONOMIC STABILITY: FOOD INSECURITY: WITHIN THE PAST 12 MONTHS, YOU WORRIED THAT YOUR FOOD WOULD RUN OUT BEFORE YOU GOT MONEY TO BUY MORE.: NEVER TRUE

## 2022-09-06 ASSESSMENT — PATIENT HEALTH QUESTIONNAIRE - PHQ9
SUM OF ALL RESPONSES TO PHQ QUESTIONS 1-9: 13
1. LITTLE INTEREST OR PLEASURE IN DOING THINGS: 3
5. POOR APPETITE OR OVEREATING: 0
7. TROUBLE CONCENTRATING ON THINGS, SUCH AS READING THE NEWSPAPER OR WATCHING TELEVISION: 1
SUM OF ALL RESPONSES TO PHQ9 QUESTIONS 1 & 2: 6
4. FEELING TIRED OR HAVING LITTLE ENERGY: 3
10. IF YOU CHECKED OFF ANY PROBLEMS, HOW DIFFICULT HAVE THESE PROBLEMS MADE IT FOR YOU TO DO YOUR WORK, TAKE CARE OF THINGS AT HOME, OR GET ALONG WITH OTHER PEOPLE: 2
3. TROUBLE FALLING OR STAYING ASLEEP: 3
SUM OF ALL RESPONSES TO PHQ QUESTIONS 1-9: 13
9. THOUGHTS THAT YOU WOULD BE BETTER OFF DEAD, OR OF HURTING YOURSELF: 0
2. FEELING DOWN, DEPRESSED OR HOPELESS: 3
8. MOVING OR SPEAKING SO SLOWLY THAT OTHER PEOPLE COULD HAVE NOTICED. OR THE OPPOSITE, BEING SO FIGETY OR RESTLESS THAT YOU HAVE BEEN MOVING AROUND A LOT MORE THAN USUAL: 0
6. FEELING BAD ABOUT YOURSELF - OR THAT YOU ARE A FAILURE OR HAVE LET YOURSELF OR YOUR FAMILY DOWN: 0

## 2022-09-06 ASSESSMENT — SOCIAL DETERMINANTS OF HEALTH (SDOH): HOW HARD IS IT FOR YOU TO PAY FOR THE VERY BASICS LIKE FOOD, HOUSING, MEDICAL CARE, AND HEATING?: NOT HARD AT ALL

## 2022-09-06 NOTE — PROGRESS NOTES
2022    Blood pressure 132/78, pulse (!) 111, temperature 98.8 °F (37.1 °C), temperature source Oral, resp. rate 16, height 6' 4\" (1.93 m), weight 212 lb 6.4 oz (96.3 kg), SpO2 97 %. Max Mckinney (:  1965) is a 49 y.o. male, here for evaluation of the following medical concerns:    Chief Complaint   Patient presents with    Hematuria     X1 day. Slight burning. No history of kidney stones. Here for concern of blood in urine. Onset yesterday  First time this ha ever happened  Mild dysuria  Urine was pink- dripped a drop of blood after. Happens every urination. + urgency. + freq    No new meds. Only on metformin for DM. No f/c/ns  No abd pain or new back pain. He is a smoker  Works as a . On coumadin- recent INR 3.4- for recurrent DVT with Factor V leiden. But stopped coumadin for planned nerve block in neck soon. Patient Active Problem List   Diagnosis    Varicose veins of leg with swelling    Varicose veins of lower extremity with pain    Chronic venous hypertension with inflammation involving left side    Gastroesophageal reflux disease without esophagitis    History of DVT (deep vein thrombosis)    Prediabetes    Tobacco use    Mixed hyperlipidemia    Essential hypertension    Current use of anticoagulant therapy        Body mass index is 25.85 kg/m². Wt Readings from Last 3 Encounters:   22 212 lb 6.4 oz (96.3 kg)   22 212 lb (96.2 kg)   22 222 lb 9.6 oz (101 kg)       BP Readings from Last 3 Encounters:   22 132/78   22 130/80   22 118/70       No Known Allergies    Prior to Visit Medications    Medication Sig Taking?  Authorizing Provider   amLODIPine (NORVASC) 5 MG tablet TAKE ONE TABLET BY MOUTH DAILY Yes Rebecca Juarez MD   atorvastatin (LIPITOR) 20 MG tablet TAKE ONE TABLET BY MOUTH DAILY Yes Rebecca Juarez MD   promethazine-dextromethorphan (PROMETHAZINE-DM) 6.25-15 MG/5ML syrup Take 5 mLs by mouth 4 times daily as needed for Cough Yes Virginia Liu MD   warfarin (COUMADIN) 10 MG tablet TAKE 1/2 TABLET BY MOUTH DAILY EXCEPT ON THURSDAYS AND SUNDAYS TAKE ONE TABLET BY MOUTH DAILY OR AS DIRECTED BY COUMADIN CLINIC  Patient taking differently: Take 5 mg by mouth daily Except 10 mg on Monday, Wednesday and Friday or as directed by [facility] Yes Virginia Liu MD   metFORMIN (GLUCOPHAGE-XR) 500 MG extended release tablet TAKE ONE TABLET BY MOUTH DAILY WITH A MEAL Yes Esther Juarez MD   loratadine-pseudoephedrine (CLARITIN-D 12HR) 5-120 MG per extended release tablet Take 1 tablet by mouth daily as needed (allergies)  Yes Historical Provider, MD   Acetaminophen-Caffeine ([address] [place] [address]) TABS Take 2 tablets by mouth daily as needed (migraine)  Yes Historical Provider, MD   Blood Glucose Monitoring Suppl (ACCU-CHEK DOMITILA PLUS) w/Device KIT 1 kit by Does not apply route Yes Historical Provider, MD   Lancets Thin MISC Test 2 time daily or as directed. Yes Historical Provider, MD   glucose blood VI test strips (ASCENSIA AUTODISC VI;ONE TOUCH ULTRA TEST VI) strip Place inside cheek Yes Historical Provider, MD   esomeprazole Magnesium (NEXIUM) 40 MG PACK Take 40 mg by mouth daily. Yes Historical Provider, MD        Social History     Tobacco Use    Smoking status: Every Day     Packs/day: 0.50     Years: 35.00     Pack years: 17.50     Types: Cigarettes    Smokeless tobacco: Never   Substance Use Topics    Alcohol use: Yes     Comment: social    Drug use: No       Review of Systems    Physical Exam  Vitals and nursing note reviewed. Constitutional:       General: He is not in acute distress. Appearance: Normal appearance. He is well-developed. He is not diaphoretic. Cardiovascular:      Rate and Rhythm: Normal rate and regular rhythm. Pulses: Normal pulses. Heart sounds: Normal heart sounds. No murmur heard. Pulmonary:      Effort: Pulmonary effort is normal. No respiratory distress.       Breath sounds: Normal breath sounds. No wheezing or rales. Musculoskeletal:      Cervical back: Normal range of motion. Right lower leg: No edema. Left lower leg: No edema. Skin:     General: Skin is warm and dry. Neurological:      General: No focal deficit present. Mental Status: He is alert and oriented to person, place, and time. Mental status is at baseline. Psychiatric:         Mood and Affect: Mood normal.         Behavior: Behavior normal.         Thought Content: Thought content normal.         Judgment: Judgment normal.       ASSESSMENT/PLAN:    1. Gross hematuria  - ua pos for  blood, leukos. Will treat as UTI wit Augmentin. But discussed need to rule out more serious causes such as cancer. Ordered renal sono and referred to urology for consideration for cysto. - POCT Urinalysis no Micro  - Culture, Urine  - US RETROPERITONEAL LIMITED; Future  - [facility] - Shahrzad Gonzalez MD, Urology, [facility] SPECIAL[facility]    Return if symptoms worsen or fail to improve. An  PAYMILLignature was used to authenticate this note.     --Luella Severin, MD on 9/6/2022 at 4:37 PM

## 2022-09-06 NOTE — PROGRESS NOTES
Olya Walden is a 49 y.o. here for warfarin management. Lynette Self had an INR test today. Results were reviewed and appropriate warfarin management was completed. This visit was performed as: An in person visit. Protocols were followed with precautions to reduce the spread of COVID-19. Patient verifies current dosing regimen: Yes     Warfarin medication reviewed and updated on the patient 's home medication list: Yes   All other medications reviewed and updated on the patient 's home medication list: No new medications     Lab Results   Component Value Date    INR 3.4 09/06/2022    INR 1.9 09/02/2022    INR 1.9 08/05/2022       Patient Findings       Negatives:  Signs/symptoms of thrombosis, Signs/symptoms of bleeding, Change in health, Missed doses, Change in medications, Change in diet/appetite, Bruising            Anticoagulation Summary  As of 9/6/2022      INR goal:  2.0-3.0   TTR:  47.1 % (4 mo)   INR used for dosing:  3.4 (9/6/2022)   Warfarin maintenance plan:  10 mg (10 mg x 1) every Mon, Wed, Fri; 5 mg (10 mg x 0.5) all other days   Weekly warfarin total:  50 mg   Plan last modified:  NIKOLE Chew [facility] (9/2/2022)   Next INR check:  9/30/2022   Priority:  Maintenance   Target end date: Indefinite    Indications    History of DVT (deep vein thrombosis) [Z86.718]                 Anticoagulation Episode Summary       INR check location:      Preferred lab:      Send INR reminders to:  [facility]NAGEMENT CLINICAL STAFF    Comments:  [facility]          Anticoagulation Care Providers       Provider Role Specialty Phone number    Alyson Ryan MD Referring Family Medicine [phone]            Warfarin assessment / plan:     Appears well  Supra-therapeutic INR. Denies medication changes. Denies extra warfarin doses. Denies increased alcohol intake. Denies change in appetite. Denies illness, fever, vomiting or diarrhea. Denies decrease in vitamin K intake. Walk-in patient today.   He states he has blood in his urine and pain with urination. He will be holding his warfarin today due to an INR of 3.4. He will be holding his warfarin on 9-7 thru 9-11-22 for a procedure. He has an MD appointment today due to blood in urine/pain on urination. Can not return to the clinic until 9-30-22. Description    Hold warfarin on 9-6 thru 9-11-22 due to the procedure  If MD states to restart warfarin on the evening of the procedure restart with 10 mg on 9-12, 9-13, 9-14-22  CONTINUE: Warfarin 5mg (1/2 tablet) daily EXCEPT 10mg (1 tablet) on Monday, Wednesday and Friday    Call [phone] with signs or symptoms of bleeding or ANY medication changes (including antibiotics, steroids, over-the-counter medications or herbal supplements). If significant bleeding occurs or if you fall and hit your head, please seek immediate medical attention. Keep the number of servings of vitamin K containing foods (dark green, leafy vegetables) the same each week. Please call if this changes. Limit alcohol intake. Please call if this changes. Immunization History   Administered Date(s) Administered    COVID-19, PFIZER PURPLE top, DILUTE for use, (age 15 y+), 30mcg/0.3mL 05/07/2021, 06/04/2021    Td vaccine (adult) 07/14/2015    Tdap (Boostrix, Adacel) 01/05/2019           Orders Placed This Encounter   Procedures    POCT INR     This external order was created through the results console. No orders of the defined types were placed in this encounter. Reviewed AVS with patient / caregiver.     Billing Points:  Adjust dosage and/or reconcile meds (fill pill box) </= 5 medications - 2 points       CLINICAL PHARMACY CONSULT: MED RECONCILIATION/REVIEW ADDENDUM    For Pharmacy Admin Tracking Only    Intervention Detail: Dose Adjustment: 1, reason: Therapy Optimization  Total # of Interventions Recommended: 3  Total # of Interventions Accepted: 3  Time Spent (min): 15

## 2022-09-09 LAB
ORGANISM: ABNORMAL
URINE CULTURE, ROUTINE: ABNORMAL

## 2022-09-09 RX ORDER — CEFUROXIME AXETIL 500 MG/1
500 TABLET ORAL 2 TIMES DAILY
Qty: 20 TABLET | Refills: 0 | Status: SHIPPED | OUTPATIENT
Start: 2022-09-09 | End: 2022-09-19

## 2022-09-30 ENCOUNTER — ANTI-COAG VISIT (OUTPATIENT)
Dept: PHARMACY | Age: 57
End: 2022-09-30
Payer: COMMERCIAL

## 2022-09-30 DIAGNOSIS — Z86.718 HISTORY OF DVT (DEEP VEIN THROMBOSIS): Primary | ICD-10-CM

## 2022-09-30 LAB — INTERNATIONAL NORMALIZATION RATIO, POC: 2.7

## 2022-09-30 PROCEDURE — 85610 PROTHROMBIN TIME: CPT

## 2022-09-30 PROCEDURE — 99211 OFF/OP EST MAY X REQ PHY/QHP: CPT

## 2022-09-30 NOTE — PROGRESS NOTES
Ramos Collins is a 49 y.o. here for warfarin management. Margarito Sprague had an INR test today. Results were reviewed and appropriate warfarin management was completed. This visit was performed as: An in person visit. Protocols were followed with precautions to reduce the spread of COVID-19. Patient verifies current dosing regimen: Yes     Warfarin medication reviewed and updated on the patient 's home medication list: Yes   All other medications reviewed and updated on the patient 's home medication list: No new medications     Lab Results   Component Value Date    INR 2.7 2022    INR 3.4 2022    INR 1.9 2022       Patient Findings       Positives:  Upcoming invasive procedure    Negatives:  Signs/symptoms of thrombosis, Signs/symptoms of bleeding, Change in medications, Change in diet/appetite, Bruising    Comments:  Neve block procedure 10/14. Instructed by physician to hold 5 day prior            Anticoagulation Summary  As of 2022      INR goal:  2.0-3.0   TTR:  46.4 % (4.8 mo)   INR used for dosin.7 (2022)   Warfarin maintenance plan:  10 mg (10 mg x 1) every Mon, Wed, Fri; 5 mg (10 mg x 0.5) all other days   Weekly warfarin total:  50 mg   Plan last modified:  Jsoe Patterson [facility] (2022)   Next INR check:  10/28/2022   Priority:  Maintenance   Target end date: Indefinite    Indications    History of DVT (deep vein thrombosis) [Z86.718]                 Anticoagulation Episode Summary       INR check location:      Preferred lab:      Send INR reminders to:  [facility]NAGEMENT CLINICAL STAFF    Comments:  [facility]          Anticoagulation Care Providers       Provider Role Specialty Phone number    Drea Alfaro MD Referring Family Medicine [phone]            Warfarin assessment / plan:     Appears well. No acute findings. INR within goal range. Upcoming nerve block procedure 10/14/22. Will hold for 5 days prior. No change to warfarin therapy today.      Will have him take a higher dose the Saturday and Sunday after his procedure to help reach INR goal sooner. Otherwise will take his regular weekly warfarin dose when not holding warfarin. This same plan worked well for his similar procedure 9/12/22. Description    Hold warfarin on 10/9 thru 10/14 for your procedure 10/14. If MD states to restart warfarin on the evening of the procedure restart with 10 mg on 10/14, 10/15, and 10/16  THEN CONTINUE: Warfarin 5mg (1/2 tablet) daily EXCEPT 10mg (1 tablet) on Monday, Wednesday and Friday    Call [phone] with signs or symptoms of bleeding or ANY medication changes (including antibiotics, steroids, over-the-counter medications or herbal supplements). If significant bleeding occurs or if you fall and hit your head, please seek immediate medical attention. Keep the number of servings of vitamin K containing foods (dark green, leafy vegetables) the same each week. Please call if this changes. Limit alcohol intake. Please call if this changes. Immunization History   Administered Date(s) Administered    COVID-19, PFIZER PURPLE top, DILUTE for use, (age 15 y+), 30mcg/0.3mL 05/07/2021, 06/04/2021    Td vaccine (adult) 07/14/2015    Tdap (Boostrix, Adacel) 01/05/2019           Orders Placed This Encounter   Procedures    POCT INR     This external order was created through the results console. No orders of the defined types were placed in this encounter. Reviewed AVS with patient / caregiver.     Billing Points:  Adjust dosage and/or reconcile meds (fill pill box) </= 5 medications - 2 points       CLINICAL PHARMACY CONSULT: MED RECONCILIATION/REVIEW ADDENDUM    For Pharmacy Admin Tracking Only    Intervention Detail: Dose Adjustment: 1, reason: Therapy Optimization  Total # of Interventions Recommended: 1  Total # of Interventions Accepted: 1  Time Spent (min): 15

## 2022-10-28 ENCOUNTER — ANTI-COAG VISIT (OUTPATIENT)
Dept: PHARMACY | Age: 57
End: 2022-10-28
Payer: COMMERCIAL

## 2022-10-28 DIAGNOSIS — Z86.718 HISTORY OF DVT (DEEP VEIN THROMBOSIS): Primary | ICD-10-CM

## 2022-10-28 LAB — INR BLD: 1.9

## 2022-10-28 PROCEDURE — 85610 PROTHROMBIN TIME: CPT

## 2022-10-28 PROCEDURE — 99211 OFF/OP EST MAY X REQ PHY/QHP: CPT

## 2022-10-28 NOTE — PROGRESS NOTES
Uvaldo Javier is a 49 y.o. here for warfarin management. Treva Gabriel had an INR test today. Results were reviewed and appropriate warfarin management was completed. This visit was performed as: An in person visit. Protocols were followed with precautions to reduce the spread of COVID-19. Patient verifies current warfarin dosing regimen: Yes     Warfarin medication reviewed and updated on the patient 's home medication list: Yes   All other medications reviewed and updated on the patient 's home medication list: No: No medication changes     Lab Results   Component Value Date    INR 1.90 10/28/2022    INR 2.7 2022    INR 3.4 2022       Patient Findings       Positives:  Upcoming invasive procedure, Missed doses    Negatives:  Signs/symptoms of thrombosis, Signs/symptoms of bleeding, Change in health, Change in medications, Change in diet/appetite, Bruising    Comments:  Had nerve block on 10/24 - Held warfarin 5 days prior (10/19-10/23)   Upcoming cystoscopy on   Upcoming nerve block on             Anticoagulation Summary  As of 10/28/2022      INR goal:  2.0-3.0   TTR:  53.0 % (5.7 mo)   INR used for dosin.90 (10/28/2022)   Warfarin maintenance plan:  10 mg (10 mg x 1) every Mon, Wed, Fri; 5 mg (10 mg x 0.5) all other days; Starting 10/28/2022   Weekly warfarin total:  50 mg   Plan last modified:  Padilla Jonas, [facility] (2022)   Next INR check:  2022   Priority:  Maintenance   Target end date: Indefinite    Indications    History of DVT (deep vein thrombosis) [Z86.718]                 Anticoagulation Episode Summary       INR check location:      Preferred lab:      Send INR reminders to:  [facility] MANAGEMENT CLINICAL STAFF    Comments:  [facility]          Anticoagulation Care Providers       Provider Role Specialty Phone number    Henry Johns MD Referring Family Medicine [phone]            There were no vitals taken for this visit.     Warfarin assessment / plan:     Appears well  Sub-therapeutic INR     Denies increased vitamin K intake. Denies medication changes. Denies signs or symptoms of clotting. Denies signs or symptoms of a stroke     Missed dose(s) Nerve block on 10/24 - Held warfarin 5 days prior. INR 1.9 today due to holding warfarin prior to procedure on 10/24. Patient took extra warfarin dose of 10 mg on 10/25. Patient instructed to take increased dose of warfarin today (10/28) ONLY, and then continue on regular dosing schedule. Patient has a cystoscopy scheduled for 11/14. He would like us to reach out to the doctor to find out how he should be holding his warfarin prior to procedure. Sent message to Dr. Pardeep Lopez. Patient has another upcoming nerve block on 11/21. Patient to hold warfarin 5 days prior to procedure per physician instructions on 11/21 (11/16-11/20). Will resume warfarin the day of procedure unless instructed otherwise. Take increased dose of 10 mg the day after procedure. (Same instructions as the last 2 nerve blocks he has had). Patient will return 4 days after his procedure on 11/24. Description    Take warfarin 15 mg today (10/28) ONLY. Then CONTINUE: Warfarin 5mg (1/2 tablet) daily EXCEPT 10mg (1 tablet) on Monday, Wednesday and Friday    Hold warfarin 5 days prior to nerve ablation on 11/21 (11/16-11/20) as instructed by your physician. Resume warfarin the day of procedure unless instructed otherwise. Take increased dose of 10 mg the day after procedure. Call [phone] with signs or symptoms of bleeding or ANY medication changes (including antibiotics, steroids, over-the-counter medications or herbal supplements). If significant bleeding occurs or if you fall and hit your head, please seek immediate medical attention. Keep the number of servings of vitamin K containing foods (dark green, leafy vegetables) the same each week. Please call if this changes. Limit alcohol intake. Please call if this changes. Immunization History   Administered Date(s) Administered    COVID-19, PFIZER PURPLE top, DILUTE for use, (age 15 y+), 30mcg/0.3mL 05/07/2021, 06/04/2021    Td vaccine (adult) 07/14/2015    Tdap (Boostrix, Adacel) 01/05/2019           Orders Placed This Encounter   Procedures    Protime-INR     This external order was created through the results console. No orders of the defined types were placed in this encounter. Reviewed AVS with patient / caregiver.     Billing Points:  Adjust dosage and/or reconcile meds (fill pill box) </= 5 medications - 2 points       CLINICAL PHARMACY CONSULT: MED RECONCILIATION/REVIEW ADDENDUM    For Pharmacy Admin Tracking Only    Intervention Detail: Dose Adjustment: 1, reason: Therapy Optimization  Total # of Interventions Recommended: 1  Total # of Interventions Accepted: 1  Time Spent (min): 4564 Miguel Angel Bruce of [address] [place] [address]  PharmD Candidate [id number]

## 2022-11-05 ENCOUNTER — HOSPITAL ENCOUNTER (OUTPATIENT)
Dept: CT IMAGING | Age: 57
Discharge: HOME | End: 2022-11-05
Payer: COMMERCIAL

## 2022-11-05 DIAGNOSIS — R31.0 GROSS HEMATURIA: ICD-10-CM

## 2022-11-05 PROCEDURE — 74178 CT ABD&PLV WO CNTR FLWD CNTR: CPT | Performed by: UROLOGY

## 2022-11-06 ENCOUNTER — HOSPITAL ENCOUNTER (OUTPATIENT)
Age: 57
Discharge: HOME | End: 2022-11-06

## 2022-11-06 PROCEDURE — 6360000004 HC RX CONTRAST MEDICATION: Performed by: FAMILY MEDICINE

## 2022-11-06 RX ADMIN — IOPAMIDOL 75 ML: 755 INJECTION, SOLUTION INTRAVENOUS at 19:51

## 2022-11-07 NOTE — PROGRESS NOTES
C-diff Questionnaire:     * Admitted with diarrhea? [] YES    [x]  NO     *Prior history of C-Diff. In last 3 months? [] YES    [x]  NO     *Antibiotic use in the past 6-8 weeks? [x]  NO    []  YES      If yes, which: REASON_________________     *Prior hospitalization or nursing home in the last month? []  YES    [x]  NO     [facility]FETY FIRST. .call before you fall    [address] time___1415      Surgery time_1550    Do not eat or drink anything after 12:00 midnight prior to your surgery. This includes water chewing gum, mints and ice chips- the Day of Surgery. You may brush your teeth and gargle the morning of your surgery, but do not swallow the water     Please see your family doctor/pediatrician for a history and physical and/or questions concerning medications. Bring any test results/reports from your physicians office. If you are under the care of a heart doctor or specialist doctor, please be aware that you may be asked to them for clearance    You may be asked to stop blood thinners such as Coumadin, Plavix, Fragmin, Lovenox, etc., or any anti-inflammatories such as:  Aspirin, Ibuprofen, Advil, Naproxen prior to your surgery. We also ask that you stop any OTC medications such as fish oil, vitamin E, glucosamine, garlic, Multivitamins, COQ 10, etc.    We ask that you do not smoke 24 hours prior to surgery  We ask that you do not  drink any alcoholic beverages 24 hours prior to surgery     You must make arrangements for a responsible adult to take you home after your surgery. For your safety you will not be allowed to leave alone or drive yourself home. Your surgery will be cancelled if you do not have a ride home. Also for your safety, it is strongly suggested that someone stay with you the first 24 hours after your surgery.      A parent or legal guardian must accompany a child scheduled for surgery and plan to stay at the hospital until the child is discharged. Please do not bring other children with you. For your comfort, please wear simple loose fitting clothing to the hospital.  Please do not bring valuables. Do not wear any make-up or nail polish on your fingers or toes. For your safety, please do not wear any jewelry or body piercing's on the day of surgery. All jewelry must be removed. If you have dentures, they will be removed before going to operating room. For your convenience, we will provide you with a container. If you wear contact lenses or glasses, they will be removed, please bring a case for them. If you have a living will and a durable power of  for healthcare, please bring in a copy. As part of our patient safety program to minimize surgical site infections, we ask you to do the following:    Please notify your surgeon if you develop any illness between         now and the day of your surgery. This includes a cough, cold, fever, sore throat, nausea,         or vomiting, and diarrhea, etc.   Please notify your surgeon if you experience dizziness, shortness         of breath or blurred vision between now and the time of your surgery. Do not shave your operative site 96 hours prior to surgery. For face and neck surgery, men may use an electric razor 48 hours   prior to surgery. You may shower the night before surgery or the morning of   your surgery with an antibacterial soap. You will need to bring a photo ID and insurance card     If you use a C-pap or Bi-pap machine, please bring your machine with you to the hospital     Our goal is to provide you with excellent care, therefore, visitors will be limited to so that we may focus on providing this care for you. Please contact your surgeon office, if you have any further questions.                  [facility] phone number:  [address] PAT fax number:  [phone]    Please note these are generalized instructions for all surgical cases, you may be provided with more specific instructions according to your surgery.

## 2022-11-11 ENCOUNTER — ANESTHESIA EVENT (OUTPATIENT)
Dept: OPERATING ROOM | Age: 57
End: 2022-11-11
Payer: COMMERCIAL

## 2022-11-14 ENCOUNTER — HOSPITAL ENCOUNTER (OUTPATIENT)
Age: 57
Setting detail: OUTPATIENT SURGERY
Discharge: HOME | End: 2022-11-14
Attending: UROLOGY | Admitting: UROLOGY
Payer: COMMERCIAL

## 2022-11-14 ENCOUNTER — ANESTHESIA (OUTPATIENT)
Dept: OPERATING ROOM | Age: 57
End: 2022-11-14
Payer: COMMERCIAL

## 2022-11-14 VITALS
HEART RATE: 87 BPM | RESPIRATION RATE: 18 BRPM | WEIGHT: 215 LBS | HEIGHT: 76 IN | TEMPERATURE: 97.6 F | BODY MASS INDEX: 26.18 KG/M2 | SYSTOLIC BLOOD PRESSURE: 148 MMHG | OXYGEN SATURATION: 99 % | DIASTOLIC BLOOD PRESSURE: 89 MMHG

## 2022-11-14 PROBLEM — R31.0 GROSS HEMATURIA: Status: ACTIVE | Noted: 2022-11-14

## 2022-11-14 LAB
GLUCOSE BLD-MCNC: 134 MG/DL (ref 70–99)
GLUCOSE BLD-MCNC: 87 MG/DL (ref 70–99)
INR BLD: 1.46 (ref 0.87–1.14)
PERFORMED ON: ABNORMAL
PERFORMED ON: NORMAL
PROTHROMBIN TIME: 17.7 SEC (ref 11.7–14.5)

## 2022-11-14 PROCEDURE — 7100000011 HC PHASE II RECOVERY - ADDTL 15 MIN: Performed by: UROLOGY

## 2022-11-14 PROCEDURE — 7100000001 HC PACU RECOVERY - ADDTL 15 MIN: Performed by: UROLOGY

## 2022-11-14 PROCEDURE — 6360000002 HC RX W HCPCS: Performed by: NURSE ANESTHETIST, CERTIFIED REGISTERED

## 2022-11-14 PROCEDURE — 2580000003 HC RX 258: Performed by: ANESTHESIOLOGY

## 2022-11-14 PROCEDURE — 3700000001 HC ADD 15 MINUTES (ANESTHESIA): Performed by: UROLOGY

## 2022-11-14 PROCEDURE — 6360000002 HC RX W HCPCS: Performed by: UROLOGY

## 2022-11-14 PROCEDURE — 85610 PROTHROMBIN TIME: CPT

## 2022-11-14 PROCEDURE — 7100000010 HC PHASE II RECOVERY - FIRST 15 MIN: Performed by: UROLOGY

## 2022-11-14 PROCEDURE — 2580000003 HC RX 258: Performed by: UROLOGY

## 2022-11-14 PROCEDURE — 2500000003 HC RX 250 WO HCPCS: Performed by: NURSE ANESTHETIST, CERTIFIED REGISTERED

## 2022-11-14 PROCEDURE — 7100000000 HC PACU RECOVERY - FIRST 15 MIN: Performed by: UROLOGY

## 2022-11-14 PROCEDURE — 3700000000 HC ANESTHESIA ATTENDED CARE: Performed by: UROLOGY

## 2022-11-14 PROCEDURE — 2709999900 HC NON-CHARGEABLE SUPPLY: Performed by: UROLOGY

## 2022-11-14 PROCEDURE — 3600000012 HC SURGERY LEVEL 2 ADDTL 15MIN: Performed by: UROLOGY

## 2022-11-14 PROCEDURE — 2580000003 HC RX 258: Performed by: NURSE ANESTHETIST, CERTIFIED REGISTERED

## 2022-11-14 PROCEDURE — 3600000002 HC SURGERY LEVEL 2 BASE: Performed by: UROLOGY

## 2022-11-14 RX ORDER — DIPHENHYDRAMINE HYDROCHLORIDE 50 MG/ML
12.5 INJECTION INTRAMUSCULAR; INTRAVENOUS
Status: DISCONTINUED | OUTPATIENT
Start: 2022-11-14 | End: 2022-11-14 | Stop reason: HOSPADM

## 2022-11-14 RX ORDER — PROPOFOL 10 MG/ML
INJECTION, EMULSION INTRAVENOUS CONTINUOUS PRN
Status: DISCONTINUED | OUTPATIENT
Start: 2022-11-14 | End: 2022-11-14 | Stop reason: SDUPTHER

## 2022-11-14 RX ORDER — SODIUM CHLORIDE 0.9 % (FLUSH) 0.9 %
5-40 SYRINGE (ML) INJECTION PRN
Status: DISCONTINUED | OUTPATIENT
Start: 2022-11-14 | End: 2022-11-14 | Stop reason: HOSPADM

## 2022-11-14 RX ORDER — SODIUM CHLORIDE 0.9 % (FLUSH) 0.9 %
5-40 SYRINGE (ML) INJECTION EVERY 12 HOURS SCHEDULED
Status: DISCONTINUED | OUTPATIENT
Start: 2022-11-14 | End: 2022-11-14 | Stop reason: HOSPADM

## 2022-11-14 RX ORDER — HALOPERIDOL 5 MG/ML
1 INJECTION INTRAMUSCULAR
Status: DISCONTINUED | OUTPATIENT
Start: 2022-11-14 | End: 2022-11-14 | Stop reason: HOSPADM

## 2022-11-14 RX ORDER — FENTANYL CITRATE 50 UG/ML
INJECTION, SOLUTION INTRAMUSCULAR; INTRAVENOUS PRN
Status: DISCONTINUED | OUTPATIENT
Start: 2022-11-14 | End: 2022-11-14 | Stop reason: SDUPTHER

## 2022-11-14 RX ORDER — OXYCODONE HYDROCHLORIDE 5 MG/1
5 TABLET ORAL
Status: DISCONTINUED | OUTPATIENT
Start: 2022-11-14 | End: 2022-11-14 | Stop reason: HOSPADM

## 2022-11-14 RX ORDER — MIDAZOLAM HYDROCHLORIDE 1 MG/ML
INJECTION INTRAMUSCULAR; INTRAVENOUS PRN
Status: DISCONTINUED | OUTPATIENT
Start: 2022-11-14 | End: 2022-11-14 | Stop reason: SDUPTHER

## 2022-11-14 RX ORDER — SODIUM CHLORIDE 9 MG/ML
INJECTION, SOLUTION INTRAVENOUS CONTINUOUS PRN
Status: DISCONTINUED | OUTPATIENT
Start: 2022-11-14 | End: 2022-11-14 | Stop reason: SDUPTHER

## 2022-11-14 RX ORDER — LORAZEPAM 2 MG/ML
0.5 INJECTION INTRAMUSCULAR
Status: DISCONTINUED | OUTPATIENT
Start: 2022-11-14 | End: 2022-11-14 | Stop reason: HOSPADM

## 2022-11-14 RX ORDER — SODIUM CHLORIDE 9 MG/ML
INJECTION, SOLUTION INTRAVENOUS PRN
Status: DISCONTINUED | OUTPATIENT
Start: 2022-11-14 | End: 2022-11-14 | Stop reason: HOSPADM

## 2022-11-14 RX ORDER — ONDANSETRON 2 MG/ML
4 INJECTION INTRAMUSCULAR; INTRAVENOUS
Status: DISCONTINUED | OUTPATIENT
Start: 2022-11-14 | End: 2022-11-14 | Stop reason: HOSPADM

## 2022-11-14 RX ORDER — MEPERIDINE HYDROCHLORIDE 25 MG/ML
12.5 INJECTION INTRAMUSCULAR; INTRAVENOUS; SUBCUTANEOUS
Status: DISCONTINUED | OUTPATIENT
Start: 2022-11-14 | End: 2022-11-14 | Stop reason: HOSPADM

## 2022-11-14 RX ORDER — SODIUM CHLORIDE 9 MG/ML
INJECTION, SOLUTION INTRAVENOUS CONTINUOUS
Status: DISCONTINUED | OUTPATIENT
Start: 2022-11-14 | End: 2022-11-14 | Stop reason: HOSPADM

## 2022-11-14 RX ORDER — LIDOCAINE HYDROCHLORIDE 20 MG/ML
INJECTION, SOLUTION EPIDURAL; INFILTRATION; INTRACAUDAL; PERINEURAL PRN
Status: DISCONTINUED | OUTPATIENT
Start: 2022-11-14 | End: 2022-11-14 | Stop reason: SDUPTHER

## 2022-11-14 RX ORDER — FENTANYL CITRATE 50 UG/ML
50 INJECTION, SOLUTION INTRAMUSCULAR; INTRAVENOUS EVERY 5 MIN PRN
Status: DISCONTINUED | OUTPATIENT
Start: 2022-11-14 | End: 2022-11-14 | Stop reason: HOSPADM

## 2022-11-14 RX ORDER — PROPOFOL 10 MG/ML
INJECTION, EMULSION INTRAVENOUS PRN
Status: DISCONTINUED | OUTPATIENT
Start: 2022-11-14 | End: 2022-11-14 | Stop reason: SDUPTHER

## 2022-11-14 RX ORDER — MAGNESIUM HYDROXIDE 1200 MG/15ML
LIQUID ORAL
Status: COMPLETED | OUTPATIENT
Start: 2022-11-14 | End: 2022-11-14

## 2022-11-14 RX ADMIN — SODIUM CHLORIDE: 9 INJECTION, SOLUTION INTRAVENOUS at 15:08

## 2022-11-14 RX ADMIN — FENTANYL CITRATE 50 MCG: 50 INJECTION INTRAMUSCULAR; INTRAVENOUS at 15:43

## 2022-11-14 RX ADMIN — PROPOFOL 100 MG: 10 INJECTION, EMULSION INTRAVENOUS at 15:43

## 2022-11-14 RX ADMIN — PROPOFOL 200 MCG/KG/MIN: 10 INJECTION, EMULSION INTRAVENOUS at 15:43

## 2022-11-14 RX ADMIN — FENTANYL CITRATE 50 MCG: 50 INJECTION INTRAMUSCULAR; INTRAVENOUS at 15:51

## 2022-11-14 RX ADMIN — MIDAZOLAM 2 MG: 1 INJECTION INTRAMUSCULAR; INTRAVENOUS at 15:38

## 2022-11-14 RX ADMIN — LIDOCAINE HYDROCHLORIDE 100 MG: 20 INJECTION, SOLUTION EPIDURAL; INFILTRATION; INTRACAUDAL; PERINEURAL at 15:43

## 2022-11-14 RX ADMIN — SODIUM CHLORIDE: 9 INJECTION, SOLUTION INTRAVENOUS at 14:53

## 2022-11-14 RX ADMIN — CEFTRIAXONE 2000 MG: 2 INJECTION, POWDER, FOR SOLUTION INTRAMUSCULAR; INTRAVENOUS at 15:43

## 2022-11-14 ASSESSMENT — LIFESTYLE VARIABLES: SMOKING_STATUS: 1

## 2022-11-14 ASSESSMENT — PAIN - FUNCTIONAL ASSESSMENT: PAIN_FUNCTIONAL_ASSESSMENT: NONE - DENIES PAIN

## 2022-11-14 ASSESSMENT — PAIN SCALES - GENERAL
PAINLEVEL_OUTOF10: 0
PAINLEVEL_OUTOF10: 0

## 2022-11-14 NOTE — BRIEF OP NOTE
Brief Postoperative Note      Patient: Alden Moritz  YOB: 1965  MRN: [id number]    Date of Procedure: 11/14/2022    Pre-Op Diagnosis: GROSS HEMATURIA    Post-Op Diagnosis: Same       Procedure(s):  CYSTOSCOPY    Surgeon(s):  Ida Diaz MD    Assistant:  * No surgical staff found *    Anesthesia: Monitor Anesthesia Care    Estimated Blood Loss (mL): Minimal    Complications: None    Specimens:   * No specimens in log *    Implants:  * No implants in log *      Drains: * No LDAs found *    Findings: normal cysto    Electronically signed by Jose Luis Oshea MD on 11/14/2022 at 4:11 PM
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
## 2022-11-14 NOTE — PROGRESS NOTES
PACU Transfer to [facility]    Vitals:    11/14/22 1615   BP: (!) 139/103   Pulse: 93   Resp: 12   Temp:    SpO2: 99%         Intake/Output Summary (Last 24 hours) at 11/14/2022 1617  Last data filed at 11/14/2022 1553  Gross per 24 hour   Intake 550 ml   Output --   Net 550 ml       Pain assessment:  none       Patient transferred to care of [facility] RN.    11/14/2022 4:17 PM

## 2022-11-14 NOTE — PROGRESS NOTES
Patient's visitor Reymundo Randall verbalized understanding of discharge instructions. They have no questions at this time.

## 2022-11-14 NOTE — PROGRESS NOTES
Patient admitted to PACU # 5 from OR at 1601 post Cystoscopy per MD Michael Navarro. Attached to PACU monitoring system and report received from anesthesia provider. Patient was reported to be hemodynamically stable during procedure. Patient drowsy on admission and denied pain.

## 2022-11-14 NOTE — H&P
Preoperative H&P Update    Marley Sullivan was seen, history and physical examination reviewed, and patient examined by me today. There have been no significant clinical changes since the completion of the previous history and physical.    The risk, benefits, and alternatives of the proposed procedure have been explained to the patient (or appropriate guardian) and understanding verbalized. All questions answered. Patient wishes to proceed.     Electronically signed by: Prince Beltrán MD,11/14/2022,4:11 PM

## 2022-11-15 NOTE — OP NOTE
[address] [place]                                 OPERATIVE REPORT    PATIENT NAME: Adriel Otto                           :        1965  MED REC NO:   [id number]                          ROOM:  ACCOUNT NO:   [de-identified]                           ADMIT DATE: 2022  PROVIDER:     Parveen Wiseman MD    DATE OF PROCEDURE:  2022    PREOPERATIVE DIAGNOSIS:  Gross hematuria. POSTOPERATIVE DIAGNOSIS:  Gross hematuria. OPERATION PERFORMED:  Cystoscopy. SURGEON:  Parveen Blakely. Sami Wiseman MD    ANESTHESIA:  MAC. FINDINGS:  No source of hematuria identified. No tumors on cystoscopy. Normal CT urogram.    DRAINS:  None. SPECIMEN:  None. EBL:  Minimal.    COMPLICATIONS:  None immediate. DISPOSITION:  Stable to Recovery. Discharged to home. Follow up as  needed. INDICATION FOR PROCEDURE:  The patient is a 51-year-old male smoker with  a family history of bladder cancer who presented with gross hematuria. He did have a urinary tract infection which was obviously also abnormal  for a male, but his hematuria persisted beyond the treatment. He is on  blood thinners with warfarin. He presents now for cystoscopy. He  underwent CT urogram which was negative. OPERATIVE PROCEDURE:  The patient was properly identified in the  preoperative area and taken to the operating room, placed on the table  in supine position. MAC anesthesia was induced, and the patient was  placed in dorsal lithotomy position. He was prepped and draped in a  standard fashion. Antibiotics were given and a time-out was performed. A rigid cystoscope was placed through the urethra and into the bladder. Anterior urethra was normal without a stricture. Prostatic urethra  showed mild lateral lobar hyperplasia without significant obstruction.    Within the bladder, the walls were smooth with no stones, tumors,  trabeculation, or inflammation. Bilateral ureteral orifices were  identified in the expected position. Most importantly, there was no  tumor noted on cystoscopy using a 30-degree and 70-degree lens. The  bladder was then drained. The scope was removed and the procedure was  ended. The patient tolerated the procedure well and was taken to  recovery room in good condition. He will be discharged to home and  follow up as needed.         Sofy Waddell MD    D: 11/14/2022 16:15:47       T: 11/14/2022 21:48:10     [email or web address]  Job#: [id number]     Doc#: [id number]    CC:

## 2022-11-16 RX ORDER — DEXTROMETHORPHAN HYDROBROMIDE AND PROMETHAZINE HYDROCHLORIDE 15; 6.25 MG/5ML; MG/5ML
5 SYRUP ORAL 4 TIMES DAILY PRN
Qty: 118 ML | Refills: 0 | Status: SHIPPED | OUTPATIENT
Start: 2022-11-16 | End: 2022-11-23

## 2022-11-21 RX ORDER — WARFARIN SODIUM 10 MG/1
5 TABLET ORAL DAILY
Qty: 90 TABLET | Refills: 0 | Status: SHIPPED | OUTPATIENT
Start: 2022-11-21

## 2022-11-22 ENCOUNTER — TELEPHONE (OUTPATIENT)
Dept: PHARMACY | Age: 57
End: 2022-11-22

## 2022-11-22 NOTE — TELEPHONE ENCOUNTER
Adriano Nation called requesting a refill on Warfarin. Returned his call to verify which pharmacy. He reports he already reached out to his physician and they sent in the prescription. Riddhi Antonio, PharmD, [address] [facility]  [phone]    For Pharmacy Admin Tracking Only    Intervention Detail:   Total # of Interventions Recommended:    Total # of Interventions Accepted:   Time Spent (min): 5 Statement Selected

## 2022-11-25 ENCOUNTER — ANTI-COAG VISIT (OUTPATIENT)
Dept: PHARMACY | Age: 57
End: 2022-11-25
Payer: COMMERCIAL

## 2022-11-25 DIAGNOSIS — Z86.718 HISTORY OF DVT (DEEP VEIN THROMBOSIS): Primary | ICD-10-CM

## 2022-11-25 LAB — INTERNATIONAL NORMALIZATION RATIO, POC: 2.1

## 2022-11-25 PROCEDURE — 99211 OFF/OP EST MAY X REQ PHY/QHP: CPT

## 2022-11-25 PROCEDURE — 85610 PROTHROMBIN TIME: CPT

## 2022-11-25 NOTE — PROGRESS NOTES
Ubaldo Pearl is a 56 y.o. here for warfarin management. Iman Wolff had an INR test today. Results were reviewed and appropriate warfarin management was completed. This visit was performed as: An in person visit. Protocols were followed with precautions to reduce the spread of COVID-19. Patient verifies current warfarin dosing regimen: Yes     Warfarin medication reviewed and updated on the patient 's home medication list: Yes   All other medications reviewed and updated on the patient 's home medication list: Yes     Lab Results   Component Value Date    INR 2.1 2022    INR 1.46 (H) 2022    INR 1.90 10/28/2022           Anticoagulation Summary  As of 2022      INR goal:  2.0-3.0   TTR:  46.4 % (6.7 mo)   INR used for dosin.1 (2022)   Warfarin maintenance plan:  10 mg (10 mg x 1) every Mon, Wed, Fri; 5 mg (10 mg x 0.5) all other days; Starting 2022   Weekly warfarin total:  50 mg   Plan last modified:  [facility] (2022)   Next INR check:  2022   Priority:  Maintenance   Target end date: Indefinite    Indications    History of DVT (deep vein thrombosis) [Z86.718]                 Anticoagulation Episode Summary       INR check location:      Preferred lab:      Send INR reminders to:  [facility] STAFF    Comments:  [facility]          Anticoagulation Care Providers       Provider Role Specialty Phone number    Real Villafana MD Referring Family Medicine [phone]            There were no vitals taken for this visit. Warfarin assessment / plan:     Appears well. No acute findings. INR within goal range. Procedure  for which he held warfarin 5 days prior. Reports he did not restart warfarin . Ran out of warfarin and [facility] did not have it. Reports he restarted warfarin . Took warfarin 10mg  and . INR is higher than expected after just two days of warfarin.      Advised to take a one time lower dose and then resume his regular weekly dose. Will see him back sooner since he has had several procedures and testing recently where he has had to hold warfarin. Also since his INR is higher today than expected. Advised to please call right away should he have any si/sx of bleeding. Description    Take Warfarin 5mg today ONLY  THEN CONTINUE: Warfarin 5mg (1/2 tablet) daily EXCEPT 10mg (1 tablet) on Monday, Wednesday and Friday    Call [phone] with signs or symptoms of bleeding or ANY medication changes (including antibiotics, steroids, over-the-counter medications or herbal supplements). If significant bleeding occurs or if you fall and hit your head, please seek immediate medical attention. Keep the number of servings of vitamin K containing foods (dark green, leafy vegetables) the same each week. Please call if this changes. Limit alcohol intake. Please call if this changes. Immunization History   Administered Date(s) Administered    COVID-19, PFIZER PURPLE top, DILUTE for use, (age 15 y+), 30mcg/0.3mL 05/07/2021, 06/04/2021    Td vaccine (adult) 07/14/2015    Tdap (Boostrix, Adacel) 01/05/2019           Orders Placed This Encounter   Procedures    POCT INR     This external order was created through the results console. No orders of the defined types were placed in this encounter. Reviewed AVS with patient / caregiver.     Billing Points:  Adjust dosage and/or reconcile meds (fill pill box) </= 5 medications - 2 points       CLINICAL PHARMACY CONSULT: MED RECONCILIATION/REVIEW ADDENDUM    For Pharmacy Admin Tracking Only    Intervention Detail: Dose Adjustment: 1, reason: Therapy Optimization  Total # of Interventions Recommended: 1  Total # of Interventions Accepted: 1  Time Spent (min): 15

## 2022-12-05 ENCOUNTER — TELEPHONE (OUTPATIENT)
Dept: FAMILY MEDICINE CLINIC | Age: 57
End: 2022-12-05

## 2022-12-05 NOTE — TELEPHONE ENCOUNTER
Pt calling. He has another sinus infection. He started with symptoms yesterday with a cough. He got a cough medicine from Dr Juarez that helps him sleep at night. He has a left over bottle of amoxicillin and he would like to know if he can take that to help.     Please Advise  Pt can be reached at [phone]

## 2022-12-05 NOTE — TELEPHONE ENCOUNTER
If his cough started yesterday, this is likely viral  I'd have him take OTC treatments  Antibiotics unlikely to be helpful  Let us know if fever, breathing changes, or worsening symptoms

## 2022-12-07 ENCOUNTER — TELEMEDICINE (OUTPATIENT)
Dept: FAMILY MEDICINE CLINIC | Age: 57
End: 2022-12-07
Payer: COMMERCIAL

## 2022-12-07 DIAGNOSIS — J06.9 URI WITH COUGH AND CONGESTION: Primary | ICD-10-CM

## 2022-12-07 PROCEDURE — 99213 OFFICE O/P EST LOW 20 MIN: CPT | Performed by: NURSE PRACTITIONER

## 2022-12-07 RX ORDER — DEXTROMETHORPHAN HYDROBROMIDE AND PROMETHAZINE HYDROCHLORIDE 15; 6.25 MG/5ML; MG/5ML
5 SYRUP ORAL 4 TIMES DAILY PRN
Qty: 140 ML | Refills: 0 | Status: SHIPPED | OUTPATIENT
Start: 2022-12-07 | End: 2022-12-14

## 2022-12-07 ASSESSMENT — ENCOUNTER SYMPTOMS
NAUSEA: 0
VOMITING: 0
COUGH: 1
RHINORRHEA: 1
SINUS PRESSURE: 1
DIARRHEA: 0
ABDOMINAL PAIN: 0
SHORTNESS OF BREATH: 0
SORE THROAT: 1

## 2022-12-07 NOTE — LETTER
[address]  Phone: [phone]  Fax: [phone]    HALEY Perry CNP        December 7, 2022     Patient: Wilberto Jenkins   YOB: 1965   Date of Visit: 12/7/2022       To Whom it May Concern:    Wilberto Jenkins was seen in my clinic on 12/7/2022. He may return to work on 12/12/22 as long as symptoms have improved. If you have any questions or concerns, please don't hesitate to call.     Sincerely,     HALEY Perry CNP

## 2022-12-07 NOTE — PROGRESS NOTES
2022    TELEHEALTH EVALUATION -- Audio/Visual (During RZJUL-61 public health emergency)    HPI:    Eilleen Cranker (:  1965) has requested an audio/video evaluation for the following concern(s):  Chief Complaint   Patient presents with    Facial Pain     X3 days. Sinus pressure. Drainage. Headache. Dry cough. Body aches. No fever. Patient reports that symptoms started on . Positive for sinus congestion, clear rhinorrhea, cough, sore throat, PND. Has tried OTC cold and cough medication without improvement. Denies fever, shortness of breath   Has been exposed to ill people at work. Denies history of asthma or COPD. Needs work excuse for today and tomorrow. Next day of work is . Review of Systems   Constitutional:  Positive for activity change and fatigue. Negative for fever. HENT:  Positive for congestion, postnasal drip, rhinorrhea, sinus pressure and sore throat. Respiratory:  Positive for cough. Negative for shortness of breath. Cardiovascular:  Negative for chest pain. Gastrointestinal:  Negative for abdominal pain, diarrhea, nausea and vomiting. Neurological:  Positive for headaches. Negative for dizziness. Prior to Visit Medications    Medication Sig Taking?  Authorizing Provider   warfarin (COUMADIN) 10 MG tablet Take 0.5 tablets by mouth daily Except 10 mg on Monday, Wednesday and Friday or as directed by [id number] [facility] Yes Suzie Juarez MD   amLODIPine (NORVASC) 5 MG tablet TAKE ONE TABLET BY MOUTH DAILY Yes Suzie Juarez MD   atorvastatin (LIPITOR) 20 MG tablet TAKE ONE TABLET BY MOUTH DAILY Yes Suzie Juarez MD   metFORMIN (GLUCOPHAGE-XR) 500 MG extended release tablet TAKE ONE TABLET BY MOUTH DAILY WITH A MEAL Yes Suzie Juarez MD   loratadine-pseudoephedrine (CLARITIN-D 12HR) 5-120 MG per extended release tablet Take 1 tablet by mouth daily as needed (allergies)  Yes Historical Provider, MD   Acetaminophen-Caffeine [facility] TENSION HEADACHE) TABS Take 2 tablets by mouth daily as needed (migraine)  Yes Historical Provider, MD   esomeprazole Magnesium (NEXIUM) 40 MG PACK Take 40 mg by mouth daily. Yes Historical Provider, MD       Social History     Tobacco Use    Smoking status: Every Day     Packs/day: 1.00     Years: 35.00     Pack years: 35.00     Types: Cigarettes    Smokeless tobacco: Never   Vaping Use    Vaping Use: Never used   Substance Use Topics    Alcohol use: Yes     Comment: social    Drug use: No        PHYSICAL EXAMINATION:  [ INSTRUCTIONS:  \"[x]\" Indicates a positive item  \"[]\" Indicates a negative item  -- DELETE ALL ITEMS NOT EXAMINED]  Patient-Reported Vitals 12/7/2022   Patient-Reported Weight 215 lbs   Patient-Reported Height 6 4        Constitutional: [x] Appears well-developed and well-nourished [x] No apparent distress      [] Abnormal-   Mental status  [x] Alert and awake  [x] Oriented to person/place/time [x]Able to follow commands      Eyes:  EOM    [x]  Normal  [] Abnormal-  Sclera  [x]  Normal  [] Abnormal -         Discharge [x]  None visible  [] Abnormal -    HENT:   [x] Normocephalic, atraumatic.   [] Abnormal   [] Mouth/Throat: Mucous membranes are moist.     External Ears [x] Normal  [] Abnormal-     Neck: [x] No visualized mass     Pulmonary/Chest: [x] Respiratory effort normal.  [x] No visualized signs of difficulty breathing or respiratory distress        [] Abnormal-      Musculoskeletal:   [] Normal gait with no signs of ataxia         [] Normal range of motion of neck        [] Abnormal-       Neurological:        [x] No Facial Asymmetry (Cranial nerve 7 motor function) (limited exam to video visit)          [x] No gaze palsy        [] Abnormal-         Skin:        [x] No significant exanthematous lesions or discoloration noted on facial skin         [] Abnormal-            Psychiatric:       [x] Normal Affect [x] No Hallucinations        [] Abnormal-     Other pertinent observable physical exam findings-     ASSESSMENT/PLAN:  1. URI with cough and congestion  Viral infection   Advised to rest and hydrate with water   Declines influenza and COVID-19 testing   Mucinex BID PRN  - promethazine-dextromethorphan (PROMETHAZINE-DM) 6.25-15 MG/5ML syrup; Take 5 mLs by mouth 4 times daily as needed for Cough  Dispense: 140 mL; Refill: 0    Patient is to call if symptoms worsen or fail to improve within the week. Work excuse provided. Return if symptoms worsen or fail to improve. Candelario Leavitt, was evaluated through a synchronous (real-time) audio-video encounter. The patient (or guardian if applicable) is aware that this is a billable service, which includes applicable co-pays. This Virtual Visit was conducted with patient's (and/or legal guardian's) consent. The visit was conducted pursuant to the emergency declaration under the [address] [place] [address], [address] [place] [address] authority and the [employer] General Act. Patient identification was verified, and a caregiver was present when appropriate. The patient was located at Other: in car located in [place] . Provider was located at [facility] (Appt Dept): [address] [place] [address]. Total time spent on this encounter: Not billed by time    --HALEY Lee CNP on 12/7/2022 at 11:24 AM    An electronic signature was used to authenticate this note.

## 2022-12-09 ENCOUNTER — ANTI-COAG VISIT (OUTPATIENT)
Dept: PHARMACY | Age: 57
End: 2022-12-09
Payer: COMMERCIAL

## 2022-12-09 DIAGNOSIS — Z86.718 HISTORY OF DVT (DEEP VEIN THROMBOSIS): Primary | ICD-10-CM

## 2022-12-09 LAB — INR BLD: 1.5

## 2022-12-09 PROCEDURE — 99211 OFF/OP EST MAY X REQ PHY/QHP: CPT

## 2022-12-09 PROCEDURE — 85610 PROTHROMBIN TIME: CPT

## 2022-12-09 NOTE — PROGRESS NOTES
Candelario Leavitt is a 56 y.o. here for warfarin management. Jim Castellon had an INR test today. Results were reviewed and appropriate warfarin management was completed. This visit was performed as: An in person visit. Protocols were followed with precautions to reduce the spread of COVID-19. Patient verifies current warfarin dosing regimen: Yes     Warfarin medication reviewed and updated on the patient 's home medication list: Yes   All other medications reviewed and updated on the patient 's home medication list: No: no changes      Lab Results   Component Value Date    INR 1.50 2022    INR 2.1 2022    INR 1.46 (H) 2022       Patient Findings       Positives:  Change in health, Extra doses    Negatives:  Signs/symptoms of bleeding, Missed doses, Change in medications, Change in diet/appetite, Bruising    Comments:  Patient reports he may have doubled a dose on Thursday, not sure if he didn't take it or not. So he just took one. Patient reports getting sinus infection . Today he took a half a tablet             Anticoagulation Summary  As of 2022      INR goal:  2.0-3.0   TTR:  44.4 % (7.1 mo)   INR used for dosin.50 (2022)   Warfarin maintenance plan:  10 mg (10 mg x 1) every Mon, Wed, Fri; 5 mg (10 mg x 0.5) all other days; Starting 2022   Weekly warfarin total:  50 mg   Plan last modified:  Irish Arellano RP (2022)   Next INR check:  2022   Priority:  Maintenance   Target end date: Indefinite    Indications    History of DVT (deep vein thrombosis) [Z86.718]                 Anticoagulation Episode Summary       INR check location:      Preferred lab:      Send INR reminders to:  [facility]    Comments:  [facility]          Anticoagulation Care Providers       Provider Role Specialty Phone number    Christiana Alexander MD Referring Family Medicine [phone]            There were no vitals taken for this visit.     Warfarin assessment / plan: Appears well  Sub-therapeutic INR        Missed dose(s) 1. Patient not sure if he missed a dose so he may have taken an extra dose yesterday, and only took a half tablet today. Recommended to take 15 mg (1.5 tablets) today only and 10 mg (1 tablet) tomorrow only. Then resume to normal dosing. Description    Take 15 mg (1.5 tablets) TODAY ONLY. Then take 10 mg (1 tablet) TOMORROW ONLY (12/10)     THEN CONTINUE: Warfarin 5mg (1/2 tablet) daily EXCEPT 10mg (1 tablet) on Monday, Wednesday and Friday    Call [phone] with signs or symptoms of bleeding or ANY medication changes (including antibiotics, steroids, over-the-counter medications or herbal supplements). If significant bleeding occurs or if you fall and hit your head, please seek immediate medical attention. Keep the number of servings of vitamin K containing foods (dark green, leafy vegetables) the same each week. Please call if this changes. Limit alcohol intake. Please call if this changes. Immunization History   Administered Date(s) Administered    COVID-19, PFIZER PURPLE top, DILUTE for use, (age 15 y+), 30mcg/0.3mL 05/07/2021, 06/04/2021    Td vaccine (adult) 07/14/2015    Tdap (Boostrix, Adacel) 01/05/2019           Orders Placed This Encounter   Procedures    Protime-INR     This external order was created through the results console. No orders of the defined types were placed in this encounter. Reviewed AVS with patient / caregiver.     Billing Points:  Adjust dosage and/or reconcile meds (fill pill box) </= 5 medications - 2 points       CLINICAL PHARMACY CONSULT: MED RECONCILIATION/REVIEW ADDENDUM    For Pharmacy Admin Tracking Only    Intervention Detail: Dose Adjustment: 1, reason: Therapy Optimization  Total # of Interventions Recommended: 1  Total # of Interventions Accepted: 1  Time Spent (min): 20

## 2022-12-27 ENCOUNTER — ANTI-COAG VISIT (OUTPATIENT)
Dept: PHARMACY | Age: 57
End: 2022-12-27
Payer: COMMERCIAL

## 2022-12-27 DIAGNOSIS — Z86.718 HISTORY OF DVT (DEEP VEIN THROMBOSIS): Primary | ICD-10-CM

## 2022-12-27 LAB — INR BLD: 3.5

## 2022-12-27 PROCEDURE — 99211 OFF/OP EST MAY X REQ PHY/QHP: CPT

## 2022-12-27 PROCEDURE — 85610 PROTHROMBIN TIME: CPT

## 2023-01-06 ENCOUNTER — ANTI-COAG VISIT (OUTPATIENT)
Dept: PHARMACY | Age: 58
End: 2023-01-06
Payer: COMMERCIAL

## 2023-01-06 DIAGNOSIS — Z86.718 HISTORY OF DVT (DEEP VEIN THROMBOSIS): Primary | ICD-10-CM

## 2023-01-06 LAB — INTERNATIONAL NORMALIZATION RATIO, POC: 2

## 2023-01-06 PROCEDURE — 99211 OFF/OP EST MAY X REQ PHY/QHP: CPT

## 2023-01-06 PROCEDURE — 85610 PROTHROMBIN TIME: CPT

## 2023-01-06 NOTE — PROGRESS NOTES
Saleem Salinas is a 56 y.o. here for warfarin management. General Buerger had an INR test today. Results were reviewed and appropriate warfarin management was completed. This visit was performed as: An in person visit. Protocols were followed with precautions to reduce the spread of COVID-19. Patient verifies current warfarin dosing regimen: Yes     Warfarin medication reviewed and updated on the patient 's home medication list: Yes   All other medications reviewed and updated on the patient 's home medication list: No: no changes     Lab Results   Component Value Date    INR 2.0 2023    INR 3.50 2022    INR 1.50 2022       Patient Findings       Negatives:  Signs/symptoms of thrombosis, Signs/symptoms of bleeding, Change in medications, Change in diet/appetite, Bruising            Anticoagulation Summary  As of 2023      INR goal:  2.0-3.0   TTR:  45.7 % (8.1 mo)   INR used for dosin.0 (2023)   Warfarin maintenance plan:  10 mg (10 mg x 1) every Mon, Wed, Fri; 5 mg (10 mg x 0.5) all other days; Starting 2023   Weekly warfarin total:  50 mg   Plan last modified:  Irish Arellano [facility] (2022)   Next INR check:  2/3/2023   Priority:  Maintenance   Target end date: Indefinite    Indications    History of DVT (deep vein thrombosis) [Z86.718]                 Anticoagulation Episode Summary       INR check location:      Preferred lab:      Send INR reminders to:  [facility]AFF    Comments:  [facility]          Anticoagulation Care Providers       Provider Role Specialty Phone number    Jo Vaughan MD Referring Family Medicine [phone]            There were no vitals taken for this visit. Warfarin assessment / plan:     Appears well. No changes affecting warfarin therapy were noted. No acute findings. INR within goal range. No change to warfarin dosing today. Last INR likely elevated due to increased alcohol intake and antibiotic use. However. Antibiotic use was a low interaction w warfarin. INR back in range today. Description    Warfarin 5mg (1/2 tablet) daily EXCEPT 10mg (1 tablet) on Monday, Wednesday and Friday    Call [phone] with signs or symptoms of bleeding or ANY medication changes (including antibiotics, steroids, over-the-counter medications or herbal supplements). If significant bleeding occurs or if you fall and hit your head, please seek immediate medical attention. Keep the number of servings of vitamin K containing foods (dark green, leafy vegetables) the same each week. Please call if this changes. Limit alcohol intake. Please call if this changes. Immunization History   Administered Date(s) Administered    COVID-19, PFIZER PURPLE top, DILUTE for use, (age 15 y+), 30mcg/0.3mL 05/07/2021, 06/04/2021    Td vaccine (adult) 07/14/2015    Tdap (Boostrix, Adacel) 01/05/2019           Orders Placed This Encounter   Procedures    POCT INR     This external order was created through the results console. No orders of the defined types were placed in this encounter. Reviewed AVS with patient / caregiver.     Billing Points:  0 billing points this visit       CLINICAL PHARMACY CONSULT: MED RECONCILIATION/REVIEW ADDENDUM    For Pharmacy Admin Tracking Only    Intervention Detail:   Total # of Interventions Recommended: 0  Total # of Interventions Accepted: 0  Time Spent (min): 15

## 2023-01-16 ENCOUNTER — TELEPHONE (OUTPATIENT)
Dept: FAMILY MEDICINE CLINIC | Age: 58
End: 2023-01-16

## 2023-01-16 RX ORDER — PRAVASTATIN SODIUM 20 MG
20 TABLET ORAL DAILY
Qty: 90 TABLET | Refills: 1 | Status: SHIPPED | OUTPATIENT
Start: 2023-01-16 | End: 2023-01-16 | Stop reason: SDUPTHER

## 2023-01-16 RX ORDER — PRAVASTATIN SODIUM 20 MG
20 TABLET ORAL DAILY
Qty: 90 TABLET | Refills: 0 | Status: SHIPPED | OUTPATIENT
Start: 2023-01-16

## 2023-01-16 RX ORDER — PRAVASTATIN SODIUM 20 MG
20 TABLET ORAL DAILY
Qty: 90 TABLET | Refills: 1 | Status: CANCELLED | OUTPATIENT
Start: 2023-01-16

## 2023-01-16 NOTE — TELEPHONE ENCOUNTER
Patient called in stating that he is taking ATORVASTATIN for his BP and he feels like it's causing him body aches so he wants to switch to Karsten Montoya which was recommended by his sister who was a nurse    Please send to [employer] on [address] [facility] [phone]    Please Advise

## 2023-01-16 NOTE — TELEPHONE ENCOUNTER
Switched to pravastatin which should be easier on the side effects  If not improving could switch to [employer]

## 2023-02-03 ENCOUNTER — ANTI-COAG VISIT (OUTPATIENT)
Dept: PHARMACY | Age: 58
End: 2023-02-03
Payer: COMMERCIAL

## 2023-02-03 DIAGNOSIS — Z86.718 HISTORY OF DVT (DEEP VEIN THROMBOSIS): Primary | ICD-10-CM

## 2023-02-03 LAB — INTERNATIONAL NORMALIZATION RATIO, POC: 2.7

## 2023-02-03 PROCEDURE — 99211 OFF/OP EST MAY X REQ PHY/QHP: CPT | Performed by: SPEECH-LANGUAGE PATHOLOGIST

## 2023-02-03 PROCEDURE — 85610 PROTHROMBIN TIME: CPT | Performed by: SPEECH-LANGUAGE PATHOLOGIST

## 2023-02-03 NOTE — PROGRESS NOTES
Dilcia Jc is a 56 y.o. here for warfarin management. Prosper Hwoard had an INR test today. Results were reviewed and appropriate warfarin management was completed. This visit was performed as: An in person visit. Protocols were followed with precautions to reduce the spread of COVID-19. Patient verifies current warfarin dosing regimen: Yes     Warfarin medication reviewed and updated on the patient 's home medication list: Yes   All other medications reviewed and updated on the patient 's home medication list: Yes     Lab Results   Component Value Date    INR 2.7 2023    INR 2.0 2023    INR 3.50 2022           Anticoagulation Summary  As of 2/3/2023      INR goal:  2.0-3.0   TTR:  51.4 % (9 mo)   INR used for dosin.7 (2/3/2023)   Warfarin maintenance plan:  10 mg (10 mg x 1) every Mon, Wed, Fri; 5 mg (10 mg x 0.5) all other days; Starting 2/3/2023   Weekly warfarin total:  50 mg   Plan last modified:  Irish Arellano [facility] (2022)   Next INR check:  3/3/2023   Priority:  Maintenance   Target end date: Indefinite    Indications    History of DVT (deep vein thrombosis) [Z86.718]                 Anticoagulation Episode Summary       INR check location:      Preferred lab:      Send INR reminders to:  [facility]AFF    Comments:  [facility]          Anticoagulation Care Providers       Provider Role Specialty Phone number    Artie Dailey MD Referring Family Medicine [phone]            There were no vitals taken for this visit. Warfarin assessment / plan:     Appears well. No changes affecting warfarin therapy were noted. No acute findings. INR within goal range. No change to warfarin dosing today. Patient states he occasionally takes Excedrin and will hold his warfarin dose when he takes it. He thinks he may have done this up to two times in the last week or so (these were on 5 mg dose days).   Discussed with him about finding an alternative that does not contain aspirin and he is working on that. Patient also holds his warfarin dose if he has more than three alcoholic beverages (this is usually on a Friday if he does). Patient states he has not been eating as many vitamin K containing foods as usual, but does plan to get back to his normal intake of these foods and to stay more consistent. Patient states he does not have any signs or symptoms of bleeding. Discussed what to do if he has them, and advised him to call with any medication changes. Will have patient continue same regimen and follow up in 4 weeks. Description    Warfarin 5mg (1/2 tablet) daily EXCEPT 10mg (1 tablet) on Monday, Wednesday and Friday    Call [phone] with signs or symptoms of bleeding or ANY medication changes (including antibiotics, steroids, over-the-counter medications or herbal supplements). If significant bleeding occurs or if you fall and hit your head, please seek immediate medical attention. Keep the number of servings of vitamin K containing foods (dark green, leafy vegetables) the same each week. Please call if this changes. Limit alcohol intake. Please call if this changes. Immunization History   Administered Date(s) Administered    COVID-19, PFIZER PURPLE top, DILUTE for use, (age 15 y+), 30mcg/0.3mL 2021, 2021    Td vaccine (adult) 2015    Tdap (Boostrix, Adacel) 2019           Orders Placed This Encounter   Procedures    POCT INR     This external order was created through the results console. No orders of the defined types were placed in this encounter. Reviewed AVS with patient / caregiver.     Billing Points:  Basic Education (disease state, med overview, expected symptoms) - 1 point      For Pharmacy Admin Tracking Only    Intervention Detail: Adherence Monitorin  Total # of Interventions Recommended: 1  Total # of Interventions Accepted: 1  Time Spent (min): 15    Nhung Mathis, JessicaD, [facility]  2/3/2023  9:25 AM

## 2023-03-03 ENCOUNTER — ANTI-COAG VISIT (OUTPATIENT)
Dept: PHARMACY | Age: 58
End: 2023-03-03
Payer: COMMERCIAL

## 2023-03-03 DIAGNOSIS — Z86.718 HISTORY OF DVT (DEEP VEIN THROMBOSIS): Primary | ICD-10-CM

## 2023-03-03 LAB — INTERNATIONAL NORMALIZATION RATIO, POC: 2.3

## 2023-03-03 PROCEDURE — 99211 OFF/OP EST MAY X REQ PHY/QHP: CPT

## 2023-03-03 PROCEDURE — 85610 PROTHROMBIN TIME: CPT

## 2023-03-03 NOTE — PROGRESS NOTES
Zaida Mcguire is a 56 y.o. here for warfarin management. Evelyn Magallon had an INR test today. Results were reviewed and appropriate warfarin management was completed. This visit was performed as: An in person visit. Protocols were followed with precautions to reduce the spread of COVID-19. Patient verifies current warfarin dosing regimen: Yes     Warfarin medication reviewed and updated on the patient 's home medication list: Yes   All other medications reviewed and updated on the patient 's home medication list: Yes     Lab Results   Component Value Date    INR 2.3 2023    INR 2.7 2023    INR 2.0 2023       Patient Findings       Positives:  Change in medications    Negatives:  Signs/symptoms of bleeding, Missed doses, Change in diet/appetite, Bruising    Comments:  Meloxicam daily for shoulder injury for 30 days started 3/2/23            Anticoagulation Summary  As of 3/3/2023      INR goal:  2.0-3.0   TTR:  55.9 % (9.9 mo)   INR used for dosin.3 (3/3/2023)   Warfarin maintenance plan:  10 mg (10 mg x 1) every Mon, Wed, Fri; 5 mg (10 mg x 0.5) all other days; Starting 3/3/2023   Weekly warfarin total:  50 mg   Plan last modified:  Martín Gonsalez [facility] (2022)   Next INR check:  3/31/2023   Priority:  Maintenance   Target end date: Indefinite    Indications    History of DVT (deep vein thrombosis) [Z86.718]                 Anticoagulation Episode Summary       INR check location:      Preferred lab:      Send INR reminders to:  [facility] STAFF    Comments:  [facility]          Anticoagulation Care Providers       Provider Role Specialty Phone number    Carlotta Andrew MD Referring Family Medicine [phone]            There were no vitals taken for this visit. Warfarin assessment / plan:     Appears well. No acute findings. INR within goal range. No change to warfarin dosing today. Started Meloxicam which may increase the INR and increases the risk of bleeding. He will watch for signs or symptoms of bleeding and call right away should this occur. I do not expect his INR to go above goal.     Description    Warfarin 5mg (1/2 tablet) daily EXCEPT 10mg (1 tablet) on Monday, Wednesday and Friday    Call right away with any signs or symptoms of bleeding especially while taking Meloxicam. Meloxicam 3/2/23 for 30 days    Call [phone] with signs or symptoms of bleeding or ANY medication changes (including antibiotics, steroids, over-the-counter medications or herbal supplements). If significant bleeding occurs or if you fall and hit your head, please seek immediate medical attention. Keep the number of servings of vitamin K containing foods (dark green, leafy vegetables) the same each week. Please call if this changes. Limit alcohol intake. Please call if this changes. Immunization History   Administered Date(s) Administered    COVID-19, PFIZER PURPLE top, DILUTE for use, (age 15 y+), 30mcg/0.3mL 2021, 2021    Td vaccine (adult) 2015    Tdap (Boostrix, Adacel) 2019           Orders Placed This Encounter   Procedures    POCT INR     This external order was created through the results console. No orders of the defined types were placed in this encounter. Reviewed AVS with patient / caregiver.     Billing Points:  0 billing points this visit     For Pharmacy Admin Tracking Only    Intervention Detail: Adherence Monitorin  Total # of Interventions Recommended: 1  Total # of Interventions Accepted: 1  Time Spent (min): 15

## 2023-03-10 RX ORDER — DEXTROMETHORPHAN HYDROBROMIDE AND PROMETHAZINE HYDROCHLORIDE 15; 6.25 MG/5ML; MG/5ML
5 SYRUP ORAL 4 TIMES DAILY PRN
Qty: 118 ML | Refills: 0 | OUTPATIENT
Start: 2023-03-10 | End: 2023-03-17

## 2023-03-13 ENCOUNTER — TELEPHONE (OUTPATIENT)
Dept: FAMILY MEDICINE CLINIC | Age: 58
End: 2023-03-13

## 2023-03-13 NOTE — TELEPHONE ENCOUNTER
Called in for refill on cough med  Has had cough, drainage, sinus symptoms for 5 days.   Would like to do VV tmrw if possible

## 2023-03-13 NOTE — TELEPHONE ENCOUNTER
Agree with needing eval for new symptoms  Ok to schedule with someone virtually if needed (I don't have availability tomorrow)

## 2023-03-14 ENCOUNTER — TELEMEDICINE (OUTPATIENT)
Dept: FAMILY MEDICINE CLINIC | Age: 58
End: 2023-03-14
Payer: COMMERCIAL

## 2023-03-14 DIAGNOSIS — J06.9 URI WITH COUGH AND CONGESTION: ICD-10-CM

## 2023-03-14 PROCEDURE — 99213 OFFICE O/P EST LOW 20 MIN: CPT | Performed by: NURSE PRACTITIONER

## 2023-03-14 RX ORDER — DEXTROMETHORPHAN HYDROBROMIDE AND PROMETHAZINE HYDROCHLORIDE 15; 6.25 MG/5ML; MG/5ML
5 SYRUP ORAL 4 TIMES DAILY PRN
Qty: 140 ML | Refills: 0 | Status: SHIPPED | OUTPATIENT
Start: 2023-03-14 | End: 2023-03-21

## 2023-03-14 SDOH — ECONOMIC STABILITY: INCOME INSECURITY: HOW HARD IS IT FOR YOU TO PAY FOR THE VERY BASICS LIKE FOOD, HOUSING, MEDICAL CARE, AND HEATING?: NOT HARD AT ALL

## 2023-03-14 SDOH — ECONOMIC STABILITY: FOOD INSECURITY: WITHIN THE PAST 12 MONTHS, YOU WORRIED THAT YOUR FOOD WOULD RUN OUT BEFORE YOU GOT MONEY TO BUY MORE.: NEVER TRUE

## 2023-03-14 SDOH — ECONOMIC STABILITY: HOUSING INSECURITY
IN THE LAST 12 MONTHS, WAS THERE A TIME WHEN YOU DID NOT HAVE A STEADY PLACE TO SLEEP OR SLEPT IN A SHELTER (INCLUDING NOW)?: NO

## 2023-03-14 SDOH — ECONOMIC STABILITY: FOOD INSECURITY: WITHIN THE PAST 12 MONTHS, THE FOOD YOU BOUGHT JUST DIDN'T LAST AND YOU DIDN'T HAVE MONEY TO GET MORE.: NEVER TRUE

## 2023-03-14 ASSESSMENT — PATIENT HEALTH QUESTIONNAIRE - PHQ9
SUM OF ALL RESPONSES TO PHQ9 QUESTIONS 1 & 2: 0
SUM OF ALL RESPONSES TO PHQ QUESTIONS 1-9: 0
2. FEELING DOWN, DEPRESSED OR HOPELESS: 0
SUM OF ALL RESPONSES TO PHQ QUESTIONS 1-9: 0
SUM OF ALL RESPONSES TO PHQ QUESTIONS 1-9: 0
1. LITTLE INTEREST OR PLEASURE IN DOING THINGS: 0
SUM OF ALL RESPONSES TO PHQ QUESTIONS 1-9: 0

## 2023-03-14 ASSESSMENT — ENCOUNTER SYMPTOMS
COUGH: 1
SORE THROAT: 0
SHORTNESS OF BREATH: 0
ABDOMINAL PAIN: 0
RHINORRHEA: 1

## 2023-03-14 NOTE — PROGRESS NOTES
3/14/2023    TELEHEALTH EVALUATION -- Audio/Visual (During CFDGX-99 public health emergency)    HPI:    Rachana Dodson (:  1965) has requested an audio/video evaluation for the following concern(s):  Chief Complaint   Patient presents with    Sinus Problem     Started about a week ago. Cough. Sinus. Drainage. Headache. Patient reports that symptoms started about a week ago.   +sinus congestion, sinus pressure, PND, cough. Sinus drainage is clear. Denies sore throat or shortness of breath. Denies fever  Has tried claritin-D without improvement in symptoms. Would like promethazine-DM refilled as he has taken that in the past and it has helped with his cough. Review of Systems   Constitutional:  Negative for activity change and fever. HENT:  Positive for congestion, postnasal drip and rhinorrhea. Negative for sore throat. Respiratory:  Positive for cough. Negative for shortness of breath. Cardiovascular:  Negative for chest pain. Gastrointestinal:  Negative for abdominal pain. Neurological:  Negative for dizziness and headaches. Prior to Visit Medications    Medication Sig Taking?  Authorizing Provider   pravastatin (PRAVACHOL) 20 MG tablet Take 1 tablet by mouth daily Yes Enedina Mcginnis MD   warfarin (COUMADIN) 10 MG tablet Take 0.5 tablets by mouth daily Except 10 mg on Monday, Wednesday and Friday or as directed by [employer] Yes Sivan Flatness MD Ann   amLODIPine (NORVASC) 5 MG tablet TAKE ONE TABLET BY MOUTH DAILY Yes Sivan Flatness MD Ann   metFORMIN (GLUCOPHAGE-XR) 500 MG extended release tablet TAKE ONE TABLET BY MOUTH DAILY WITH A MEAL Yes Sivan Juarez MD   loratadine-pseudoephedrine (CLARITIN-D 12HR) 5-120 MG per extended release tablet Take 1 tablet by mouth daily as needed (allergies)  Yes Historical Provider, MD   Acetaminophen-Caffeine (EXCEDRIN TENSION HEADACHE) TABS Take 2 tablets by mouth daily as needed (migraine)  Yes Historical Provider, MD   esomeprazole Magnesium (NEXIUM) 40 MG PACK Take 40 mg by mouth daily. Yes Historical Provider, MD       Social History     Tobacco Use    Smoking status: Every Day     Packs/day: 1.00     Years: 35.00     Pack years: 35.00     Types: Cigarettes    Smokeless tobacco: Never   Vaping Use    Vaping Use: Never used   Substance Use Topics    Alcohol use: Yes     Comment: social    Drug use: No        PHYSICAL EXAMINATION:  [ INSTRUCTIONS:  \"[x]\" Indicates a positive item  \"[]\" Indicates a negative item  -- DELETE ALL ITEMS NOT EXAMINED]      Constitutional: [x] Appears well-developed and well-nourished [x] No apparent distress      [] Abnormal-   Mental status  [x] Alert and awake  [x] Oriented to person/place/time [x]Able to follow commands      Eyes:  EOM    [x]  Normal  [] Abnormal-  Sclera  [x]  Normal  [] Abnormal -         Discharge [x]  None visible  [] Abnormal -    HENT:   [x] Normocephalic, atraumatic. [] Abnormal   [] Mouth/Throat: Mucous membranes are moist.     External Ears [x] Normal  [] Abnormal-     Neck: [x] No visualized mass     Pulmonary/Chest: [x] Respiratory effort normal.  [x] No visualized signs of difficulty breathing or respiratory distress        [] Abnormal-      Musculoskeletal:   [] Normal gait with no signs of ataxia         [] Normal range of motion of neck        [] Abnormal-       Neurological:        [x] No Facial Asymmetry (Cranial nerve 7 motor function) (limited exam to video visit)          [x] No gaze palsy        [] Abnormal-         Skin:        [x] No significant exanthematous lesions or discoloration noted on facial skin         [] Abnormal-            Psychiatric:       [x] Normal Affect [x] No Hallucinations        [] Abnormal-     Other pertinent observable physical exam findings-     ASSESSMENT/PLAN:  1. URI with cough and congestion  Suspect viral infection. Advised to rest and hydrate with water.    Declines COVID-19 testing.   - promethazine-dextromethorphan (PROMETHAZINE-DM) 6.25-15 MG/5ML syrup; Take 5 mLs by mouth 4 times daily as needed for Cough  Dispense: 140 mL; Refill: 0  - Mucinex BID PRN    Discussed the importance of smoking cessation. Patient is to call if symptoms worsen or fail to improve within the week. Return if symptoms worsen or fail to improve. Candelario Brine, was evaluated through a synchronous (real-time) audio-video encounter. The patient (or guardian if applicable) is aware that this is a billable service, which includes applicable co-pays. This Virtual Visit was conducted with patient's (and/or legal guardian's) consent. The visit was conducted pursuant to the emergency declaration under the [address] [place] [address] and the [employer] [place] General Act. Patient identification was verified, and a caregiver was present when appropriate. The patient was located at Other: work located in [place]  Provider was located at [facility] (Appt Dept): P.ORupesh Otto 245,  Josiah Garg 429    Total time spent on this encounter: Not billed by time    --HALEY Lee CNP on 3/14/2023 at 1:38 PM    An electronic signature was used to authenticate this note.

## 2023-03-21 ENCOUNTER — OFFICE VISIT (OUTPATIENT)
Dept: FAMILY MEDICINE CLINIC | Age: 58
End: 2023-03-21
Payer: COMMERCIAL

## 2023-03-21 VITALS
RESPIRATION RATE: 16 BRPM | DIASTOLIC BLOOD PRESSURE: 86 MMHG | OXYGEN SATURATION: 96 % | TEMPERATURE: 98.1 F | HEART RATE: 88 BPM | SYSTOLIC BLOOD PRESSURE: 134 MMHG

## 2023-03-21 DIAGNOSIS — I10 ESSENTIAL HYPERTENSION: ICD-10-CM

## 2023-03-21 DIAGNOSIS — E78.2 MIXED HYPERLIPIDEMIA: ICD-10-CM

## 2023-03-21 DIAGNOSIS — B96.89 ACUTE BACTERIAL SINUSITIS: Primary | ICD-10-CM

## 2023-03-21 DIAGNOSIS — J01.90 ACUTE BACTERIAL SINUSITIS: Primary | ICD-10-CM

## 2023-03-21 PROCEDURE — 99213 OFFICE O/P EST LOW 20 MIN: CPT | Performed by: NURSE PRACTITIONER

## 2023-03-21 PROCEDURE — 3079F DIAST BP 80-89 MM HG: CPT | Performed by: NURSE PRACTITIONER

## 2023-03-21 PROCEDURE — 3075F SYST BP GE 130 - 139MM HG: CPT | Performed by: NURSE PRACTITIONER

## 2023-03-21 RX ORDER — PRAVASTATIN SODIUM 20 MG
20 TABLET ORAL
Qty: 90 TABLET | Refills: 0 | Status: SHIPPED | OUTPATIENT
Start: 2023-03-21

## 2023-03-21 RX ORDER — AMLODIPINE BESYLATE 5 MG/1
TABLET ORAL
Qty: 90 TABLET | Refills: 1 | Status: SHIPPED | OUTPATIENT
Start: 2023-03-21

## 2023-03-21 RX ORDER — DOXYCYCLINE HYCLATE 100 MG
100 TABLET ORAL 2 TIMES DAILY
Qty: 20 TABLET | Refills: 0 | Status: SHIPPED | OUTPATIENT
Start: 2023-03-21 | End: 2023-03-31

## 2023-03-21 ASSESSMENT — ENCOUNTER SYMPTOMS
SHORTNESS OF BREATH: 0
RHINORRHEA: 1
SINUS PRESSURE: 1
ABDOMINAL PAIN: 0
SORE THROAT: 1
NAUSEA: 0
SINUS COMPLAINT: 1
COUGH: 1
VOMITING: 0

## 2023-03-21 NOTE — PROGRESS NOTES
daily as needed (migraine)       esomeprazole Magnesium (NEXIUM) 40 MG PACK Take 40 mg by mouth daily. No current facility-administered medications for this visit. No Known Allergies    Review of Systems   Constitutional:  Positive for activity change and fatigue. Negative for fever. HENT:  Positive for congestion, postnasal drip, rhinorrhea, sinus pressure and sore throat. Respiratory:  Positive for cough. Negative for shortness of breath. Cardiovascular:  Negative for chest pain. Gastrointestinal:  Negative for abdominal pain, nausea and vomiting. Neurological:  Negative for dizziness and headaches. Vitals:    03/21/23 1329   BP: 134/86   Site: Right Upper Arm   Position: Sitting   Cuff Size: Large Adult   Pulse: 88   Resp: 16   Temp: 98.1 °F (36.7 °C)   TempSrc: Oral   SpO2: 96%       There is no height or weight on file to calculate BMI. Wt Readings from Last 3 Encounters:   11/14/22 215 lb (97.5 kg)   09/06/22 212 lb 6.4 oz (96.3 kg)   09/02/22 212 lb (96.2 kg)       BP Readings from Last 3 Encounters:   03/21/23 134/86   11/14/22 (!) 148/89   09/06/22 132/78       Physical Exam  Vitals and nursing note reviewed. Constitutional:       General: He is not in acute distress. Appearance: He is well-developed. HENT:      Head: Normocephalic and atraumatic. Right Ear: Tympanic membrane, ear canal and external ear normal. Tympanic membrane is not erythematous or bulging. Left Ear: Tympanic membrane, ear canal and external ear normal. Tympanic membrane is not erythematous or bulging. Nose:      Right Sinus: Maxillary sinus tenderness present. No frontal sinus tenderness. Left Sinus: Maxillary sinus tenderness present. No frontal sinus tenderness. Mouth/Throat:      Pharynx: Uvula midline. Posterior oropharyngeal erythema present. No oropharyngeal exudate. Cardiovascular:      Rate and Rhythm: Normal rate and regular rhythm.       Heart sounds: Normal

## 2023-03-31 ENCOUNTER — ANTI-COAG VISIT (OUTPATIENT)
Dept: PHARMACY | Age: 58
End: 2023-03-31
Payer: COMMERCIAL

## 2023-03-31 DIAGNOSIS — Z86.718 HISTORY OF DVT (DEEP VEIN THROMBOSIS): Primary | ICD-10-CM

## 2023-03-31 LAB — INR BLD: 2.2

## 2023-03-31 PROCEDURE — 99211 OFF/OP EST MAY X REQ PHY/QHP: CPT

## 2023-03-31 PROCEDURE — 85610 PROTHROMBIN TIME: CPT

## 2023-03-31 NOTE — PROGRESS NOTES
Eri Lowry is a 56 y.o. here for warfarin management. Jesus Neighbors had an INR test today. Results were reviewed and appropriate warfarin management was completed. This visit was performed as: An in person visit. Protocols were followed with precautions to reduce the spread of COVID-19. Patient verifies current warfarin dosing regimen: Yes     Warfarin medication reviewed and updated on the patient 's home medication list: Yes   All other medications reviewed and updated on the patient 's home medication list: Yes - Finished Doxycycline    Lab Results   Component Value Date    INR 2.20 2023    INR 2.3 2023    INR 2.7 2023       Patient Findings       Positives:  Change in health, Change in medications    Negatives:  Signs/symptoms of bleeding, Change in alcohol use, Missed doses, Extra doses, Change in diet/appetite, Bruising    Comments:  Doxycycline last dose yesterday - 2 weeks supply for sinus infection             Anticoagulation Summary  As of 3/31/2023      INR goal:  2.0-3.0   TTR:  59.7 % (10.9 mo)   INR used for dosin.20 (3/31/2023)   Warfarin maintenance plan:  10 mg (10 mg x 1) every Mon, Wed, Fri; 5 mg (10 mg x 0.5) all other days; Starting 3/31/2023   Weekly warfarin total:  50 mg   Plan last modified:  Irish Arellano RPH (2022)   Next INR check:  2023   Priority:  Maintenance   Target end date: Indefinite    Indications    History of DVT (deep vein thrombosis) [Z86.718]                 Anticoagulation Episode Summary       INR check location:      Preferred lab:      Send INR reminders to:  [facility]    Comments:  [facility]          Anticoagulation Care Providers       Provider Role Specialty Phone number    Kayce Ziegler MD Referring Family Medicine [phone]            There were no vitals taken for this visit. Warfarin assessment / plan:     Appears well. No changes affecting warfarin therapy were noted. No acute findings.   INR

## 2023-04-19 ENCOUNTER — TELEPHONE (OUTPATIENT)
Dept: PHARMACY | Age: 58
End: 2023-04-19

## 2023-04-19 RX ORDER — WARFARIN SODIUM 10 MG/1
TABLET ORAL
Qty: 65 TABLET | Refills: 2 | Status: SHIPPED | OUTPATIENT
Start: 2023-04-19

## 2023-04-19 NOTE — TELEPHONE ENCOUNTER
Mr. Zahra Sorenson called requesting a refill for his warfarin.  The following was sent to his pharmacy:    E-scribed RX to [facility] phone [phone]  Coumadin 10mg tablets  #65 tablets, UD, 2RF  Physician : Arnoldo Pandya [address]. [address] [facility]  Anticoagulation Service  [phone]

## 2023-04-28 ENCOUNTER — TELEPHONE (OUTPATIENT)
Dept: PHARMACY | Age: 58
End: 2023-04-28

## 2023-04-28 NOTE — TELEPHONE ENCOUNTER
Esthela Rivers called and canceled. He will call back to reschedule. Ruth Oconnell, PharmD, [address] [facility]  [phone]    For Pharmacy Admin Tracking Only    Intervention Detail:   Total # of Interventions Recommended:    Total # of Interventions Accepted:   Time Spent (min): 5

## 2023-04-29 ENCOUNTER — APPOINTMENT (OUTPATIENT)
Dept: CT IMAGING | Age: 58
DRG: 872 | End: 2023-04-29
Payer: COMMERCIAL

## 2023-04-29 ENCOUNTER — HOSPITAL ENCOUNTER (INPATIENT)
Age: 58
LOS: 3 days | Discharge: HOME | DRG: 872 | End: 2023-05-02
Attending: INTERNAL MEDICINE | Admitting: INTERNAL MEDICINE
Payer: COMMERCIAL

## 2023-04-29 DIAGNOSIS — N12 PYELONEPHRITIS: Primary | ICD-10-CM

## 2023-04-29 DIAGNOSIS — R00.0 TACHYCARDIA: ICD-10-CM

## 2023-04-29 DIAGNOSIS — I10 ESSENTIAL HYPERTENSION: ICD-10-CM

## 2023-04-29 DIAGNOSIS — R10.9 RIGHT FLANK PAIN: ICD-10-CM

## 2023-04-29 PROBLEM — N30.00 ACUTE CYSTITIS WITHOUT HEMATURIA: Status: ACTIVE | Noted: 2023-04-29

## 2023-04-29 PROBLEM — E87.1 HYPONATREMIA: Status: ACTIVE | Noted: 2023-04-29

## 2023-04-29 LAB
ALBUMIN SERPL-MCNC: 4.1 G/DL (ref 3.4–5)
ALBUMIN/GLOB SERPL: 1.4 {RATIO} (ref 1.1–2.2)
ALP SERPL-CCNC: 77 U/L (ref 40–129)
ALT SERPL-CCNC: 15 U/L (ref 10–40)
ANION GAP SERPL CALCULATED.3IONS-SCNC: 13 MMOL/L (ref 3–16)
AST SERPL-CCNC: 16 U/L (ref 15–37)
BACTERIA URNS QL MICRO: ABNORMAL /HPF
BASOPHILS # BLD: 0.1 K/UL (ref 0–0.2)
BASOPHILS NFR BLD: 1 %
BILIRUB SERPL-MCNC: 0.4 MG/DL (ref 0–1)
BILIRUB UR QL STRIP.AUTO: NEGATIVE
BUN SERPL-MCNC: 10 MG/DL (ref 7–20)
CALCIUM SERPL-MCNC: 9.3 MG/DL (ref 8.3–10.6)
CHLORIDE SERPL-SCNC: 95 MMOL/L (ref 99–110)
CLARITY UR: ABNORMAL
CO2 SERPL-SCNC: 23 MMOL/L (ref 21–32)
COLOR UR: YELLOW
CREAT SERPL-MCNC: 0.9 MG/DL (ref 0.9–1.3)
DEPRECATED RDW RBC AUTO: 13 % (ref 12.4–15.4)
EOSINOPHIL # BLD: 0 K/UL (ref 0–0.6)
EOSINOPHIL NFR BLD: 0.3 %
EPI CELLS #/AREA URNS AUTO: 0 /HPF (ref 0–5)
GFR SERPLBLD CREATININE-BSD FMLA CKD-EPI: >60 ML/MIN/{1.73_M2}
GLUCOSE BLD-MCNC: 263 MG/DL (ref 70–99)
GLUCOSE SERPL-MCNC: 229 MG/DL (ref 70–99)
GLUCOSE UR STRIP.AUTO-MCNC: 500 MG/DL
HCT VFR BLD AUTO: 40.3 % (ref 40.5–52.5)
HGB BLD-MCNC: 13.8 G/DL (ref 13.5–17.5)
HGB UR QL STRIP.AUTO: ABNORMAL
HYALINE CASTS #/AREA URNS AUTO: 2 /LPF (ref 0–8)
INR PPP: 1.8 (ref 0.84–1.16)
KETONES UR STRIP.AUTO-MCNC: NEGATIVE MG/DL
LACTATE BLDV-SCNC: 1.3 MMOL/L (ref 0.4–1.9)
LEUKOCYTE ESTERASE UR QL STRIP.AUTO: ABNORMAL
LYMPHOCYTES # BLD: 1.1 K/UL (ref 1–5.1)
LYMPHOCYTES NFR BLD: 9.5 %
MCH RBC QN AUTO: 32 PG (ref 26–34)
MCHC RBC AUTO-ENTMCNC: 34.3 G/DL (ref 31–36)
MCV RBC AUTO: 93.4 FL (ref 80–100)
MONOCYTES # BLD: 1.3 K/UL (ref 0–1.3)
MONOCYTES NFR BLD: 11.3 %
NEUTROPHILS # BLD: 9.1 K/UL (ref 1.7–7.7)
NEUTROPHILS NFR BLD: 77.9 %
NITRITE UR QL STRIP.AUTO: NEGATIVE
PERFORMED ON: ABNORMAL
PH UR STRIP.AUTO: 6 [PH] (ref 5–8)
PLATELET # BLD AUTO: 164 K/UL (ref 135–450)
PMV BLD AUTO: 8.9 FL (ref 5–10.5)
POTASSIUM SERPL-SCNC: 4.1 MMOL/L (ref 3.5–5.1)
PROT SERPL-MCNC: 7.1 G/DL (ref 6.4–8.2)
PROT UR STRIP.AUTO-MCNC: 100 MG/DL
PROTHROMBIN TIME: 20.8 SEC (ref 11.5–14.8)
RBC # BLD AUTO: 4.32 M/UL (ref 4.2–5.9)
RBC CLUMPS #/AREA URNS AUTO: 23 /HPF (ref 0–4)
SODIUM SERPL-SCNC: 131 MMOL/L (ref 136–145)
SP GR UR STRIP.AUTO: 1.01 (ref 1–1.03)
UA COMPLETE W REFLEX CULTURE PNL UR: YES
UA DIPSTICK W REFLEX MICRO PNL UR: YES
URN SPEC COLLECT METH UR: ABNORMAL
UROBILINOGEN UR STRIP-ACNC: 0.2 E.U./DL
WBC # BLD AUTO: 11.7 K/UL (ref 4–11)
WBC #/AREA URNS AUTO: 253 /HPF (ref 0–5)

## 2023-04-29 PROCEDURE — 6370000000 HC RX 637 (ALT 250 FOR IP): Performed by: INTERNAL MEDICINE

## 2023-04-29 PROCEDURE — 74176 CT ABD & PELVIS W/O CONTRAST: CPT

## 2023-04-29 PROCEDURE — 96374 THER/PROPH/DIAG INJ IV PUSH: CPT

## 2023-04-29 PROCEDURE — 96375 TX/PRO/DX INJ NEW DRUG ADDON: CPT

## 2023-04-29 PROCEDURE — 99285 EMERGENCY DEPT VISIT HI MDM: CPT

## 2023-04-29 PROCEDURE — 87088 URINE BACTERIA CULTURE: CPT

## 2023-04-29 PROCEDURE — 6360000002 HC RX W HCPCS: Performed by: INTERNAL MEDICINE

## 2023-04-29 PROCEDURE — 83605 ASSAY OF LACTIC ACID: CPT

## 2023-04-29 PROCEDURE — 85610 PROTHROMBIN TIME: CPT

## 2023-04-29 PROCEDURE — 87040 BLOOD CULTURE FOR BACTERIA: CPT

## 2023-04-29 PROCEDURE — 85025 COMPLETE CBC W/AUTO DIFF WBC: CPT

## 2023-04-29 PROCEDURE — 2580000003 HC RX 258: Performed by: PHYSICIAN ASSISTANT

## 2023-04-29 PROCEDURE — 6360000002 HC RX W HCPCS: Performed by: PHYSICIAN ASSISTANT

## 2023-04-29 PROCEDURE — 36415 COLL VENOUS BLD VENIPUNCTURE: CPT

## 2023-04-29 PROCEDURE — 87086 URINE CULTURE/COLONY COUNT: CPT

## 2023-04-29 PROCEDURE — 87150 DNA/RNA AMPLIFIED PROBE: CPT

## 2023-04-29 PROCEDURE — 81001 URINALYSIS AUTO W/SCOPE: CPT

## 2023-04-29 PROCEDURE — 87186 SC STD MICRODIL/AGAR DIL: CPT

## 2023-04-29 PROCEDURE — 80053 COMPREHEN METABOLIC PANEL: CPT

## 2023-04-29 PROCEDURE — 1200000000 HC SEMI PRIVATE

## 2023-04-29 PROCEDURE — 2580000003 HC RX 258: Performed by: INTERNAL MEDICINE

## 2023-04-29 RX ORDER — KETOROLAC TROMETHAMINE 30 MG/ML
30 INJECTION, SOLUTION INTRAMUSCULAR; INTRAVENOUS EVERY 6 HOURS PRN
Status: DISCONTINUED | OUTPATIENT
Start: 2023-04-29 | End: 2023-05-02 | Stop reason: HOSPADM

## 2023-04-29 RX ORDER — INSULIN LISPRO 100 [IU]/ML
0-4 INJECTION, SOLUTION INTRAVENOUS; SUBCUTANEOUS NIGHTLY
Status: DISCONTINUED | OUTPATIENT
Start: 2023-04-30 | End: 2023-05-02 | Stop reason: HOSPADM

## 2023-04-29 RX ORDER — AMLODIPINE BESYLATE 5 MG/1
5 TABLET ORAL DAILY
Status: DISCONTINUED | OUTPATIENT
Start: 2023-04-30 | End: 2023-05-01

## 2023-04-29 RX ORDER — SODIUM CHLORIDE 0.9 % (FLUSH) 0.9 %
10 SYRINGE (ML) INJECTION PRN
Status: DISCONTINUED | OUTPATIENT
Start: 2023-04-29 | End: 2023-05-02 | Stop reason: HOSPADM

## 2023-04-29 RX ORDER — DEXTROSE MONOHYDRATE 100 MG/ML
INJECTION, SOLUTION INTRAVENOUS CONTINUOUS PRN
Status: DISCONTINUED | OUTPATIENT
Start: 2023-04-29 | End: 2023-05-02 | Stop reason: HOSPADM

## 2023-04-29 RX ORDER — ONDANSETRON 2 MG/ML
4 INJECTION INTRAMUSCULAR; INTRAVENOUS ONCE
Status: COMPLETED | OUTPATIENT
Start: 2023-04-29 | End: 2023-04-29

## 2023-04-29 RX ORDER — PRAVASTATIN SODIUM 20 MG
20 TABLET ORAL NIGHTLY
Status: DISCONTINUED | OUTPATIENT
Start: 2023-04-29 | End: 2023-05-02 | Stop reason: HOSPADM

## 2023-04-29 RX ORDER — INSULIN LISPRO 100 [IU]/ML
0-8 INJECTION, SOLUTION INTRAVENOUS; SUBCUTANEOUS
Status: DISCONTINUED | OUTPATIENT
Start: 2023-04-30 | End: 2023-05-02 | Stop reason: HOSPADM

## 2023-04-29 RX ORDER — SODIUM CHLORIDE 9 MG/ML
INJECTION, SOLUTION INTRAVENOUS PRN
Status: DISCONTINUED | OUTPATIENT
Start: 2023-04-29 | End: 2023-05-02 | Stop reason: HOSPADM

## 2023-04-29 RX ORDER — SODIUM CHLORIDE 0.9 % (FLUSH) 0.9 %
10 SYRINGE (ML) INJECTION EVERY 12 HOURS SCHEDULED
Status: DISCONTINUED | OUTPATIENT
Start: 2023-04-29 | End: 2023-05-02 | Stop reason: HOSPADM

## 2023-04-29 RX ORDER — POLYETHYLENE GLYCOL 3350 17 G/17G
17 POWDER, FOR SOLUTION ORAL DAILY PRN
Status: DISCONTINUED | OUTPATIENT
Start: 2023-04-29 | End: 2023-05-02 | Stop reason: HOSPADM

## 2023-04-29 RX ORDER — ACETAMINOPHEN, ASPIRIN AND CAFFEINE 250; 250; 65 MG/1; MG/1; MG/1
2 TABLET, FILM COATED ORAL DAILY PRN
Status: DISCONTINUED | OUTPATIENT
Start: 2023-04-29 | End: 2023-05-02 | Stop reason: HOSPADM

## 2023-04-29 RX ORDER — ENOXAPARIN SODIUM 100 MG/ML
40 INJECTION SUBCUTANEOUS DAILY
Status: DISCONTINUED | OUTPATIENT
Start: 2023-04-30 | End: 2023-04-29 | Stop reason: ALTCHOICE

## 2023-04-29 RX ORDER — SODIUM CHLORIDE 9 MG/ML
INJECTION, SOLUTION INTRAVENOUS CONTINUOUS
Status: DISCONTINUED | OUTPATIENT
Start: 2023-04-30 | End: 2023-05-02 | Stop reason: HOSPADM

## 2023-04-29 RX ORDER — ACETAMINOPHEN 325 MG/1
650 TABLET ORAL EVERY 4 HOURS PRN
Status: DISCONTINUED | OUTPATIENT
Start: 2023-04-29 | End: 2023-05-02 | Stop reason: HOSPADM

## 2023-04-29 RX ORDER — PANTOPRAZOLE SODIUM 40 MG/1
40 TABLET, DELAYED RELEASE ORAL
Status: DISCONTINUED | OUTPATIENT
Start: 2023-04-30 | End: 2023-05-02 | Stop reason: HOSPADM

## 2023-04-29 RX ORDER — ACETAMINOPHEN 650 MG/1
650 SUPPOSITORY RECTAL EVERY 4 HOURS PRN
Status: DISCONTINUED | OUTPATIENT
Start: 2023-04-29 | End: 2023-05-02 | Stop reason: HOSPADM

## 2023-04-29 RX ORDER — ONDANSETRON 2 MG/ML
4 INJECTION INTRAMUSCULAR; INTRAVENOUS EVERY 4 HOURS PRN
Status: DISCONTINUED | OUTPATIENT
Start: 2023-04-29 | End: 2023-05-02 | Stop reason: HOSPADM

## 2023-04-29 RX ORDER — 0.9 % SODIUM CHLORIDE 0.9 %
1000 INTRAVENOUS SOLUTION INTRAVENOUS ONCE
Status: COMPLETED | OUTPATIENT
Start: 2023-04-29 | End: 2023-04-29

## 2023-04-29 RX ORDER — ESOMEPRAZOLE MAGNESIUM 40 MG/1
40 FOR SUSPENSION ORAL DAILY
Status: DISCONTINUED | OUTPATIENT
Start: 2023-04-30 | End: 2023-04-29 | Stop reason: CLARIF

## 2023-04-29 RX ADMIN — PRAVASTATIN SODIUM 20 MG: 20 TABLET ORAL at 23:50

## 2023-04-29 RX ADMIN — CEFTRIAXONE 1000 MG: 1 INJECTION, POWDER, FOR SOLUTION INTRAMUSCULAR; INTRAVENOUS at 23:55

## 2023-04-29 RX ADMIN — ONDANSETRON 4 MG: 2 INJECTION INTRAMUSCULAR; INTRAVENOUS at 20:20

## 2023-04-29 RX ADMIN — SODIUM CHLORIDE 1000 ML: 9 INJECTION, SOLUTION INTRAVENOUS at 20:21

## 2023-04-29 RX ADMIN — Medication 10 ML: at 23:55

## 2023-04-29 RX ADMIN — SODIUM CHLORIDE: 9 INJECTION, SOLUTION INTRAVENOUS at 23:54

## 2023-04-29 RX ADMIN — KETOROLAC TROMETHAMINE 30 MG: 30 INJECTION, SOLUTION INTRAMUSCULAR at 23:49

## 2023-04-29 RX ADMIN — HYDROMORPHONE HYDROCHLORIDE 0.5 MG: 1 INJECTION, SOLUTION INTRAMUSCULAR; INTRAVENOUS; SUBCUTANEOUS at 20:20

## 2023-04-29 SDOH — ECONOMIC STABILITY: TRANSPORTATION INSECURITY
IN THE PAST 12 MONTHS, HAS THE LACK OF TRANSPORTATION KEPT YOU FROM MEDICAL APPOINTMENTS OR FROM GETTING MEDICATIONS?: NO

## 2023-04-29 SDOH — ECONOMIC STABILITY: TRANSPORTATION INSECURITY
IN THE PAST 12 MONTHS, HAS LACK OF TRANSPORTATION KEPT YOU FROM MEETINGS, WORK, OR FROM GETTING THINGS NEEDED FOR DAILY LIVING?: NO

## 2023-04-29 ASSESSMENT — ENCOUNTER SYMPTOMS
VOMITING: 0
COLOR CHANGE: 0
ABDOMINAL PAIN: 1
SHORTNESS OF BREATH: 0
COUGH: 0
NAUSEA: 0

## 2023-04-29 ASSESSMENT — PAIN DESCRIPTION - ORIENTATION
ORIENTATION: RIGHT

## 2023-04-29 ASSESSMENT — PAIN DESCRIPTION - PAIN TYPE: TYPE: ACUTE PAIN

## 2023-04-29 ASSESSMENT — PAIN - FUNCTIONAL ASSESSMENT
PAIN_FUNCTIONAL_ASSESSMENT: 0-10
PAIN_FUNCTIONAL_ASSESSMENT: ACTIVITIES ARE NOT PREVENTED

## 2023-04-29 ASSESSMENT — PAIN SCALES - GENERAL
PAINLEVEL_OUTOF10: 8
PAINLEVEL_OUTOF10: 9
PAINLEVEL_OUTOF10: 8

## 2023-04-29 ASSESSMENT — PAIN DESCRIPTION - FREQUENCY: FREQUENCY: CONTINUOUS

## 2023-04-29 ASSESSMENT — PAIN DESCRIPTION - ONSET: ONSET: ON-GOING

## 2023-04-29 ASSESSMENT — PAIN DESCRIPTION - LOCATION
LOCATION: FLANK

## 2023-04-29 ASSESSMENT — SOCIAL DETERMINANTS OF HEALTH (SDOH): HOW HARD IS IT FOR YOU TO PAY FOR THE VERY BASICS LIKE FOOD, HOUSING, MEDICAL CARE, AND HEATING?: NOT HARD AT ALL

## 2023-04-29 ASSESSMENT — PAIN DESCRIPTION - DESCRIPTORS
DESCRIPTORS: ACHING
DESCRIPTORS: SHARP;THROBBING
DESCRIPTORS: ACHING

## 2023-04-30 LAB
ANION GAP SERPL CALCULATED.3IONS-SCNC: 10 MMOL/L (ref 3–16)
BASOPHILS # BLD: 0 K/UL (ref 0–0.2)
BASOPHILS NFR BLD: 0.4 %
BUN SERPL-MCNC: 13 MG/DL (ref 7–20)
CALCIUM SERPL-MCNC: 8.4 MG/DL (ref 8.3–10.6)
CHLORIDE SERPL-SCNC: 102 MMOL/L (ref 99–110)
CO2 SERPL-SCNC: 24 MMOL/L (ref 21–32)
CREAT SERPL-MCNC: 0.8 MG/DL (ref 0.9–1.3)
DEPRECATED RDW RBC AUTO: 12.7 % (ref 12.4–15.4)
EOSINOPHIL # BLD: 0.1 K/UL (ref 0–0.6)
EOSINOPHIL NFR BLD: 0.9 %
GFR SERPLBLD CREATININE-BSD FMLA CKD-EPI: >60 ML/MIN/{1.73_M2}
GLUCOSE BLD-MCNC: 175 MG/DL (ref 70–99)
GLUCOSE BLD-MCNC: 194 MG/DL (ref 70–99)
GLUCOSE BLD-MCNC: 203 MG/DL (ref 70–99)
GLUCOSE BLD-MCNC: 213 MG/DL (ref 70–99)
GLUCOSE SERPL-MCNC: 180 MG/DL (ref 70–99)
HCT VFR BLD AUTO: 37.1 % (ref 40.5–52.5)
HGB BLD-MCNC: 12.7 G/DL (ref 13.5–17.5)
INR PPP: 1.86 (ref 0.84–1.16)
LYMPHOCYTES # BLD: 1.1 K/UL (ref 1–5.1)
LYMPHOCYTES NFR BLD: 9.1 %
MCH RBC QN AUTO: 32.3 PG (ref 26–34)
MCHC RBC AUTO-ENTMCNC: 34.2 G/DL (ref 31–36)
MCV RBC AUTO: 94.4 FL (ref 80–100)
MONOCYTES # BLD: 1.6 K/UL (ref 0–1.3)
MONOCYTES NFR BLD: 13.2 %
NEUTROPHILS # BLD: 9.2 K/UL (ref 1.7–7.7)
NEUTROPHILS NFR BLD: 76.4 %
PATH INTERP BLD-IMP: NO
PERFORMED ON: ABNORMAL
PLATELET # BLD AUTO: 160 K/UL (ref 135–450)
PMV BLD AUTO: 9.4 FL (ref 5–10.5)
POTASSIUM SERPL-SCNC: 3.9 MMOL/L (ref 3.5–5.1)
PROTHROMBIN TIME: 21.3 SEC (ref 11.5–14.8)
RBC # BLD AUTO: 3.93 M/UL (ref 4.2–5.9)
REPORT: NORMAL
SLIDE REVIEW: ABNORMAL
SODIUM SERPL-SCNC: 136 MMOL/L (ref 136–145)
WBC # BLD AUTO: 12.1 K/UL (ref 4–11)

## 2023-04-30 PROCEDURE — 2580000003 HC RX 258: Performed by: INTERNAL MEDICINE

## 2023-04-30 PROCEDURE — 85025 COMPLETE CBC W/AUTO DIFF WBC: CPT

## 2023-04-30 PROCEDURE — 1200000000 HC SEMI PRIVATE

## 2023-04-30 PROCEDURE — 80048 BASIC METABOLIC PNL TOTAL CA: CPT

## 2023-04-30 PROCEDURE — 85610 PROTHROMBIN TIME: CPT

## 2023-04-30 PROCEDURE — 6360000002 HC RX W HCPCS: Performed by: INTERNAL MEDICINE

## 2023-04-30 PROCEDURE — 6370000000 HC RX 637 (ALT 250 FOR IP): Performed by: INTERNAL MEDICINE

## 2023-04-30 PROCEDURE — 36415 COLL VENOUS BLD VENIPUNCTURE: CPT

## 2023-04-30 RX ORDER — WARFARIN SODIUM 7.5 MG/1
7.5 TABLET ORAL
Status: COMPLETED | OUTPATIENT
Start: 2023-04-30 | End: 2023-04-30

## 2023-04-30 RX ORDER — MELOXICAM 15 MG/1
15 TABLET ORAL DAILY PRN
COMMUNITY
Start: 2023-03-02

## 2023-04-30 RX ADMIN — SODIUM CHLORIDE: 9 INJECTION, SOLUTION INTRAVENOUS at 20:37

## 2023-04-30 RX ADMIN — AMLODIPINE BESYLATE 5 MG: 5 TABLET ORAL at 08:47

## 2023-04-30 RX ADMIN — Medication 10 ML: at 20:39

## 2023-04-30 RX ADMIN — PRAVASTATIN SODIUM 20 MG: 20 TABLET ORAL at 20:39

## 2023-04-30 RX ADMIN — KETOROLAC TROMETHAMINE 30 MG: 30 INJECTION, SOLUTION INTRAMUSCULAR at 14:07

## 2023-04-30 RX ADMIN — ACETAMINOPHEN, ASPIRIN, CAFFEINE 2 TABLET: 250; 65; 250 TABLET, FILM COATED ORAL at 05:53

## 2023-04-30 RX ADMIN — WARFARIN SODIUM 7.5 MG: 7.5 TABLET ORAL at 18:48

## 2023-04-30 RX ADMIN — HYDROMORPHONE HYDROCHLORIDE 0.5 MG: 1 INJECTION, SOLUTION INTRAMUSCULAR; INTRAVENOUS; SUBCUTANEOUS at 08:52

## 2023-04-30 RX ADMIN — ACETAMINOPHEN 650 MG: 325 TABLET ORAL at 14:08

## 2023-04-30 RX ADMIN — PANTOPRAZOLE SODIUM 40 MG: 40 TABLET, DELAYED RELEASE ORAL at 05:53

## 2023-04-30 RX ADMIN — SODIUM CHLORIDE: 9 INJECTION, SOLUTION INTRAVENOUS at 10:29

## 2023-04-30 RX ADMIN — KETOROLAC TROMETHAMINE 30 MG: 30 INJECTION, SOLUTION INTRAMUSCULAR at 20:39

## 2023-04-30 RX ADMIN — CEFTRIAXONE 1000 MG: 1 INJECTION, POWDER, FOR SOLUTION INTRAMUSCULAR; INTRAVENOUS at 20:38

## 2023-04-30 RX ADMIN — HYDROMORPHONE HYDROCHLORIDE 0.5 MG: 1 INJECTION, SOLUTION INTRAMUSCULAR; INTRAVENOUS; SUBCUTANEOUS at 00:58

## 2023-04-30 SDOH — ECONOMIC STABILITY: FOOD INSECURITY: WITHIN THE PAST 12 MONTHS, YOU WORRIED THAT YOUR FOOD WOULD RUN OUT BEFORE YOU GOT MONEY TO BUY MORE.: NEVER TRUE

## 2023-04-30 SDOH — HEALTH STABILITY: PHYSICAL HEALTH: ON AVERAGE, HOW MANY MINUTES DO YOU ENGAGE IN EXERCISE AT THIS LEVEL?: 0 MIN

## 2023-04-30 SDOH — ECONOMIC STABILITY: FOOD INSECURITY: WITHIN THE PAST 12 MONTHS, THE FOOD YOU BOUGHT JUST DIDN'T LAST AND YOU DIDN'T HAVE MONEY TO GET MORE.: NEVER TRUE

## 2023-04-30 SDOH — HEALTH STABILITY: PHYSICAL HEALTH: ON AVERAGE, HOW MANY DAYS PER WEEK DO YOU ENGAGE IN MODERATE TO STRENUOUS EXERCISE (LIKE A BRISK WALK)?: 0 DAYS

## 2023-04-30 SDOH — ECONOMIC STABILITY: INCOME INSECURITY: IN THE LAST 12 MONTHS, WAS THERE A TIME WHEN YOU WERE NOT ABLE TO PAY THE MORTGAGE OR RENT ON TIME?: NO

## 2023-04-30 ASSESSMENT — SOCIAL DETERMINANTS OF HEALTH (SDOH)
HOW OFTEN DO YOU GET TOGETHER WITH FRIENDS OR RELATIVES?: MORE THAN THREE TIMES A WEEK
ARE YOU MARRIED, WIDOWED, DIVORCED, SEPARATED, NEVER MARRIED, OR LIVING WITH A PARTNER?: NEVER MARRIED
IN A TYPICAL WEEK, HOW MANY TIMES DO YOU TALK ON THE PHONE WITH FAMILY, FRIENDS, OR NEIGHBORS?: MORE THAN THREE TIMES A WEEK
HOW OFTEN DO YOU ATTENT MEETINGS OF THE CLUB OR ORGANIZATION YOU BELONG TO?: NEVER
WITHIN THE LAST YEAR, HAVE YOU BEEN HUMILIATED OR EMOTIONALLY ABUSED IN OTHER WAYS BY YOUR PARTNER OR EX-PARTNER?: NO
WITHIN THE LAST YEAR, HAVE YOU BEEN KICKED, HIT, SLAPPED, OR OTHERWISE PHYSICALLY HURT BY YOUR PARTNER OR EX-PARTNER?: NO
DO YOU BELONG TO ANY CLUBS OR ORGANIZATIONS SUCH AS CHURCH GROUPS UNIONS, FRATERNAL OR ATHLETIC GROUPS, OR SCHOOL GROUPS?: NO
HOW OFTEN DO YOU ATTEND CHURCH OR RELIGIOUS SERVICES?: NEVER
WITHIN THE LAST YEAR, HAVE YOU BEEN AFRAID OF YOUR PARTNER OR EX-PARTNER?: NO
WITHIN THE LAST YEAR, HAVE TO BEEN RAPED OR FORCED TO HAVE ANY KIND OF SEXUAL ACTIVITY BY YOUR PARTNER OR EX-PARTNER?: NO

## 2023-04-30 ASSESSMENT — PAIN DESCRIPTION - ONSET
ONSET: ON-GOING

## 2023-04-30 ASSESSMENT — PAIN DESCRIPTION - ORIENTATION
ORIENTATION: RIGHT;LEFT
ORIENTATION: RIGHT
ORIENTATION: RIGHT;LEFT

## 2023-04-30 ASSESSMENT — PAIN SCALES - GENERAL
PAINLEVEL_OUTOF10: 7
PAINLEVEL_OUTOF10: 8
PAINLEVEL_OUTOF10: 2
PAINLEVEL_OUTOF10: 3
PAINLEVEL_OUTOF10: 7

## 2023-04-30 ASSESSMENT — PAIN DESCRIPTION - LOCATION
LOCATION: FLANK
LOCATION: BACK
LOCATION: HEAD;BACK
LOCATION: FLANK
LOCATION: HEAD

## 2023-04-30 ASSESSMENT — PAIN - FUNCTIONAL ASSESSMENT
PAIN_FUNCTIONAL_ASSESSMENT: ACTIVITIES ARE NOT PREVENTED

## 2023-04-30 ASSESSMENT — PAIN DESCRIPTION - PAIN TYPE
TYPE: ACUTE PAIN

## 2023-04-30 ASSESSMENT — PAIN DESCRIPTION - DESCRIPTORS
DESCRIPTORS: ACHING
DESCRIPTORS: THROBBING
DESCRIPTORS: SHARP;THROBBING
DESCRIPTORS: ACHING
DESCRIPTORS: THROBBING

## 2023-04-30 ASSESSMENT — PAIN DESCRIPTION - FREQUENCY
FREQUENCY: INTERMITTENT
FREQUENCY: INTERMITTENT
FREQUENCY: CONTINUOUS
FREQUENCY: INTERMITTENT
FREQUENCY: CONTINUOUS

## 2023-04-30 ASSESSMENT — LIFESTYLE VARIABLES
HOW MANY STANDARD DRINKS CONTAINING ALCOHOL DO YOU HAVE ON A TYPICAL DAY: 1 OR 2
HOW OFTEN DO YOU HAVE A DRINK CONTAINING ALCOHOL: 2-4 TIMES A MONTH

## 2023-05-01 LAB
ANION GAP SERPL CALCULATED.3IONS-SCNC: 9 MMOL/L (ref 3–16)
BASOPHILS # BLD: 0 K/UL (ref 0–0.2)
BASOPHILS NFR BLD: 0.4 %
BUN SERPL-MCNC: 9 MG/DL (ref 7–20)
CALCIUM SERPL-MCNC: 8.7 MG/DL (ref 8.3–10.6)
CHLORIDE SERPL-SCNC: 101 MMOL/L (ref 99–110)
CO2 SERPL-SCNC: 25 MMOL/L (ref 21–32)
CREAT SERPL-MCNC: 0.8 MG/DL (ref 0.9–1.3)
DEPRECATED RDW RBC AUTO: 12.8 % (ref 12.4–15.4)
EOSINOPHIL # BLD: 0.1 K/UL (ref 0–0.6)
EOSINOPHIL NFR BLD: 1.2 %
GFR SERPLBLD CREATININE-BSD FMLA CKD-EPI: >60 ML/MIN/{1.73_M2}
GLUCOSE BLD-MCNC: 170 MG/DL (ref 70–99)
GLUCOSE BLD-MCNC: 179 MG/DL (ref 70–99)
GLUCOSE BLD-MCNC: 182 MG/DL (ref 70–99)
GLUCOSE BLD-MCNC: 203 MG/DL (ref 70–99)
GLUCOSE SERPL-MCNC: 161 MG/DL (ref 70–99)
HCT VFR BLD AUTO: 38.3 % (ref 40.5–52.5)
HGB BLD-MCNC: 12.8 G/DL (ref 13.5–17.5)
INR PPP: 1.74 (ref 0.84–1.16)
LYMPHOCYTES # BLD: 1.1 K/UL (ref 1–5.1)
LYMPHOCYTES NFR BLD: 10.4 %
MCH RBC QN AUTO: 31.6 PG (ref 26–34)
MCHC RBC AUTO-ENTMCNC: 33.3 G/DL (ref 31–36)
MCV RBC AUTO: 94.7 FL (ref 80–100)
MONOCYTES # BLD: 1.2 K/UL (ref 0–1.3)
MONOCYTES NFR BLD: 11.3 %
NEUTROPHILS # BLD: 8.2 K/UL (ref 1.7–7.7)
NEUTROPHILS NFR BLD: 76.7 %
PERFORMED ON: ABNORMAL
PLATELET # BLD AUTO: 185 K/UL (ref 135–450)
PMV BLD AUTO: 9 FL (ref 5–10.5)
POTASSIUM SERPL-SCNC: 3.9 MMOL/L (ref 3.5–5.1)
PROTHROMBIN TIME: 20.3 SEC (ref 11.5–14.8)
RBC # BLD AUTO: 4.04 M/UL (ref 4.2–5.9)
SODIUM SERPL-SCNC: 135 MMOL/L (ref 136–145)
WBC # BLD AUTO: 10.6 K/UL (ref 4–11)

## 2023-05-01 PROCEDURE — 36415 COLL VENOUS BLD VENIPUNCTURE: CPT

## 2023-05-01 PROCEDURE — 1200000000 HC SEMI PRIVATE

## 2023-05-01 PROCEDURE — 6370000000 HC RX 637 (ALT 250 FOR IP): Performed by: NURSE PRACTITIONER

## 2023-05-01 PROCEDURE — 80048 BASIC METABOLIC PNL TOTAL CA: CPT

## 2023-05-01 PROCEDURE — 87040 BLOOD CULTURE FOR BACTERIA: CPT

## 2023-05-01 PROCEDURE — 2580000003 HC RX 258: Performed by: NURSE PRACTITIONER

## 2023-05-01 PROCEDURE — 6370000000 HC RX 637 (ALT 250 FOR IP): Performed by: INTERNAL MEDICINE

## 2023-05-01 PROCEDURE — 6360000002 HC RX W HCPCS: Performed by: INTERNAL MEDICINE

## 2023-05-01 PROCEDURE — 6360000002 HC RX W HCPCS: Performed by: NURSE PRACTITIONER

## 2023-05-01 PROCEDURE — 85025 COMPLETE CBC W/AUTO DIFF WBC: CPT

## 2023-05-01 PROCEDURE — 2580000003 HC RX 258: Performed by: INTERNAL MEDICINE

## 2023-05-01 PROCEDURE — 85610 PROTHROMBIN TIME: CPT

## 2023-05-01 RX ORDER — ENOXAPARIN SODIUM 100 MG/ML
40 INJECTION SUBCUTANEOUS DAILY
Status: DISCONTINUED | OUTPATIENT
Start: 2023-05-01 | End: 2023-05-02 | Stop reason: ALTCHOICE

## 2023-05-01 RX ORDER — AMLODIPINE BESYLATE 10 MG/1
10 TABLET ORAL DAILY
Status: DISCONTINUED | OUTPATIENT
Start: 2023-05-02 | End: 2023-05-02 | Stop reason: HOSPADM

## 2023-05-01 RX ORDER — VANCOMYCIN 1.75 G/350ML
1250 INJECTION, SOLUTION INTRAVENOUS EVERY 12 HOURS
Status: DISCONTINUED | OUTPATIENT
Start: 2023-05-01 | End: 2023-05-02

## 2023-05-01 RX ORDER — WARFARIN SODIUM 5 MG/1
10 TABLET ORAL
Status: COMPLETED | OUTPATIENT
Start: 2023-05-01 | End: 2023-05-01

## 2023-05-01 RX ADMIN — WARFARIN SODIUM 10 MG: 5 TABLET ORAL at 18:22

## 2023-05-01 RX ADMIN — PANTOPRAZOLE SODIUM 40 MG: 40 TABLET, DELAYED RELEASE ORAL at 05:16

## 2023-05-01 RX ADMIN — SODIUM CHLORIDE: 9 INJECTION, SOLUTION INTRAVENOUS at 15:53

## 2023-05-01 RX ADMIN — CEFTRIAXONE 1000 MG: 1 INJECTION, POWDER, FOR SOLUTION INTRAMUSCULAR; INTRAVENOUS at 21:04

## 2023-05-01 RX ADMIN — Medication 10 ML: at 21:02

## 2023-05-01 RX ADMIN — VANCOMYCIN 1250 MG: 1.75 INJECTION, SOLUTION INTRAVENOUS at 21:42

## 2023-05-01 RX ADMIN — ACETAMINOPHEN 650 MG: 325 TABLET ORAL at 14:36

## 2023-05-01 RX ADMIN — SODIUM CHLORIDE: 9 INJECTION, SOLUTION INTRAVENOUS at 05:11

## 2023-05-01 RX ADMIN — KETOROLAC TROMETHAMINE 30 MG: 30 INJECTION, SOLUTION INTRAMUSCULAR at 21:01

## 2023-05-01 RX ADMIN — Medication 10 ML: at 09:48

## 2023-05-01 RX ADMIN — PRAVASTATIN SODIUM 20 MG: 20 TABLET ORAL at 21:01

## 2023-05-01 RX ADMIN — KETOROLAC TROMETHAMINE 30 MG: 30 INJECTION, SOLUTION INTRAMUSCULAR at 05:16

## 2023-05-01 RX ADMIN — AMLODIPINE BESYLATE 5 MG: 5 TABLET ORAL at 09:43

## 2023-05-01 RX ADMIN — VANCOMYCIN 1250 MG: 1.75 INJECTION, SOLUTION INTRAVENOUS at 09:43

## 2023-05-01 RX ADMIN — ACETAMINOPHEN, ASPIRIN, CAFFEINE 2 TABLET: 250; 65; 250 TABLET, FILM COATED ORAL at 15:53

## 2023-05-01 RX ADMIN — ENOXAPARIN SODIUM 40 MG: 100 INJECTION SUBCUTANEOUS at 14:41

## 2023-05-01 ASSESSMENT — PAIN SCALES - GENERAL
PAINLEVEL_OUTOF10: 7
PAINLEVEL_OUTOF10: 8
PAINLEVEL_OUTOF10: 3
PAINLEVEL_OUTOF10: 7
PAINLEVEL_OUTOF10: 10

## 2023-05-01 ASSESSMENT — PAIN - FUNCTIONAL ASSESSMENT
PAIN_FUNCTIONAL_ASSESSMENT: ACTIVITIES ARE NOT PREVENTED

## 2023-05-01 ASSESSMENT — PAIN DESCRIPTION - DESCRIPTORS
DESCRIPTORS: SHARP
DESCRIPTORS: SHARP
DESCRIPTORS: ACHING;DULL

## 2023-05-01 ASSESSMENT — PAIN DESCRIPTION - LOCATION
LOCATION: FLANK
LOCATION: FLANK
LOCATION: ABDOMEN

## 2023-05-01 ASSESSMENT — PAIN DESCRIPTION - ONSET
ONSET: ON-GOING

## 2023-05-01 ASSESSMENT — PAIN DESCRIPTION - ORIENTATION
ORIENTATION: RIGHT

## 2023-05-01 ASSESSMENT — PAIN DESCRIPTION - FREQUENCY
FREQUENCY: INTERMITTENT

## 2023-05-01 ASSESSMENT — PAIN DESCRIPTION - PAIN TYPE
TYPE: ACUTE PAIN

## 2023-05-01 NOTE — PROGRESS NOTES
Hospitalist Progress Note      Name:  Franki Frost /Age/Sex: 1965  (56 y.o. male)   MRN & CSN:  [id number] & [id number] Admission Date/Time: 2023  5:35 PM   Location:  [id number] PCP: Lillie Sams MD         Hospital Day: 3                                               Attending Physician Dr Nany Hartleyties and Plan:   Franki Frost is a 56 y.o.  male  who presents with Pyelonephritis of right kidney    Present on Admission:   Pyelonephritis of right kidney   Right flank pain   Acute cystitis without hematuria   Sinus tachycardia   Hyponatremia   Essential hypertension   Mixed hyperlipidemia   History of DVT (deep vein thrombosis)   Current use of anticoagulant therapy   Varicose veins of leg with swelling   Varicose veins of lower extremity with pain   Chronic venous hypertension with inflammation involving left side   Gastroesophageal reflux disease without esophagitis     Abdominal pain-right flank  Pyelonephritis of right kidney suspected  Acute cystitis without hematuria  SIRS   Criteria met: Leukocytosis, tachycardic. Low-grade fever on admission    CT (2023) no evidence of an acute intra-abdominal or intrapelvic process, punctate nonobstructing calculi bilateral kidneys: No ureteral calculus or hydronephrosis   UA somewhat suggestive of infection  Urine culture Ecoli growth sensitivity report pending   Continue Rocephin days 3    Bacteremia- staph species 1/4 bottles- possible contaminate    Initiated Vancomycin given result and clinical picture-febrile episode/tachycardia this morning    Will deescalate with final results     Hyponatremia-resolved. Hyperglycemia DMII with chronic complications and without long term use of insulin.    Monitor FSBS and cover with medium dose SSI   Hold PO medications while inpatient   No recent A1c per chart review    Coagulopathy  History of DVT  Long-term anticoagulation  Subtherapeutic INR   Continue Coumadin   Lovenox bridging

## 2023-05-01 NOTE — PLAN OF CARE
Problem: Discharge Planning  Goal: Discharge to home or other facility with appropriate resources  5/1/2023 1131 by Daljit Horn RN  Outcome: Progressing  4/30/2023 2314 by Don Shafer RN  Outcome: Progressing  Flowsheets (Taken 4/30/2023 2040)  Discharge to home or other facility with appropriate resources:   Identify barriers to discharge with patient and caregiver   Arrange for needed discharge resources and transportation as appropriate   Identify discharge learning needs (meds, wound care, etc)     Problem: Pain  Goal: Verbalizes/displays adequate comfort level or baseline comfort level  5/1/2023 1131 by Daljit Horn RN  Outcome: Progressing  Flowsheets (Taken 4/30/2023 2315 by Don Shafer RN)  Verbalizes/displays adequate comfort level or baseline comfort level:   Encourage patient to monitor pain and request assistance   Assess pain using appropriate pain scale   Administer analgesics based on type and severity of pain and evaluate response   Implement non-pharmacological measures as appropriate and evaluate response   Consider cultural and social influences on pain and pain management   Notify Licensed Independent Practitioner if interventions unsuccessful or patient reports new pain  4/30/2023 2314 by Don Shafer RN  Outcome: Progressing     Problem: Safety - Adult  Goal: Free from fall injury  5/1/2023 1131 by Dalijt Horn RN  Outcome: Progressing  4/30/2023 2314 by Don Shafer RN  Outcome: Progressing     Problem: Metabolic/Fluid and Electrolytes - Adult  Goal: Electrolytes maintained within normal limits  5/1/2023 1131 by Daljit Horn RN  Outcome: Progressing  4/30/2023 2314 by Don Shafer RN  Outcome: Progressing  Flowsheets (Taken 4/30/2023 2040)  Electrolytes maintained within normal limits: Monitor labs and assess patient for signs and symptoms of electrolyte imbalances  Goal: Hemodynamic stability and optimal renal function maintained  5/1/2023

## 2023-05-01 NOTE — PROGRESS NOTES
Clinical Pharmacy Note  Vancomycin Consult    Fernando Downing is a 56 y.o. male ordered Vancomycin for bloodstream infection; consult received from Angi Amador 15 APRN-CNP to manage therapy. Also receiving Rocephin . Allergies:  Patient has no known allergies. Temp max:  Temp (24hrs), Av.2 °F (37.3 °C), Min:98.1 °F (36.7 °C), Max:100.8 °F (38.2 °C)      Recent Labs     23  1905 23  0715 23  0701   WBC 11.7* 12.1* 10.6       Recent Labs     23  1905 23  0715 23  0702   BUN 10 13 9   CREATININE 0.9 0.8* 0.8*         Intake/Output Summary (Last 24 hours) at 2023 0825  Last data filed at 2023 0510  Gross per 24 hour   Intake 5782.31 ml   Output --   Net 5782.31 ml     Culture Results:  Urine culture - pending  Blood - staph; sensitivities pending     Ht Readings from Last 1 Encounters:   23 6' 4\" (1.93 m)        Wt Readings from Last 1 Encounters:   23 216 lb 0.8 oz (98 kg)         Estimated Creatinine Clearance: 128 mL/min (A) (based on SCr of 0.8 mg/dL (L)). Assessment/Plan:  Day # 1 of vancomycin. Vancomycin 1250 mg IV every 12 hours. Goal -600  Predicted       Thank you for the consult.     Billy Mcgowan, [facility]

## 2023-05-01 NOTE — FLOWSHEET NOTE
Patient complained of right flank pain and PRN Toradol was given with effects noted. Patient has had no further complaints. Resting in bed quietly at this time.

## 2023-05-01 NOTE — CARE COORDINATION
Case Management Assessment  Initial Evaluation    Date/Time of Evaluation: 5/1/2023 5:58 PM  Assessment Completed by: BRYSON Posadas    If patient is discharged prior to next notation, then this note serves as note for discharge by case management. Patient Name: Quintin Bravo                   YOB: 1965  Diagnosis: Pyelonephritis [N12]  Tachycardia [R00.0]  Right flank pain [R10.9]                   Date / Time: 4/29/2023  5:35 PM    Patient Admission Status: Inpatient   Readmission Risk (Low < 19, Mod (19-27), High > 27): Readmission Risk Score: 8.6    Current PCP: Elin Em MD  PCP verified by CM? (P) Yes (Elin Em MD)    Chart Reviewed: Yes      History Provided by: (P) Patient  Patient Orientation: (P) Alert and Oriented    Patient Cognition: (P) Alert    Hospitalization in the last 30 days (Readmission):  No    If yes, Readmission Assessment in CM Navigator will be completed.     Advance Directives:      Code Status: Full Code   Patient's Primary Decision Maker is: (P) Legal Next of Kin      Discharge Planning:    Patient lives with: (P) Parent Type of Home: (P) House  Primary Care Giver: (P) Self  Patient Support Systems include: (P) Parent, Family Members, Friends/Neighbors   Current Financial resources: (P) None  Current community resources: (P) None  Current services prior to admission: (P) None            Current DME:              Type of Home Care services:  (P) None    ADLS  Prior functional level: (P) Independent in ADLs/IADLs  Current functional level: (P) Independent in ADLs/IADLs    PT AM-PAC:   /24  OT AM-PAC:   /24    Family can provide assistance at DC: (P) Other (comment) (patient is the primary caregiver for his mother and father.)  Would you like Case Management to discuss the discharge plan with any other family members/significant others, and if so, who? (P) No  Plans to Return to Present Housing: (P) Yes  Other Identified Issues/Barriers to RETURNING to current

## 2023-05-01 NOTE — CONSULTS
Clinical Pharmacy Note  Warfarin Consult    Dorothy Mcgowan is a 56 y.o. male receiving warfarin managed by pharmacy. Warfarin Indication: history of DVT  Target INR range: 2-3   Dose prior to admission:    -Patient reports: warfarin 10mg MWFSun, warfarin 5mg TuThuSat   -Per anti-coag clinic: warfarin 10mg MWF, warfarin 5mg TuThuSatSun    Current warfarin drug-drug interactions:   -ketorolac + warfarin (class D): enhanced anticoagulation effect of warfarin    Recent Labs     04/29/23  1905 04/30/23  0715 04/30/23  1315 05/01/23  0701   HGB 13.8 12.7*  --  12.8*   HCT 40.3* 37.1*  --  38.3*   INR 1.80*  --  1.86* 1.74*         Assessment/Plan:  -Managed by [facility] anti-coagulation clinic. Last INR within therapeutic range at 2.2 on 3/31  -Patient reported last dose taken on 4/29 (5mg)    Will order Warfarin 10 mg dose today. I Perfect Served the attending MD team today to see if bridging is needed with Don's history of DVT. Awaiting response.       -Daily PT/INR until stable within therapeutic range. Thank you for the consult. Will continue to follow.     Yaquelin Vazquez, [address] [place] [address]   5/1/2023 12:19 PM

## 2023-05-01 NOTE — ACP (ADVANCE CARE PLANNING)
Advance Care Planning     Advance Care Planning Activator (Inpatient)  Conversation Note      Date of ACP Conversation: 5/1/2023     Conversation Conducted with: Patient with Decision Making Capacity    ACP Activator: BRYSON Gonzalez    Health Care Decision Maker:     Current Designated Health Care Decision Maker:     Primary Decision Maker: Michaela Carlson - Parent - [phone] we documented Decision Maker(s) consistent with Legal Next of Kin hierarchy. Care Preferences    Ventilation: \"If you were in your present state of health and suddenly became very ill and were unable to breathe on your own, what would your preference be about the use of a ventilator (breathing machine) if it were available to you? \"      Would the patient desire the use of ventilator (breathing machine)?: yes    \"If your health worsens and it becomes clear that your chance of recovery is unlikely, what would your preference be about the use of a ventilator (breathing machine) if it were available to you? \"     Would the patient desire the use of ventilator (breathing machine)?: No      Resuscitation  \"CPR works best to restart the heart when there is a sudden event, like a heart attack, in someone who is otherwise healthy. Unfortunately, CPR does not typically restart the heart for people who have serious health conditions or who are very sick. \"    \"In the event your heart stopped as a result of an underlying serious health condition, would you want attempts to be made to restart your heart (answer \"yes\" for attempt to resuscitate) or would you prefer a natural death (answer \"no\" for do not attempt to resuscitate)? \" yes       [] Yes   [x] No   Educated Patient / Warrick Must regarding differences between Advance Directives and portable DNR orders.     Length of ACP Conversation in minutes:  5    Conversation Outcomes:  ACP discussion completed    Follow-up plan:    [] Schedule follow-up conversation to continue planning  []

## 2023-05-01 NOTE — PLAN OF CARE
Problem: Discharge Planning  Goal: Discharge to home or other facility with appropriate resources  4/30/2023 2314 by Gardenia Bethea RN  Outcome: Progressing  Flowsheets (Taken 4/30/2023 2040)  Discharge to home or other facility with appropriate resources:   Identify barriers to discharge with patient and caregiver   Arrange for needed discharge resources and transportation as appropriate   Identify discharge learning needs (meds, wound care, etc)  4/30/2023 1011 by Lord Homans, RN  Outcome: Progressing     Problem: Pain  Goal: Verbalizes/displays adequate comfort level or baseline comfort level  4/30/2023 2314 by Gardenia Bethea RN  Outcome: Progressing  4/30/2023 1011 by Lord Homans, RN  Outcome: Progressing     Problem: Safety - Adult  Goal: Free from fall injury  4/30/2023 2314 by Gardenia Bethea RN  Outcome: Progressing  4/30/2023 1011 by Lord Homans, RN  Outcome: Progressing     Problem: Metabolic/Fluid and Electrolytes - Adult  Goal: Electrolytes maintained within normal limits  4/30/2023 2314 by Gardenia Bethea RN  Outcome: Progressing  Flowsheets (Taken 4/30/2023 2040)  Electrolytes maintained within normal limits: Monitor labs and assess patient for signs and symptoms of electrolyte imbalances  4/30/2023 1011 by Lord Homans, RN  Outcome: Progressing  Goal: Hemodynamic stability and optimal renal function maintained  4/30/2023 2314 by Gardenia Bethea RN  Outcome: Progressing  Flowsheets (Taken 4/30/2023 2040)  Hemodynamic stability and optimal renal function maintained: Monitor labs and assess for signs and symptoms of volume excess or deficit  4/30/2023 1011 by Lord Homans, RN  Outcome: Progressing  Goal: Glucose maintained within prescribed range  4/30/2023 2314 by Gardenia Bethea RN  Outcome: Progressing  Flowsheets (Taken 4/30/2023 2040)  Glucose maintained within prescribed range: Monitor blood glucose as ordered  4/30/2023 1011 by Lord Homans, RN  Outcome: Progressing     Problem:

## 2023-05-02 ENCOUNTER — TELEPHONE (OUTPATIENT)
Dept: PHARMACY | Age: 58
End: 2023-05-02

## 2023-05-02 VITALS
SYSTOLIC BLOOD PRESSURE: 154 MMHG | OXYGEN SATURATION: 96 % | HEIGHT: 76 IN | TEMPERATURE: 98.7 F | WEIGHT: 216.05 LBS | DIASTOLIC BLOOD PRESSURE: 98 MMHG | RESPIRATION RATE: 18 BRPM | HEART RATE: 94 BPM | BODY MASS INDEX: 26.31 KG/M2

## 2023-05-02 LAB
ANION GAP SERPL CALCULATED.3IONS-SCNC: 14 MMOL/L (ref 3–16)
BACTERIA BLD CULT: ABNORMAL
BACTERIA BLD CULT: ABNORMAL
BACTERIA UR CULT: ABNORMAL
BASOPHILS # BLD: 0 K/UL (ref 0–0.2)
BASOPHILS NFR BLD: 0.5 %
BUN SERPL-MCNC: 9 MG/DL (ref 7–20)
CALCIUM SERPL-MCNC: 9.1 MG/DL (ref 8.3–10.6)
CHLORIDE SERPL-SCNC: 104 MMOL/L (ref 99–110)
CO2 SERPL-SCNC: 22 MMOL/L (ref 21–32)
CREAT SERPL-MCNC: 0.7 MG/DL (ref 0.9–1.3)
DEPRECATED RDW RBC AUTO: 12.9 % (ref 12.4–15.4)
EOSINOPHIL # BLD: 0.2 K/UL (ref 0–0.6)
EOSINOPHIL NFR BLD: 1.9 %
EST. AVERAGE GLUCOSE BLD GHB EST-MCNC: 168.6 MG/DL
GFR SERPLBLD CREATININE-BSD FMLA CKD-EPI: >60 ML/MIN/{1.73_M2}
GLUCOSE BLD-MCNC: 155 MG/DL (ref 70–99)
GLUCOSE BLD-MCNC: 163 MG/DL (ref 70–99)
GLUCOSE SERPL-MCNC: 170 MG/DL (ref 70–99)
HBA1C MFR BLD: 7.5 %
HCT VFR BLD AUTO: 37.2 % (ref 40.5–52.5)
HGB BLD-MCNC: 12.8 G/DL (ref 13.5–17.5)
INR PPP: 2.2 (ref 0.84–1.16)
LYMPHOCYTES # BLD: 1 K/UL (ref 1–5.1)
LYMPHOCYTES NFR BLD: 12.3 %
MCH RBC QN AUTO: 32.5 PG (ref 26–34)
MCHC RBC AUTO-ENTMCNC: 34.3 G/DL (ref 31–36)
MCV RBC AUTO: 94.9 FL (ref 80–100)
MONOCYTES # BLD: 0.9 K/UL (ref 0–1.3)
MONOCYTES NFR BLD: 10.7 %
NEUTROPHILS # BLD: 6.2 K/UL (ref 1.7–7.7)
NEUTROPHILS NFR BLD: 74.6 %
ORGANISM: ABNORMAL
PERFORMED ON: ABNORMAL
PERFORMED ON: ABNORMAL
PLATELET # BLD AUTO: 188 K/UL (ref 135–450)
PMV BLD AUTO: 8.9 FL (ref 5–10.5)
POTASSIUM SERPL-SCNC: 4 MMOL/L (ref 3.5–5.1)
PROTHROMBIN TIME: 24.3 SEC (ref 11.5–14.8)
RBC # BLD AUTO: 3.92 M/UL (ref 4.2–5.9)
SODIUM SERPL-SCNC: 140 MMOL/L (ref 136–145)
VANCOMYCIN SERPL-MCNC: 6.6 UG/ML
WBC # BLD AUTO: 8.3 K/UL (ref 4–11)

## 2023-05-02 PROCEDURE — 36415 COLL VENOUS BLD VENIPUNCTURE: CPT

## 2023-05-02 PROCEDURE — 80048 BASIC METABOLIC PNL TOTAL CA: CPT

## 2023-05-02 PROCEDURE — 6370000000 HC RX 637 (ALT 250 FOR IP): Performed by: INTERNAL MEDICINE

## 2023-05-02 PROCEDURE — 6370000000 HC RX 637 (ALT 250 FOR IP): Performed by: NURSE PRACTITIONER

## 2023-05-02 PROCEDURE — 2580000003 HC RX 258: Performed by: INTERNAL MEDICINE

## 2023-05-02 PROCEDURE — 85025 COMPLETE CBC W/AUTO DIFF WBC: CPT

## 2023-05-02 PROCEDURE — 6360000002 HC RX W HCPCS: Performed by: NURSE PRACTITIONER

## 2023-05-02 PROCEDURE — 6360000002 HC RX W HCPCS: Performed by: INTERNAL MEDICINE

## 2023-05-02 PROCEDURE — 85610 PROTHROMBIN TIME: CPT

## 2023-05-02 PROCEDURE — 80202 ASSAY OF VANCOMYCIN: CPT

## 2023-05-02 PROCEDURE — 83036 HEMOGLOBIN GLYCOSYLATED A1C: CPT

## 2023-05-02 RX ORDER — LEVOFLOXACIN 500 MG/1
500 TABLET, FILM COATED ORAL DAILY
Qty: 10 TABLET | Refills: 0 | Status: SHIPPED | OUTPATIENT
Start: 2023-05-02 | End: 2023-05-12

## 2023-05-02 RX ORDER — AMLODIPINE BESYLATE 10 MG/1
TABLET ORAL
Qty: 30 TABLET | Refills: 1 | Status: SHIPPED | OUTPATIENT
Start: 2023-05-02

## 2023-05-02 RX ORDER — WARFARIN SODIUM 5 MG/1
5 TABLET ORAL
Status: DISCONTINUED | OUTPATIENT
Start: 2023-05-02 | End: 2023-05-02 | Stop reason: HOSPADM

## 2023-05-02 RX ADMIN — KETOROLAC TROMETHAMINE 30 MG: 30 INJECTION, SOLUTION INTRAMUSCULAR at 09:55

## 2023-05-02 RX ADMIN — AMLODIPINE BESYLATE 10 MG: 10 TABLET ORAL at 09:44

## 2023-05-02 RX ADMIN — PANTOPRAZOLE SODIUM 40 MG: 40 TABLET, DELAYED RELEASE ORAL at 06:05

## 2023-05-02 RX ADMIN — ACETAMINOPHEN, ASPIRIN, CAFFEINE 2 TABLET: 250; 65; 250 TABLET, FILM COATED ORAL at 12:11

## 2023-05-02 RX ADMIN — ENOXAPARIN SODIUM 40 MG: 100 INJECTION SUBCUTANEOUS at 09:45

## 2023-05-02 RX ADMIN — CEFTRIAXONE SODIUM 2000 MG: 2 INJECTION, POWDER, FOR SOLUTION INTRAMUSCULAR; INTRAVENOUS at 12:47

## 2023-05-02 ASSESSMENT — PAIN DESCRIPTION - ORIENTATION
ORIENTATION: MID;LOWER
ORIENTATION: MID;LOWER

## 2023-05-02 ASSESSMENT — PAIN - FUNCTIONAL ASSESSMENT
PAIN_FUNCTIONAL_ASSESSMENT: ACTIVITIES ARE NOT PREVENTED
PAIN_FUNCTIONAL_ASSESSMENT: ACTIVITIES ARE NOT PREVENTED

## 2023-05-02 ASSESSMENT — PAIN DESCRIPTION - DESCRIPTORS
DESCRIPTORS: CRAMPING;SHARP
DESCRIPTORS: STABBING;ACHING

## 2023-05-02 ASSESSMENT — PAIN SCALES - GENERAL
PAINLEVEL_OUTOF10: 6
PAINLEVEL_OUTOF10: 6
PAINLEVEL_OUTOF10: 5

## 2023-05-02 ASSESSMENT — PAIN DESCRIPTION - LOCATION
LOCATION: BACK
LOCATION: BACK

## 2023-05-02 NOTE — PROGRESS NOTES
Pt AOX4, pt denied n/v, SOB & diarrhea - pt c/o 6/10 pain - See MAR - pt denied any further needs at this time - bed in lowest and locked position with bedside table and call light within reach - non skid socks and MIGUELANGEL on

## 2023-05-02 NOTE — PLAN OF CARE
Problem: Discharge Planning  Goal: Discharge to home or other facility with appropriate resources  5/1/2023 2346 by Saurabh Dwyer RN  Outcome: Progressing  Flowsheets (Taken 5/1/2023 2100)  Discharge to home or other facility with appropriate resources:   Identify barriers to discharge with patient and caregiver   Arrange for needed discharge resources and transportation as appropriate   Identify discharge learning needs (meds, wound care, etc)  5/1/2023 1131 by Charissa Edgar RN  Outcome: Progressing     Problem: Pain  Goal: Verbalizes/displays adequate comfort level or baseline comfort level  5/1/2023 2346 by Saurabh Dwyer RN  Outcome: Progressing  5/1/2023 1131 by Charissa Egdar RN  Outcome: Progressing  Flowsheets (Taken 4/30/2023 2315 by Saurabh Dwyer RN)  Verbalizes/displays adequate comfort level or baseline comfort level:   Encourage patient to monitor pain and request assistance   Assess pain using appropriate pain scale   Administer analgesics based on type and severity of pain and evaluate response   Implement non-pharmacological measures as appropriate and evaluate response   Consider cultural and social influences on pain and pain management   Notify Licensed Independent Practitioner if interventions unsuccessful or patient reports new pain     Problem: Safety - Adult  Goal: Free from fall injury  5/1/2023 2346 by Saurabh Dwyer RN  Outcome: Progressing  5/1/2023 1131 by Charissa Edgar RN  Outcome: Progressing     Problem: Metabolic/Fluid and Electrolytes - Adult  Goal: Electrolytes maintained within normal limits  5/1/2023 2346 by Saurabh Dwyer RN  Outcome: Progressing  Flowsheets (Taken 5/1/2023 2100)  Electrolytes maintained within normal limits: Monitor labs and assess patient for signs and symptoms of electrolyte imbalances  5/1/2023 1131 by Charissa Edgar RN  Outcome: Progressing  Goal: Hemodynamic stability and optimal renal function maintained  5/1/2023 2346 by

## 2023-05-02 NOTE — DISCHARGE INSTR - DIET

## 2023-05-02 NOTE — FLOWSHEET NOTE
Patient complained of Right flank pain once so far this shift and received PRN Toradol with effects noted. Patient resting in bed watching T.V. at this time.

## 2023-05-02 NOTE — PLAN OF CARE
Problem: Discharge Planning  Goal: Discharge to home or other facility with appropriate resources  Outcome: Progressing     Problem: Pain  Goal: Verbalizes/displays adequate comfort level or baseline comfort level  Outcome: Progressing     Problem: Safety - Adult  Goal: Free from fall injury  Outcome: Progressing     Problem: Metabolic/Fluid and Electrolytes - Adult  Goal: Electrolytes maintained within normal limits  Outcome: Progressing  Goal: Hemodynamic stability and optimal renal function maintained  Outcome: Progressing  Goal: Glucose maintained within prescribed range  Outcome: Progressing     Problem: Genitourinary - Adult  Goal: Absence of urinary retention  Outcome: Progressing     Problem: Infection - Adult  Goal: Absence of infection at discharge  Outcome: Progressing  Goal: Absence of infection during hospitalization  Outcome: Progressing  Goal: Absence of fever/infection during anticipated neutropenic period  Outcome: Progressing     Problem: ABCDS Injury Assessment  Goal: Absence of physical injury  Outcome: Progressing

## 2023-05-02 NOTE — CARE COORDINATION
Case Management Discharge Note    Date / Time of Note: 5/2/2023 3:17 PM                  Patient Name: Mirella Baker     YOB: 1965  Diagnosis: Pyelonephritis [N12]  Tachycardia [R00.0]  Right flank pain [R10.9]     Date / Time: 4/29/2023  5:35 PM    Financial:  Payor: [facility] [address] PPO / Plan: [facility] PPO / Product Type: *No Product type* /      Pharmacy:    [facility] [address] [facility] [phone] - F [phone]  [address] [address]  Phone: [phone] Fax: [phone]      Assistance purchasing medications?: Potential Assistance Purchasing Medications: No  Assistance provided by Case Management: None at this time    DISCHARGE Disposition: Home- No Services Needed    Home Care:  Home Care ordered at discharge: No    Durable Medical Equipment:  Equipment obtained during hospitalization: None    Home Oxygen and Respiratory Equipment:  Oxygen needed at discharge?: No    Dialysis:  Dialysis patient: No    Transportation:  Transportation PLAN for discharge: family   Mode of Transport: Private Car    IMM Completed:   Not Indicated    Additional CM Notes: Discharge to home with parents. Denies needs. AVS reviewed.     The Plan for Transition of Care is related to the following treatment goals of Pyelonephritis [N12]  Tachycardia [R00.0]  Right flank pain [R10.9]    Amanda Luna RN  [facility]   Case Management Department  Ph: [phone]  Electronically signed by Amanda Luna RN on 5/2/2023 at 3:20 PM

## 2023-05-02 NOTE — PLAN OF CARE
Problem: Discharge Planning  Goal: Discharge to home or other facility with appropriate resources  5/2/2023 1445 by Cristofer Camejo RN  Outcome: Completed  5/2/2023 1423 by Cristofer Camejo RN  Outcome: Progressing     Problem: Pain  Goal: Verbalizes/displays adequate comfort level or baseline comfort level  5/2/2023 1445 by Cristofer Camejo RN  Outcome: Completed  5/2/2023 1423 by Cristofer Camejo RN  Outcome: Progressing     Problem: Safety - Adult  Goal: Free from fall injury  5/2/2023 1445 by Cristofer Camejo RN  Outcome: Completed  5/2/2023 1423 by Cristofer Camejo RN  Outcome: Progressing     Problem: Metabolic/Fluid and Electrolytes - Adult  Goal: Electrolytes maintained within normal limits  5/2/2023 1445 by Cristofer Camejo RN  Outcome: Completed  5/2/2023 1423 by Cristofer Camejo RN  Outcome: Progressing  Goal: Hemodynamic stability and optimal renal function maintained  5/2/2023 1445 by Cristofer Camejo RN  Outcome: Completed  5/2/2023 1423 by Cristofer Camejo RN  Outcome: Progressing  Goal: Glucose maintained within prescribed range  5/2/2023 1445 by Cristofer Camejo RN  Outcome: Completed  5/2/2023 1423 by Cristofer Camejo RN  Outcome: Progressing     Problem: Genitourinary - Adult  Goal: Absence of urinary retention  5/2/2023 1445 by Cristofer Camejo RN  Outcome: Completed  5/2/2023 1423 by Cristofer Camejo RN  Outcome: Progressing     Problem: Infection - Adult  Goal: Absence of infection at discharge  5/2/2023 1445 by Cristofer Camejo RN  Outcome: Completed  5/2/2023 1423 by Cristofer Camejo RN  Outcome: Progressing  Goal: Absence of infection during hospitalization  5/2/2023 1445 by Cristofer Camejo RN  Outcome: Completed  5/2/2023 1423 by Cristofer Camejo RN  Outcome: Progressing  Goal: Absence of fever/infection during anticipated neutropenic period  5/2/2023 1445 by Cristofer Camejo RN  Outcome: Completed  5/2/2023 1423 by Stefano Howell

## 2023-05-02 NOTE — TELEPHONE ENCOUNTER
Russel Westfall called and left a message to let us know that he is in the hospital.     I returned his call and let him know that we were aware of his hospitalization and that his INR will be monitored during his stay and warfarin adjusted as appropriate. Advised we would f/u at discharge to make sure he has an appropriate f/u appointment depending on discharge instructions. He was appreciative. Wished him well. Will follow along. Jericho Salazar, PharmD, [address] [place] [address]  [phone]    For Pharmacy Admin Tracking Only    Intervention Detail:   Total # of Interventions Recommended:    Total # of Interventions Accepted:   Time Spent (min): 10

## 2023-05-02 NOTE — CONSULTS
Clinical Pharmacy Note  Warfarin Consult    Molly Geronimo is a 56 y.o. male receiving warfarin managed by pharmacy. Warfarin Indication: history of DVT  Target INR range: 2-3   Dose prior to admission:    -Patient reports: warfarin 10mg MWFSun, warfarin 5mg TuThuSat   -Per anti-coag clinic: warfarin 10mg MWF, warfarin 5mg TuThuSatSun    Current warfarin drug-drug interactions:   -ketorolac + warfarin (class D): enhanced anticoagulation effect of warfarin    Recent Labs     04/30/23  0715 04/30/23  1315 05/01/23  0701 05/02/23  0745   HGB 12.7*  --  12.8* 12.8*   HCT 37.1*  --  38.3* 37.2*   INR  --  1.86* 1.74* 2.20*         Assessment/Plan:  -Managed by [facility] anti-coagulation clinic. Last INR within therapeutic range at 2.2 on 3/31  -Patient reported last dose taken on 4/29 (5mg)    Will order Warfarin 5 mg dose today.    -Daily PT/INR until stable within therapeutic range. Thank you for the consult. Will continue to follow.     Anita Earl, [facility]   5/2/2023 10:54 AM

## 2023-05-02 NOTE — DISCHARGE SUMMARY
or fluid. No pneumoperitoneum. Bones/Soft Tissues: Chronic mild anterior wedging T12 unchanged from prior study. Degenerative changes thoracolumbar spine. No acute superficial soft tissue or osseous structure abnormality evident. 1.  No CT evidence of an acute intra-abdominal or intrapelvic process. 2. Punctate, nonobstructing calculi bilateral kidneys; no ureter calculus or hydronephrosis. 3.  No findings to suggest acute appendicitis. 4. Mild diverticulosis coli without CT evidence of acute diverticulitis. 5. Hepatic steatosis and hepatomegaly. CBC:   Recent Labs     04/30/23  0715 05/01/23  0701 05/02/23  0745   WBC 12.1* 10.6 8.3   HGB 12.7* 12.8* 12.8*    185 188     BMP:    Recent Labs     04/30/23  0715 05/01/23  0702 05/02/23  0745    135* 140   K 3.9 3.9 4.0    101 104   CO2 24 25 22   BUN 13 9 9   CREATININE 0.8* 0.8* 0.7*   GLUCOSE 180* 161* 170*     Hepatic:   Recent Labs     04/29/23  1905   AST 16   ALT 15   BILITOT 0.4   ALKPHOS 77     Lipids:   Lab Results   Component Value Date/Time    CHOL 221 07/05/2019 07:30 AM    HDL 32 07/05/2019 07:30 AM    TRIG 202 07/05/2019 07:30 AM     Hemoglobin A1C:   Lab Results   Component Value Date/Time    LABA1C 5.9 07/05/2019 07:30 AM     TSH:   Lab Results   Component Value Date/Time    TSH 1.97 07/05/2019 07:30 AM     Troponin: No results found for: TROPONINT  Lactic Acid: No results for input(s): LACTA in the last 72 hours. BNP: No results for input(s): PROBNP in the last 72 hours.   UA:  Lab Results   Component Value Date/Time    NITRU Negative 04/29/2023 07:05 PM    COLORU Yellow 04/29/2023 07:05 PM    PHUR 6.0 04/29/2023 07:05 PM    WBCUA 253 04/29/2023 07:05 PM    RBCUA 23 04/29/2023 07:05 PM    BACTERIA 2+ 04/29/2023 07:05 PM    CLARITYU CLOUDY 04/29/2023 07:05 PM    SPECGRAV 1.015 04/29/2023 07:05 PM    LEUKOCYTESUR MODERATE 04/29/2023 07:05 PM    UROBILINOGEN 0.2 04/29/2023 07:05 PM    BILIRUBINUR Negative 04/29/2023 07:05

## 2023-05-03 ENCOUNTER — TELEPHONE (OUTPATIENT)
Dept: FAMILY MEDICINE CLINIC | Age: 58
End: 2023-05-03

## 2023-05-03 ENCOUNTER — OFFICE VISIT (OUTPATIENT)
Dept: FAMILY MEDICINE CLINIC | Age: 58
End: 2023-05-03
Payer: COMMERCIAL

## 2023-05-03 VITALS
HEIGHT: 76 IN | SYSTOLIC BLOOD PRESSURE: 124 MMHG | WEIGHT: 215.6 LBS | HEART RATE: 101 BPM | OXYGEN SATURATION: 100 % | BODY MASS INDEX: 26.25 KG/M2 | TEMPERATURE: 97.9 F | RESPIRATION RATE: 16 BRPM | DIASTOLIC BLOOD PRESSURE: 74 MMHG

## 2023-05-03 DIAGNOSIS — E11.65 UNCONTROLLED TYPE 2 DIABETES MELLITUS WITH HYPERGLYCEMIA (HCC): ICD-10-CM

## 2023-05-03 DIAGNOSIS — E78.2 MIXED HYPERLIPIDEMIA: ICD-10-CM

## 2023-05-03 DIAGNOSIS — N12 PYELONEPHRITIS OF RIGHT KIDNEY: ICD-10-CM

## 2023-05-03 DIAGNOSIS — I10 ESSENTIAL HYPERTENSION: ICD-10-CM

## 2023-05-03 DIAGNOSIS — Z09 HOSPITAL DISCHARGE FOLLOW-UP: Primary | ICD-10-CM

## 2023-05-03 DIAGNOSIS — N20.0 BILATERAL RENAL STONES: ICD-10-CM

## 2023-05-03 DIAGNOSIS — N12 PYELONEPHRITIS: Primary | ICD-10-CM

## 2023-05-03 LAB
BACTERIA BLD CULT ORG #2: NORMAL
BILIRUBIN, POC: NORMAL
BLOOD URINE, POC: NORMAL
CLARITY, POC: CLEAR
COLOR, POC: YELLOW
GLUCOSE URINE, POC: 100
KETONES, POC: NORMAL
LEUKOCYTE EST, POC: NORMAL
NITRITE, POC: NORMAL
PH, POC: 5.5
PROTEIN, POC: NORMAL
SPECIFIC GRAVITY, POC: 1.02
UROBILINOGEN, POC: 0.2

## 2023-05-03 PROCEDURE — 99214 OFFICE O/P EST MOD 30 MIN: CPT | Performed by: NURSE PRACTITIONER

## 2023-05-03 PROCEDURE — 81002 URINALYSIS NONAUTO W/O SCOPE: CPT | Performed by: NURSE PRACTITIONER

## 2023-05-03 PROCEDURE — 3074F SYST BP LT 130 MM HG: CPT | Performed by: NURSE PRACTITIONER

## 2023-05-03 PROCEDURE — 3051F HG A1C>EQUAL 7.0%<8.0%: CPT | Performed by: NURSE PRACTITIONER

## 2023-05-03 PROCEDURE — 3078F DIAST BP <80 MM HG: CPT | Performed by: NURSE PRACTITIONER

## 2023-05-03 PROCEDURE — 1111F DSCHRG MED/CURRENT MED MERGE: CPT | Performed by: NURSE PRACTITIONER

## 2023-05-03 RX ORDER — TRAMADOL HYDROCHLORIDE 50 MG/1
50 TABLET ORAL EVERY 6 HOURS PRN
Qty: 12 TABLET | Refills: 0 | Status: SHIPPED | OUTPATIENT
Start: 2023-05-03 | End: 2023-05-06

## 2023-05-03 RX ORDER — METFORMIN HYDROCHLORIDE 500 MG/1
500 TABLET, EXTENDED RELEASE ORAL 2 TIMES DAILY WITH MEALS
Qty: 60 TABLET | Refills: 2 | Status: SHIPPED | OUTPATIENT
Start: 2023-05-03

## 2023-05-03 ASSESSMENT — ENCOUNTER SYMPTOMS
DIARRHEA: 0
VOMITING: 0
NAUSEA: 0
COUGH: 0
SHORTNESS OF BREATH: 0
CONSTIPATION: 0
ABDOMINAL PAIN: 0

## 2023-05-03 NOTE — PROGRESS NOTES
Reviewed discharge and follow up information with pt - answered all questions - pt stable w/out s/sx of distress at discharge - refused wheelchair transportation to lobby

## 2023-05-03 NOTE — TELEPHONE ENCOUNTER
Short term med sent in for pain since he is unable to take NSAID since he is on warfarin  Reviewed note from today and hospital stay

## 2023-05-03 NOTE — TELEPHONE ENCOUNTER
Pt was seen this morning stated there was nothing called in for the pain he got the metformin       Please advise no

## 2023-05-03 NOTE — PROGRESS NOTES
known as: CLARITIN-D 12HR     meloxicam 15 MG tablet  Commonly known as: MOBIC     metFORMIN 500 MG extended release tablet  Commonly known as: GLUCOPHAGE-XR  TAKE ONE TABLET BY MOUTH DAILY WITH A MEAL     pravastatin 20 MG tablet  Commonly known as: PRAVACHOL  Take 1 tablet by mouth nightly               Medications marked \"taking\" at this time  Outpatient Medications Marked as Taking for the 5/3/23 encounter (Office Visit) with HALEY Mac - CNP   Medication Sig Dispense Refill    amLODIPine (NORVASC) 10 MG tablet TAKE ONE TABLET BY MOUTH DAILY 30 tablet 1    levoFLOXacin (LEVAQUIN) 500 MG tablet Take 1 tablet by mouth daily for 10 days 10 tablet 0    meloxicam (MOBIC) 15 MG tablet Take 1 tablet by mouth daily as needed      warfarin (COUMADIN) 10 MG tablet Take 5mg daily except 10mg on Monday, Wednesday and Friday or as directed by [address] [place] [address] Coumadin Service [phone] (Patient taking differently: 10 mg Mon, Wed, Fri, Sun; 5 mg Tues, Thurs, Sat) 65 tablet 2    pravastatin (PRAVACHOL) 20 MG tablet Take 1 tablet by mouth nightly 90 tablet 0    metFORMIN (GLUCOPHAGE-XR) 500 MG extended release tablet TAKE ONE TABLET BY MOUTH DAILY WITH A MEAL 30 tablet 2    loratadine-pseudoephedrine (CLARITIN-D 12HR) 5-120 MG per extended release tablet Take 1 tablet by mouth daily as needed (allergies)      Acetaminophen-Caffeine (EXCEDRIN TENSION HEADACHE) TABS Take 2 tablets by mouth daily as needed (migraine)      esomeprazole (NEXIUM) 20 MG delayed release capsule Take 1 capsule by mouth daily          Medications patient taking as of now reconciled against medications ordered at time of hospital discharge: Yes    Review of Systems   Constitutional:  Positive for activity change and fatigue. Negative for fever. Respiratory:  Negative for cough and shortness of breath. Cardiovascular:  Negative for chest pain. Gastrointestinal:  Negative for abdominal pain, constipation, diarrhea, nausea and vomiting.

## 2023-05-04 NOTE — PROGRESS NOTES
Physician Progress Note      Jhonatan Hernández  CSN #:                  [id number]  :                       1965  ADMIT DATE:       2023 5:35 PM  [address] [place] DATE:        2023 3:23 PM  RESPONDING  PROVIDER #:        Francheska Camilo MD          QUERY TEXT:    Dear Dr. Luis Wyman,  Patient admitted with E-coli UTI , noted to have SIRS with leukocytosis,   neutrophilia, tachycardia and fever . If possible, please document in progress   notes and discharge summary if you are evaluating and/or treating any of the   following: The medical record reflects the following:  Risk Factors: current e-coli UTI  Clinical Indicators: ED for flank pain, concern for UTI, reported chills,   fatigue, abd pain dysuria, urgency, LV=767, Admit Pyelonephritis of right   kidney, acute cystitis without significant hematuria, WBC initially=11.7,    PCP notes \"-SIR-Criteria met: Leukocytosis, tachycardic. Low-grade fever on   admission,  WBC=12.1, Neutrophils=9.2, JL=802,  temp up to 100.8,   WJ=195-852. Treatment: Rocephin, cultures, plan for Levaquin x 10 days  Thank you,  Yancy Liu RN, [email or web address]  Options provided:  -- Evolving Sepsis due to UTI poa confirmed  -- Other - I will add my own diagnosis  -- Disagree - Not applicable / Not valid  -- Disagree - Clinically unable to determine / Unknown  -- Refer to Clinical Documentation Reviewer    PROVIDER RESPONSE TEXT:    This patient has evolving Sepsis due to UTI poa confirmed. Query created by:  Zelalem Trinidad on 2023 3:26 PM      Electronically signed by:  Francheska Camilo MD 2023 7:54 AM

## 2023-05-05 LAB
BACTERIA BLD CULT ORG #2: NORMAL
BACTERIA BLD CULT: NORMAL

## 2023-05-10 ENCOUNTER — TELEPHONE (OUTPATIENT)
Dept: PHARMACY | Age: 58
End: 2023-05-10

## 2023-05-10 NOTE — TELEPHONE ENCOUNTER
It appears that Mr. Elvi Sullivan was discharged from [facility] on 5-2-23 with a 10 day prescription for Levaquin for pyelonephritis. Given that Levaquin can interact with the warfarin, I left a message asking patient to call us as soon as possible to arrange an INR appointment.     [address]  Anticoagulation Service  [phone]

## 2023-05-12 ENCOUNTER — ANTI-COAG VISIT (OUTPATIENT)
Dept: PHARMACY | Age: 58
End: 2023-05-12
Payer: COMMERCIAL

## 2023-05-12 DIAGNOSIS — Z86.718 HISTORY OF DVT (DEEP VEIN THROMBOSIS): Primary | ICD-10-CM

## 2023-05-12 LAB — INTERNATIONAL NORMALIZATION RATIO, POC: 4.9

## 2023-05-12 PROCEDURE — 85610 PROTHROMBIN TIME: CPT

## 2023-05-12 PROCEDURE — 99212 OFFICE O/P EST SF 10 MIN: CPT

## 2023-05-12 NOTE — PROGRESS NOTES
this interaction. He feels well and is eating well. Will hold warfarin for two days and then resume his regular weekly warfarin dose which has worked very well for him. Description    Do not take Warfarin today and tomorrow ONLY  THEN CONTINUE: Warfarin 5mg (1/2 tablet) daily EXCEPT 10mg (1 tablet) on Monday, Wednesday and Friday    Call right away with any signs or symptoms of bleeding especially while taking Meloxicam. Meloxicam 3/2/23 for 30 days    Call [phone] with signs or symptoms of bleeding or ANY medication changes (including antibiotics, steroids, over-the-counter medications or herbal supplements). If significant bleeding occurs or if you fall and hit your head, please seek immediate medical attention. Keep the number of servings of vitamin K containing foods (dark green, leafy vegetables) the same each week. Please call if this changes. Limit alcohol intake. Please call if this changes. Immunization History   Administered Date(s) Administered    COVID-19, PFIZER PURPLE top, DILUTE for use, (age 15 y+), 30mcg/0.3mL 2021, 2021    TDaP, ADAGABBI (age 10y-63y), Sissy Méndez (age 10y+), IM, 0.5mL 2019    Td vaccine (adult) 2015       Orders Placed This Encounter   Procedures    POCT INR     This external order was created through the results console. No orders of the defined types were placed in this encounter. Reviewed AVS with patient / caregiver.     Billing Points:  Adjust dosage and/or reconcile meds (fill pill box) </= 5 medications - 2 points  BASIC ASSESSMENT UNCOMPLICATED (including but not limited to: vital signs; physical assessment screening; review of secondary markers like lab results, allergies, home readings; instructions on treatment plan and basic education; assessment of medication list with one med change and compliance) - 2 points     For Pharmacy Admin Tracking Only    Intervention Detail: Adherence Monitorin and Dose

## 2023-06-09 ENCOUNTER — ANTI-COAG VISIT (OUTPATIENT)
Dept: PHARMACY | Age: 58
End: 2023-06-09
Payer: COMMERCIAL

## 2023-06-09 DIAGNOSIS — Z86.718 HISTORY OF DVT (DEEP VEIN THROMBOSIS): Primary | ICD-10-CM

## 2023-06-09 LAB — INR BLD: 1.5

## 2023-06-09 PROCEDURE — 85610 PROTHROMBIN TIME: CPT

## 2023-06-09 PROCEDURE — 99213 OFFICE O/P EST LOW 20 MIN: CPT

## 2023-06-09 NOTE — PROGRESS NOTES
Titi Santana is a 56 y.o. here for warfarin management. Jorge Cerna had an INR test today. Results were reviewed and appropriate warfarin management was completed. Patient verifies current warfarin dosing regimen: Yes     Warfarin medication reviewed and updated on the patient 's home medication list: Yes   All other medications reviewed and updated on the patient 's home medication list: Yes     Lab Results   Component Value Date    INR 1.50 2023    INR 4.9 2023    INR 2.20 (H) 2023     Patient Findings       Positives:  Missed doses, Change in medications    Negatives:  Signs/symptoms of bleeding, Change in diet/appetite, Bruising    Comments:  Has been taking Excedrin with aspirin in it every day starting   Has been taking a half tablet of warfarin every day - 5 mg every day since           Anticoagulation Summary  As of 2023      INR goal:  2.0-3.0   TTR:  56.6 % (1 y)   INR used for dosin.50 (2023)   Warfarin maintenance plan:  10 mg (10 mg x 1) every Mon, Wed, Fri; 5 mg (10 mg x 0.5) all other days   Weekly warfarin total:  50 mg   Plan last modified:  Man Arias, [facility] (2022)   Next INR check:  2023   Priority:  Maintenance   Target end date: Indefinite    Indications    History of DVT (deep vein thrombosis) [Z86.718]                 Anticoagulation Episode Summary       INR check location:      Preferred lab:      Send INR reminders to:  [facility]AFF    Comments:  [facility]          Anticoagulation Care Providers       Provider Role Specialty Phone number    Laura Mijares MD Referring Family Medicine [phone]          There were no vitals taken for this visit. Warfarin assessment / plan:     Appears well  Sub-therapeutic INR     Denies increased vitamin K intake. Missed Warfarin dose(s) on the following dates: Only took 5 mg on  and  instead of 10 mg. Medication changes.  Started taking Excedrin with aspirin on     He stated

## 2023-07-07 ENCOUNTER — ANTI-COAG VISIT (OUTPATIENT)
Dept: PHARMACY | Age: 58
End: 2023-07-07
Payer: COMMERCIAL

## 2023-07-07 DIAGNOSIS — Z86.718 HISTORY OF DVT (DEEP VEIN THROMBOSIS): Primary | ICD-10-CM

## 2023-07-07 LAB — INTERNATIONAL NORMALIZATION RATIO, POC: 2.8

## 2023-07-07 PROCEDURE — 85610 PROTHROMBIN TIME: CPT

## 2023-07-07 PROCEDURE — 99211 OFF/OP EST MAY X REQ PHY/QHP: CPT

## 2023-07-07 NOTE — PROGRESS NOTES
Evelyn Rasheed is a 56 y.o. here for warfarin management. Nubia Gonzales had an INR test today. Results were reviewed and appropriate warfarin management was completed. Patient verifies current warfarin dosing regimen: Yes     Warfarin medication reviewed and updated on the patient 's home medication list: Yes   All other medications reviewed and updated on the patient 's home medication list: No: no changes     Lab Results   Component Value Date    INR 2.8 2023    INR 1.50 2023    INR 4.9 2023     Patient Findings       Positives:  Change in health, Bruising    Negatives:  Signs/symptoms of bleeding, Missed doses, Change in medications, Change in diet/appetite    Comments:  He has a very large bruise on his right foot where a piece of steel fell on his foot          Anticoagulation Summary  As of 2023      INR goal:  2.0-3.0   TTR:  57.0 % (1.1 y)   INR used for dosin.8 (2023)   Warfarin maintenance plan:  10 mg (10 mg x 1) every Mon, Wed, Fri; 5 mg (10 mg x 0.5) all other days   Weekly warfarin total:  50 mg   Plan last modified:  [address] [facility] (2022)   Next INR check:  2023   Priority:  Maintenance   Target end date: Indefinite    Indications    History of DVT (deep vein thrombosis) [Z86.718]                 Anticoagulation Episode Summary       INR check location:      Preferred lab:      Send INR reminders to:  [facility]AFF    [facility]          Anticoagulation Care Providers       Provider Role Specialty Phone number    Dorys Guzman MD Referring Family Medicine [phone]          There were no vitals taken for this visit. Warfarin assessment / plan:     His right foot is very swollen and bruised today. He dropped a large piece of steel on his foot yesterday. The xray showed no fracture, but his is still in a great deal of pain. He held his warfarin last evening due to the extensive bruising.      No changes affecting warfarin therapy were

## 2023-07-24 DIAGNOSIS — I10 ESSENTIAL HYPERTENSION: ICD-10-CM

## 2023-07-25 RX ORDER — AMLODIPINE BESYLATE 10 MG/1
TABLET ORAL
Qty: 30 TABLET | Refills: 1 | Status: SHIPPED | OUTPATIENT
Start: 2023-07-25

## 2023-08-04 ENCOUNTER — ANTI-COAG VISIT (OUTPATIENT)
Dept: PHARMACY | Age: 58
End: 2023-08-04
Payer: COMMERCIAL

## 2023-08-04 DIAGNOSIS — Z86.718 HISTORY OF DVT (DEEP VEIN THROMBOSIS): Primary | ICD-10-CM

## 2023-08-04 LAB — INTERNATIONAL NORMALIZATION RATIO, POC: 4.1

## 2023-08-04 PROCEDURE — 99211 OFF/OP EST MAY X REQ PHY/QHP: CPT

## 2023-08-04 PROCEDURE — 85610 PROTHROMBIN TIME: CPT

## 2023-08-04 NOTE — PROGRESS NOTES
Gerald Giron is a 56 y.o. here for warfarin management. Arias Willis had an INR test today. Results were reviewed and appropriate warfarin management was completed. Patient verifies current warfarin dosing regimen: Yes     Warfarin medication reviewed and updated on the patient 's home medication list: Yes   All other medications reviewed and updated on the patient 's home medication list: Yes     Lab Results   Component Value Date    INR 4.1 2023    INR 2.8 2023    INR 1.50 2023       Anticoagulation Summary  As of 2023      INR goal:  2.0-3.0   TTR:  54.3 % (1.2 y)   INR used for dosin.1 (2023)   Warfarin maintenance plan:  10 mg (10 mg x 1) every Mon, Wed, Fri; 5 mg (10 mg x 0.5) all other days   Weekly warfarin total:  50 mg   Plan last modified:  [address] [facility] (2022)   Next INR check:  2023   Priority:  Maintenance   Target end date: Indefinite    Indications    History of DVT (deep vein thrombosis) [Z86.718]                 Anticoagulation Episode Summary       INR check location:      Preferred lab:      Send INR reminders to:  [facility] STAFF    Comments:  [facility]          Anticoagulation Care Providers       Provider Role Specialty Phone number    Amirah Parker MD Referring Family Medicine [phone]          There were no vitals taken for this visit. Warfarin assessment / plan:     Appears well  Supra-therapeutic INR. Decrease in vitamin K intake. See in 4 weeks    Description    Hold 2 days then  CONTINUE: Warfarin 5mg (1/2 tablet) daily EXCEPT 10mg (1 tablet) on Monday, Wednesday and Friday    Call right away with any signs or symptoms of bleeding especially while taking Meloxicam. Meloxicam 3/2/23 for 30 days    Call [phone] with signs or symptoms of bleeding or ANY medication changes (including antibiotics, steroids, over-the-counter medications or herbal supplements).        If significant bleeding occurs or if you fall and hit

## 2023-09-01 ENCOUNTER — ANTI-COAG VISIT (OUTPATIENT)
Dept: PHARMACY | Age: 58
End: 2023-09-01
Payer: COMMERCIAL

## 2023-09-01 DIAGNOSIS — I10 ESSENTIAL HYPERTENSION: ICD-10-CM

## 2023-09-01 DIAGNOSIS — Z86.718 HISTORY OF DVT (DEEP VEIN THROMBOSIS): Primary | ICD-10-CM

## 2023-09-01 LAB — INTERNATIONAL NORMALIZATION RATIO, POC: 3.3

## 2023-09-01 PROCEDURE — 99212 OFFICE O/P EST SF 10 MIN: CPT

## 2023-09-01 PROCEDURE — 85610 PROTHROMBIN TIME: CPT

## 2023-09-01 RX ORDER — AMLODIPINE BESYLATE 10 MG/1
TABLET ORAL
Qty: 30 TABLET | Refills: 1 | Status: SHIPPED | OUTPATIENT
Start: 2023-09-01

## 2023-09-01 NOTE — PROGRESS NOTES
Rashmi Michael is a 56 y.o. here for warfarin management. Ada Warren had an INR test today. Results were reviewed and appropriate warfarin management was completed. Patient verifies current warfarin dosing regimen: Yes     Warfarin medication reviewed and updated on the patient 's home medication list: Yes   All other medications reviewed and updated on the patient 's home medication list: Yes     Lab Results   Component Value Date    INR 3.3 09/01/2023    INR 4.1 08/04/2023    INR 2.8 07/07/2023     Patient Findings       Positives:  Change in medications    Negatives:  Signs/symptoms of thrombosis, Signs/symptoms of bleeding, Change in diet/appetite, Bruising    Comments:  Changed from Excedrin with aspirin to Excedrin to tylenol and no aspirin. Anticoagulation Summary  As of 9/1/2023      INR goal:  2.0-3.0   TTR:  51.0 % (1.3 y)   INR used for dosing:  3.3 (9/1/2023)   Warfarin maintenance plan:  10 mg (10 mg x 1) every Sun, Wed; 5 mg (10 mg x 0.5) all other days   Weekly warfarin total:  45 mg   Plan last modified:  Fernando Abernathy, [facility] (9/1/2023)   Next INR check:  9/29/2023   Priority:  Maintenance   Target end date: Indefinite    Indications    History of DVT (deep vein thrombosis) [Z86.718]                 Anticoagulation Episode Summary       INR check location:      Preferred lab:      Send INR reminders to:  [facility]AFF    Comments:  [facility]""          Anticoagulation Care Providers       Provider Role Specialty Phone number    Sandie Danielle MD Referring Family Medicine [phone]          There were no vitals taken for this visit. Warfarin assessment / plan:     Appears well  Supra-therapeutic INR. Denies signs and symptoms of bleeding/bruising. Denies change in appetite. Denies decrease in vitamin K intake. Mimi Oakes had an elevated INR last visit. Was taking Excedrin at that time with aspirin. Switched to a product with tylenol versus aspirin.  He was already taking

## 2023-09-06 DIAGNOSIS — E11.65 UNCONTROLLED TYPE 2 DIABETES MELLITUS WITH HYPERGLYCEMIA (HCC): ICD-10-CM

## 2023-09-06 RX ORDER — METFORMIN HYDROCHLORIDE 500 MG/1
TABLET, EXTENDED RELEASE ORAL
Qty: 60 TABLET | Refills: 2 | Status: SHIPPED | OUTPATIENT
Start: 2023-09-06

## 2023-09-15 ENCOUNTER — OFFICE VISIT (OUTPATIENT)
Dept: FAMILY MEDICINE CLINIC | Age: 58
End: 2023-09-15
Payer: COMMERCIAL

## 2023-09-15 VITALS
RESPIRATION RATE: 16 BRPM | SYSTOLIC BLOOD PRESSURE: 116 MMHG | OXYGEN SATURATION: 98 % | WEIGHT: 211 LBS | BODY MASS INDEX: 25.69 KG/M2 | DIASTOLIC BLOOD PRESSURE: 78 MMHG | HEART RATE: 103 BPM | HEIGHT: 76 IN

## 2023-09-15 DIAGNOSIS — E78.2 MIXED HYPERLIPIDEMIA: ICD-10-CM

## 2023-09-15 DIAGNOSIS — I10 ESSENTIAL HYPERTENSION: ICD-10-CM

## 2023-09-15 DIAGNOSIS — M54.2 CHRONIC NECK PAIN: ICD-10-CM

## 2023-09-15 DIAGNOSIS — E11.9 TYPE 2 DIABETES MELLITUS WITHOUT COMPLICATION, WITHOUT LONG-TERM CURRENT USE OF INSULIN (HCC): Primary | ICD-10-CM

## 2023-09-15 DIAGNOSIS — J30.2 SEASONAL ALLERGIES: ICD-10-CM

## 2023-09-15 DIAGNOSIS — G89.29 CHRONIC NECK PAIN: ICD-10-CM

## 2023-09-15 PROBLEM — E11.65 UNCONTROLLED TYPE 2 DIABETES MELLITUS WITH HYPERGLYCEMIA (HCC): Status: RESOLVED | Noted: 2023-05-03 | Resolved: 2023-09-15

## 2023-09-15 PROBLEM — R73.03 PREDIABETES: Status: RESOLVED | Noted: 2018-07-13 | Resolved: 2023-09-15

## 2023-09-15 LAB — HBA1C MFR BLD: 8.2 %

## 2023-09-15 PROCEDURE — 3052F HG A1C>EQUAL 8.0%<EQUAL 9.0%: CPT | Performed by: FAMILY MEDICINE

## 2023-09-15 PROCEDURE — 3074F SYST BP LT 130 MM HG: CPT | Performed by: FAMILY MEDICINE

## 2023-09-15 PROCEDURE — 83036 HEMOGLOBIN GLYCOSYLATED A1C: CPT | Performed by: FAMILY MEDICINE

## 2023-09-15 PROCEDURE — 3078F DIAST BP <80 MM HG: CPT | Performed by: FAMILY MEDICINE

## 2023-09-15 PROCEDURE — 99214 OFFICE O/P EST MOD 30 MIN: CPT | Performed by: FAMILY MEDICINE

## 2023-09-15 RX ORDER — FLUTICASONE PROPIONATE 50 MCG
1 SPRAY, SUSPENSION (ML) NASAL DAILY
Qty: 32 G | Refills: 1 | Status: SHIPPED | OUTPATIENT
Start: 2023-09-15

## 2023-09-15 RX ORDER — PROMETHAZINE HYDROCHLORIDE AND CODEINE PHOSPHATE 6.25; 1 MG/5ML; MG/5ML
5 SOLUTION ORAL EVERY 4 HOURS PRN
Qty: 280 ML | Refills: 0 | Status: CANCELLED | OUTPATIENT
Start: 2023-09-15 | End: 2023-09-25

## 2023-09-15 RX ORDER — HYDROCODONE BITARTRATE AND ACETAMINOPHEN 5; 325 MG/1; MG/1
1 TABLET ORAL EVERY 6 HOURS PRN
Qty: 20 TABLET | Refills: 0 | Status: CANCELLED | OUTPATIENT
Start: 2023-09-15 | End: 2023-09-20

## 2023-09-28 ENCOUNTER — OFFICE VISIT (OUTPATIENT)
Dept: PAIN MANAGEMENT | Age: 58
End: 2023-09-28
Payer: COMMERCIAL

## 2023-09-28 VITALS
OXYGEN SATURATION: 100 % | HEART RATE: 96 BPM | HEIGHT: 76 IN | BODY MASS INDEX: 26.25 KG/M2 | DIASTOLIC BLOOD PRESSURE: 82 MMHG | SYSTOLIC BLOOD PRESSURE: 121 MMHG | WEIGHT: 215.6 LBS

## 2023-09-28 DIAGNOSIS — M47.812 CERVICAL SPONDYLOSIS: ICD-10-CM

## 2023-09-28 DIAGNOSIS — M54.12 CERVICAL RADICULOPATHY: ICD-10-CM

## 2023-09-28 DIAGNOSIS — M96.1 FAILED BACK SURGICAL SYNDROME: Primary | ICD-10-CM

## 2023-09-28 DIAGNOSIS — G89.4 CHRONIC PAIN SYNDROME: ICD-10-CM

## 2023-09-28 DIAGNOSIS — Z51.81 ENCOUNTER FOR THERAPEUTIC DRUG MONITORING: ICD-10-CM

## 2023-09-28 PROCEDURE — 3074F SYST BP LT 130 MM HG: CPT | Performed by: NURSE PRACTITIONER

## 2023-09-28 PROCEDURE — 3078F DIAST BP <80 MM HG: CPT | Performed by: NURSE PRACTITIONER

## 2023-09-28 PROCEDURE — 99244 OFF/OP CNSLTJ NEW/EST MOD 40: CPT | Performed by: NURSE PRACTITIONER

## 2023-09-28 RX ORDER — SILDENAFIL 100 MG/1
TABLET, FILM COATED ORAL
COMMUNITY
Start: 2023-09-26

## 2023-09-28 RX ORDER — CYCLOBENZAPRINE HCL 100 %
2 POWDER (GRAM) MISCELLANEOUS 2 TIMES DAILY PRN
Qty: 60 G | Refills: 5 | Status: SHIPPED | OUTPATIENT
Start: 2023-09-28 | End: 2023-10-28

## 2023-09-28 RX ORDER — HYDROCODONE BITARTRATE AND ACETAMINOPHEN 7.5; 325 MG/1; MG/1
1 TABLET ORAL EVERY 8 HOURS PRN
Qty: 84 TABLET | Refills: 0 | Status: SHIPPED | OUTPATIENT
Start: 2023-09-28 | End: 2023-10-26

## 2023-09-28 RX ORDER — AMITRIPTYLINE HYDROCHLORIDE 10 MG/1
TABLET, FILM COATED ORAL
Qty: 90 TABLET | Refills: 1 | Status: SHIPPED | OUTPATIENT
Start: 2023-09-28
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
## 2023-09-29 ENCOUNTER — ANTI-COAG VISIT (OUTPATIENT)
Dept: PHARMACY | Age: 58
End: 2023-09-29
Payer: COMMERCIAL

## 2023-09-29 DIAGNOSIS — Z86.718 HISTORY OF DVT (DEEP VEIN THROMBOSIS): Primary | ICD-10-CM

## 2023-09-29 LAB — INR BLD: 4

## 2023-09-29 PROCEDURE — 99211 OFF/OP EST MAY X REQ PHY/QHP: CPT

## 2023-09-29 PROCEDURE — 85610 PROTHROMBIN TIME: CPT

## 2023-09-29 NOTE — PROGRESS NOTES
Will continue with same dose as this was recently lowered.  Will try adding back in greens before lowering further

## 2023-09-29 NOTE — PROGRESS NOTES
Lashon Galaviz is a 56 y.o. here for warfarin management. Juan José Lira had an INR test today. Results were reviewed and appropriate warfarin management was completed. Patient verifies current warfarin dosing regimen: Yes     Warfarin medication reviewed and updated on the patient 's home medication list: Yes   All other medications reviewed and updated on the patient 's home medication list: Yes     Lab Results   Component Value Date    INR 4.00 2023    INR 3.3 2023    INR 4.1 2023     Patient Findings       Positives:  Change in diet/appetite    Negatives:  Signs/symptoms of thrombosis, Signs/symptoms of bleeding, Missed doses, Change in medications, Bruising    Comments:  Has not been eating as many greens since we last saw him           Anticoagulation Summary  As of 2023      INR goal:  2.0-3.0   TTR:  48.4 % (1.4 y)   INR used for dosin.00 (2023)   Warfarin maintenance plan:  10 mg (10 mg x 1) every Sun, Wed; 5 mg (10 mg x 0.5) all other days   Weekly warfarin total:  45 mg   Plan last modified:  [facility]ri[facility] (2023)   Next INR check:  10/13/2023   Priority:  Maintenance   Target end date: Indefinite    Indications    History of DVT (deep vein thrombosis) [Z86.718]                 Anticoagulation Episode Summary       INR check location:      Preferred lab:      Send INR reminders to:  [facility] STAFF    [facility]          Anticoagulation Care Providers       Provider Role Specialty Phone number    Pawel Wray MD Referring Family Medicine [phone]          There were no vitals taken for this visit. Warfarin assessment / plan:     Appears well  Supra-therapeutic INR. Denies signs and symptoms of bleeding/bruising. Denies medication changes. Denies extra warfarin doses. Decrease in vitamin K intake.     Patient has been not eating as many vitamin K containing foods as he has been since we last saw him, which could explain his increase

## 2023-09-30 DIAGNOSIS — E78.2 MIXED HYPERLIPIDEMIA: ICD-10-CM

## 2023-10-02 RX ORDER — PRAVASTATIN SODIUM 20 MG
20 TABLET ORAL DAILY
Qty: 90 TABLET | Refills: 1 | Status: SHIPPED | OUTPATIENT
Start: 2023-10-02

## 2023-10-10 PROBLEM — Z51.81 ENCOUNTER FOR THERAPEUTIC DRUG MONITORING: Status: ACTIVE | Noted: 2023-10-10

## 2023-10-10 PROBLEM — M47.812 CERVICAL SPONDYLOSIS: Status: ACTIVE | Noted: 2023-10-10

## 2023-10-10 PROBLEM — M54.12 CERVICAL RADICULOPATHY: Status: ACTIVE | Noted: 2023-10-10

## 2023-10-10 PROBLEM — M96.1 FAILED BACK SURGICAL SYNDROME: Status: ACTIVE | Noted: 2023-10-10

## 2023-10-10 PROBLEM — G89.4 CHRONIC PAIN SYNDROME: Status: ACTIVE | Noted: 2023-10-10

## 2023-10-10 ASSESSMENT — ENCOUNTER SYMPTOMS
SHORTNESS OF BREATH: 0
BACK PAIN: 0
CHEST TIGHTNESS: 0
CONSTIPATION: 0

## 2023-10-13 ENCOUNTER — ANTI-COAG VISIT (OUTPATIENT)
Dept: PHARMACY | Age: 58
End: 2023-10-13
Payer: COMMERCIAL

## 2023-10-13 DIAGNOSIS — Z86.718 HISTORY OF DVT (DEEP VEIN THROMBOSIS): Primary | ICD-10-CM

## 2023-10-13 LAB — INTERNATIONAL NORMALIZATION RATIO, POC: 3.3

## 2023-10-13 PROCEDURE — 85610 PROTHROMBIN TIME: CPT

## 2023-10-13 PROCEDURE — 99211 OFF/OP EST MAY X REQ PHY/QHP: CPT

## 2023-10-13 NOTE — PROGRESS NOTES
Leroy Arias is a 56 y.o. here for warfarin management. Denisse Caal had an INR test today. Results were reviewed and appropriate warfarin management was completed. Patient verifies current warfarin dosing regimen: Yes     Warfarin medication reviewed and updated on the patient 's home medication list: Yes   All other medications reviewed and updated on the patient 's home medication list: Yes     Lab Results   Component Value Date    INR 3.3 10/13/2023    INR 4.00 09/29/2023    INR 3.3 09/01/2023       Anticoagulation Summary  As of 10/13/2023      INR goal:  2.0-3.0   TTR:  47.1 % (1.4 y)   INR used for dosing:  3.3 (10/13/2023)   Warfarin maintenance plan:  10 mg (10 mg x 1) every Sun, Wed; 5 mg (10 mg x 0.5) all other days   Weekly warfarin total:  45 mg   Plan last modified:  Irish Arellano RPH (9/1/2023)   Next INR check:  11/10/2023   Priority:  Maintenance   Target end date: Indefinite    Indications    History of DVT (deep vein thrombosis) [Z86.718]                 Anticoagulation Episode Summary       INR check location:      Preferred lab:      Send INR reminders to:  [facility]ICAL STAFF    Comments:  [facility]          Anticoagulation Care Providers       Provider Role Specialty Phone number    Yolanda Escalante MD Referring Family Medicine [phone]          There were no vitals taken for this visit. Warfarin assessment / plan:     Appears well  Supra-therapeutic INR. Denies signs and symptoms of bleeding/bruising. Denies medication changes. He did increase his vitamin K intake from last visit and INR came down some.      Does not appear to be enough to keep him in goal range, so will lower dose slightly again by 5 mg/week    Recheck in 4 weeks    Description    NEW DOSE: Warfarin 5mg (1/2 tablet) daily EXCEPT 10mg every Wednesday    Call [phone] with signs or symptoms of bleeding or ANY medication changes (including antibiotics, steroids, over-the-counter medications or herbal

## 2023-10-24 ENCOUNTER — OFFICE VISIT (OUTPATIENT)
Dept: PAIN MANAGEMENT | Age: 58
End: 2023-10-24
Payer: COMMERCIAL

## 2023-10-24 VITALS
DIASTOLIC BLOOD PRESSURE: 89 MMHG | SYSTOLIC BLOOD PRESSURE: 138 MMHG | OXYGEN SATURATION: 99 % | BODY MASS INDEX: 26.05 KG/M2 | HEART RATE: 96 BPM | WEIGHT: 214 LBS

## 2023-10-24 DIAGNOSIS — Z51.81 ENCOUNTER FOR THERAPEUTIC DRUG MONITORING: ICD-10-CM

## 2023-10-24 DIAGNOSIS — M47.812 CERVICAL SPONDYLOSIS: ICD-10-CM

## 2023-10-24 DIAGNOSIS — G89.4 CHRONIC PAIN SYNDROME: ICD-10-CM

## 2023-10-24 DIAGNOSIS — M96.1 FAILED BACK SURGICAL SYNDROME: Primary | ICD-10-CM

## 2023-10-24 DIAGNOSIS — M54.12 CERVICAL RADICULOPATHY: ICD-10-CM

## 2023-10-24 PROCEDURE — 3075F SYST BP GE 130 - 139MM HG: CPT | Performed by: NURSE PRACTITIONER

## 2023-10-24 PROCEDURE — 99214 OFFICE O/P EST MOD 30 MIN: CPT | Performed by: NURSE PRACTITIONER

## 2023-10-24 PROCEDURE — 3079F DIAST BP 80-89 MM HG: CPT | Performed by: NURSE PRACTITIONER

## 2023-10-24 RX ORDER — NALOXONE HYDROCHLORIDE 4 MG/.1ML
1 SPRAY NASAL PRN
Qty: 1 EACH | Refills: 0 | Status: SHIPPED | OUTPATIENT
Start: 2023-10-24

## 2023-10-24 RX ORDER — HYDROCODONE BITARTRATE AND ACETAMINOPHEN 7.5; 325 MG/1; MG/1
1 TABLET ORAL EVERY 6 HOURS PRN
Qty: 112 TABLET | Refills: 0 | Status: SHIPPED | OUTPATIENT
Start: 2023-10-24 | End: 2023-11-21

## 2023-10-24 NOTE — PROGRESS NOTES
Brittany Griffith  1965  [id number]      HISTORY OF PRESENT ILLNESS: Mr. Yarelis Joseph is a 56 y.o. male returns for a follow up visit for pain management  He has a diagnosis of   1. Failed back surgical syndrome    2. Encounter for therapeutic drug monitoring    3. Cervical radiculopathy    4. Cervical spondylosis    5. Chronic pain syndrome    . New Medications since Last Office visit have been reviewed with patient. As per Information Obtained from the PADT (Patient Assessment and Documentation Tool)    He complains of pain in the neck. He rates the pain 9/10 and describes it as sharp, aching. Current treatment regimen has helped relieve about 90% of the pain since beginning treatment plan. He denies any side effects from the current pain regimen. Patient reports that since implementation of their treatment plan; their physical functioning is better, family/social relationships are better, mood is better sleep patterns are better, and that the overall functioning is better. Patient denies/admits that any of the above have changed since last office visit. Patient denies misusing/abusing his narcotic pain medications or using any illegal drugs. Upon obtaining medical history from Mr. Yarelis Joseph    ALLERGIES: Patients list of allergies were reviewed     MEDICATIONS: Mr. Yarelis Joseph list of medications were reviewed. His current medications are   Outpatient Medications Prior to Visit   Medication Sig Dispense Refill    pravastatin (PRAVACHOL) 20 MG tablet TAKE 1 TABLET BY MOUTH DAILY 90 tablet 1    sildenafil (VIAGRA) 100 MG tablet       amitriptyline (ELAVIL) 10 MG tablet Take 1-2 tabs QHS as needed 90 tablet 1    HYDROcodone-acetaminophen (NORCO) 7.5-325 MG per tablet Take 1 tablet by mouth every 8 hours as needed for Pain for up to 28 days.  Max Daily Amount: 3 tablets 84 tablet 0    Cyclobenzaprine POWD Apply 2 g topically 2 times daily as needed (pain) Cyclobenzaprine 2%, diclofenac 3%, gabapentin 6%, lidocaine 2%, dimethyl

## 2023-11-21 ENCOUNTER — OFFICE VISIT (OUTPATIENT)
Dept: PAIN MANAGEMENT | Age: 58
End: 2023-11-21
Payer: COMMERCIAL

## 2023-11-21 VITALS
BODY MASS INDEX: 26.17 KG/M2 | OXYGEN SATURATION: 98 % | SYSTOLIC BLOOD PRESSURE: 155 MMHG | HEART RATE: 114 BPM | WEIGHT: 215 LBS | DIASTOLIC BLOOD PRESSURE: 74 MMHG

## 2023-11-21 DIAGNOSIS — M54.12 CERVICAL RADICULOPATHY: ICD-10-CM

## 2023-11-21 DIAGNOSIS — Z51.81 ENCOUNTER FOR THERAPEUTIC DRUG MONITORING: ICD-10-CM

## 2023-11-21 DIAGNOSIS — M96.1 FAILED BACK SURGICAL SYNDROME: Primary | ICD-10-CM

## 2023-11-21 DIAGNOSIS — M47.812 CERVICAL SPONDYLOSIS: ICD-10-CM

## 2023-11-21 DIAGNOSIS — G89.4 CHRONIC PAIN SYNDROME: ICD-10-CM

## 2023-11-21 PROCEDURE — 3078F DIAST BP <80 MM HG: CPT | Performed by: NURSE PRACTITIONER

## 2023-11-21 PROCEDURE — 3077F SYST BP >= 140 MM HG: CPT | Performed by: NURSE PRACTITIONER

## 2023-11-21 PROCEDURE — 99213 OFFICE O/P EST LOW 20 MIN: CPT | Performed by: NURSE PRACTITIONER

## 2023-11-21 RX ORDER — HYDROCODONE BITARTRATE AND ACETAMINOPHEN 7.5; 325 MG/1; MG/1
1 TABLET ORAL EVERY 6 HOURS PRN
Qty: 112 TABLET | Refills: 0 | Status: SHIPPED | OUTPATIENT
Start: 2023-11-21 | End: 2023-12-19

## 2023-11-21 NOTE — PROGRESS NOTES
tablet 2    amLODIPine (NORVASC) 10 MG tablet TAKE 1 TABLET BY MOUTH DAILY 30 tablet 1    warfarin (COUMADIN) 10 MG tablet Take 5mg daily except 10mg on Monday, Wednesday and Friday or as directed by [facility] [phone] (Patient taking differently: Take 5mg daily except 10mg every Wednesday or as directed by [facility] [phone]) 65 tablet 2    loratadine-pseudoephedrine (CLARITIN-D 12HR) 5-120 MG per extended release tablet Take 1 tablet by mouth daily as needed (allergies)      Acetaminophen-Caffeine (EXCEDRIN TENSION HEADACHE) TABS Take 2 tablets by mouth daily as needed (migraine)      esomeprazole (NEXIUM) 20 MG delayed release capsule Take 1 capsule by mouth daily       No current facility-administered medications for this visit. I will continue his current medication regimen  which is part of the above treatment schedule. It has been helping with Mr. Browning's chronic  medical problems which for this visit include: The primary encounter diagnosis was Failed back surgical syndrome. Diagnoses of Encounter for therapeutic drug monitoring, Cervical radiculopathy, Cervical spondylosis, and Chronic pain syndrome were also pertinent to this visit. Risks and benefits of the medications and other alternative treatments  including no treatment were discussed with the patient. The common side effects of these medications were also explained to the patient. Informed verbal consent was obtained. Goals of current treatment regimen include improvement in pain, restoration of functioning- with focus on improvement in physical performance, general activity, work or disability,emotional distress, health care utilization and  decreased medication consumption. Will continue to monitor progress towards achieving/maintaining therapeutic goals with special emphasis on  1. Improvement in perceived interfernce  of pain with ADL's. Ability to do home exercises independently.  Ability to do

## 2023-11-27 DIAGNOSIS — E11.65 UNCONTROLLED TYPE 2 DIABETES MELLITUS WITH HYPERGLYCEMIA (HCC): ICD-10-CM

## 2023-11-28 RX ORDER — METFORMIN HYDROCHLORIDE 500 MG/1
TABLET, EXTENDED RELEASE ORAL
Qty: 180 TABLET | Refills: 1 | Status: SHIPPED | OUTPATIENT
Start: 2023-11-28

## 2023-12-02 DIAGNOSIS — I10 ESSENTIAL HYPERTENSION: ICD-10-CM

## 2023-12-04 RX ORDER — AMLODIPINE BESYLATE 10 MG/1
TABLET ORAL
Qty: 90 TABLET | Refills: 1 | Status: SHIPPED | OUTPATIENT
Start: 2023-12-04 | End: 2023-12-15

## 2023-12-15 ENCOUNTER — OFFICE VISIT (OUTPATIENT)
Dept: FAMILY MEDICINE CLINIC | Age: 58
End: 2023-12-15
Payer: COMMERCIAL

## 2023-12-15 VITALS
SYSTOLIC BLOOD PRESSURE: 122 MMHG | OXYGEN SATURATION: 98 % | HEIGHT: 76 IN | WEIGHT: 213 LBS | DIASTOLIC BLOOD PRESSURE: 80 MMHG | HEART RATE: 106 BPM | BODY MASS INDEX: 25.94 KG/M2 | RESPIRATION RATE: 16 BRPM

## 2023-12-15 DIAGNOSIS — I10 ESSENTIAL HYPERTENSION: ICD-10-CM

## 2023-12-15 DIAGNOSIS — E11.9 TYPE 2 DIABETES MELLITUS WITHOUT COMPLICATION, WITHOUT LONG-TERM CURRENT USE OF INSULIN (HCC): Primary | ICD-10-CM

## 2023-12-15 DIAGNOSIS — J01.90 ACUTE BACTERIAL SINUSITIS: ICD-10-CM

## 2023-12-15 DIAGNOSIS — E78.2 MIXED HYPERLIPIDEMIA: ICD-10-CM

## 2023-12-15 DIAGNOSIS — B96.89 ACUTE BACTERIAL SINUSITIS: ICD-10-CM

## 2023-12-15 PROBLEM — T46.6X5A ADVERSE EFFECT OF STATIN: Status: ACTIVE | Noted: 2023-12-15

## 2023-12-15 LAB
ALBUMIN SERPL-MCNC: 5 G/DL (ref 3.4–5)
ALBUMIN/GLOB SERPL: 2.8 {RATIO} (ref 1.1–2.2)
ALP SERPL-CCNC: 96 U/L (ref 40–129)
ALT SERPL-CCNC: 21 U/L (ref 10–40)
ANION GAP SERPL CALCULATED.3IONS-SCNC: 12 MMOL/L (ref 3–16)
AST SERPL-CCNC: 20 U/L (ref 15–37)
BILIRUB SERPL-MCNC: <0.2 MG/DL (ref 0–1)
BUN SERPL-MCNC: 11 MG/DL (ref 7–20)
CALCIUM SERPL-MCNC: 9.7 MG/DL (ref 8.3–10.6)
CHLORIDE SERPL-SCNC: 102 MMOL/L (ref 99–110)
CHOLEST SERPL-MCNC: 191 MG/DL (ref 0–199)
CO2 SERPL-SCNC: 26 MMOL/L (ref 21–32)
CREAT SERPL-MCNC: 0.7 MG/DL (ref 0.9–1.3)
CREAT UR-MCNC: 183.8 MG/DL (ref 39–259)
GFR SERPLBLD CREATININE-BSD FMLA CKD-EPI: >60 ML/MIN/{1.73_M2}
GLUCOSE SERPL-MCNC: 184 MG/DL (ref 70–99)
HBA1C MFR BLD: 8.1 %
HDLC SERPL-MCNC: 35 MG/DL (ref 40–60)
LDLC SERPL CALC-MCNC: 103 MG/DL
MICROALBUMIN UR DL<=1MG/L-MCNC: 2.7 MG/DL
MICROALBUMIN/CREAT UR: 14.7 MG/G (ref 0–30)
POTASSIUM SERPL-SCNC: 4.4 MMOL/L (ref 3.5–5.1)
PROT SERPL-MCNC: 6.8 G/DL (ref 6.4–8.2)
SODIUM SERPL-SCNC: 140 MMOL/L (ref 136–145)
TRIGL SERPL-MCNC: 265 MG/DL (ref 0–150)
VIT B12 SERPL-MCNC: 568 PG/ML (ref 211–911)
VLDLC SERPL CALC-MCNC: 53 MG/DL

## 2023-12-15 PROCEDURE — 99214 OFFICE O/P EST MOD 30 MIN: CPT | Performed by: FAMILY MEDICINE

## 2023-12-15 PROCEDURE — 3079F DIAST BP 80-89 MM HG: CPT | Performed by: FAMILY MEDICINE

## 2023-12-15 PROCEDURE — 83036 HEMOGLOBIN GLYCOSYLATED A1C: CPT | Performed by: FAMILY MEDICINE

## 2023-12-15 PROCEDURE — 3074F SYST BP LT 130 MM HG: CPT | Performed by: FAMILY MEDICINE

## 2023-12-15 PROCEDURE — 3052F HG A1C>EQUAL 8.0%<EQUAL 9.0%: CPT | Performed by: FAMILY MEDICINE

## 2023-12-15 RX ORDER — AMOXICILLIN AND CLAVULANATE POTASSIUM 875; 125 MG/1; MG/1
1 TABLET, FILM COATED ORAL 2 TIMES DAILY
Qty: 14 TABLET | Refills: 0 | Status: SHIPPED | OUTPATIENT
Start: 2023-12-15 | End: 2023-12-22

## 2023-12-15 RX ORDER — PRAVASTATIN SODIUM 40 MG
40 TABLET ORAL DAILY
Qty: 90 TABLET | Refills: 1 | Status: SHIPPED | OUTPATIENT
Start: 2023-12-15

## 2023-12-15 RX ORDER — DEXTROMETHORPHAN HYDROBROMIDE AND PROMETHAZINE HYDROCHLORIDE 15; 6.25 MG/5ML; MG/5ML
5 SYRUP ORAL NIGHTLY PRN
Qty: 118 ML | Refills: 0 | Status: SHIPPED | OUTPATIENT
Start: 2023-12-15

## 2023-12-15 RX ORDER — OLMESARTAN MEDOXOMIL 20 MG/1
20 TABLET ORAL DAILY
Qty: 90 TABLET | Refills: 1 | Status: SHIPPED | OUTPATIENT
Start: 2023-12-15

## 2023-12-15 NOTE — PROGRESS NOTES
Please let Gokul Zapata know that his blood work showed protein leakage from the kidneys from the diabetes. Based on this, we need to switch his blood pressure medicine to 1 that we will protect his kidneys. I have sent this to his pharmacy. So he would be starting olmesartan and stopping amlodipine.   Recommend rechecking kidney function 1 to 2 weeks after starting this and an order is in place at the pharmacy for this    His cholesterol level is improving but has room for more improvement so I am sending in a higher dose of the pravastatin    Normal liver function and normal vitamin B12

## 2024-01-11 ENCOUNTER — TELEPHONE (OUTPATIENT)
Dept: PHARMACY | Age: 59
End: 2024-01-11

## 2024-01-11 NOTE — TELEPHONE ENCOUNTER
Left message to please return our call to reschedule a f/u for warfarin management.    Irish Arellano, PharmD  [facility]  Anticoagulation  [phone]    For Pharmacy Admin Tracking Only    Intervention Detail:   Total # of Interventions Recommended:   Total # of Interventions Accepted:   Time Spent (min): 5

## 2024-01-15 ENCOUNTER — TELEPHONE (OUTPATIENT)
Dept: PAIN MANAGEMENT | Age: 59
End: 2024-01-15

## 2024-01-15 DIAGNOSIS — M54.12 CERVICAL RADICULOPATHY: ICD-10-CM

## 2024-01-15 DIAGNOSIS — M47.812 CERVICAL SPONDYLOSIS: ICD-10-CM

## 2024-01-15 DIAGNOSIS — M96.1 FAILED BACK SURGICAL SYNDROME: ICD-10-CM

## 2024-01-15 DIAGNOSIS — Z51.81 ENCOUNTER FOR THERAPEUTIC DRUG MONITORING: ICD-10-CM

## 2024-01-15 RX ORDER — HYDROCODONE BITARTRATE AND ACETAMINOPHEN 7.5; 325 MG/1; MG/1
1 TABLET ORAL EVERY 6 HOURS PRN
Qty: 28 TABLET | Refills: 0 | Status: SHIPPED | OUTPATIENT
Start: 2024-01-15 | End: 2024-01-22

## 2024-01-18 ENCOUNTER — ANTI-COAG VISIT (OUTPATIENT)
Dept: PHARMACY | Age: 59
End: 2024-01-18
Payer: COMMERCIAL

## 2024-01-18 DIAGNOSIS — Z86.718 HISTORY OF DVT (DEEP VEIN THROMBOSIS): Primary | ICD-10-CM

## 2024-01-18 LAB — INTERNATIONAL NORMALIZATION RATIO, POC: 5.1

## 2024-01-18 NOTE — PROGRESS NOTES
Don Browning is a 58 y.o. here for warfarin management.  Don had an INR test today. Results were reviewed and appropriate warfarin management was completed.     Patient verifies current warfarin dosing regimen: Yes     Warfarin medication reviewed and updated on the patient 's home medication list: Yes   All other medications reviewed and updated on the patient 's home medication list: Yes     Lab Results   Component Value Date    INR 5.1 2024    INR 3.3 10/13/2023    INR 4.00 2023       Anticoagulation Summary  As of 2024      INR goal:  2.0-3.0   TTR:  39.6 % (1.7 y)   INR used for dosin.1 (2024)   Warfarin maintenance plan:  10 mg (10 mg x 1) every Wed; 5 mg (10 mg x 0.5) all other days   Weekly warfarin total:  40 mg   Plan last modified:  Anastacia Ibarra RP (2024)   Next INR check:  2024   Priority:  Maintenance   Target end date:  Indefinite    Indications    History of DVT (deep vein thrombosis) [Z86.718]                 Anticoagulation Episode Summary       INR check location:      Preferred lab:      Send INR reminders to:  [facility]GEMENT CLINICAL STAFF    Comments:  [facility]          Anticoagulation Care Providers       Provider Role Specialty Phone number    Ann, Cole BECERRA MD Referring Family Medicine [phone]          There were no vitals taken for this visit.    Warfarin assessment / plan:     Appears well  Supra-therapeutic INR.     Denies signs and symptoms of bleeding/bruising.  Denies medication changes.  Denies extra warfarin doses.  Denies increased alcohol intake.  Denies change in appetite.  Denies illness, fever, vomiting or diarrhea.  Denies recent hospitalization / ED visit  Denies decrease in vitamin K intake.  Denies cranberry or grapefruit juice intake.  Denies changes in physical activity.  Denies changes in smoking habits.   Denies fluid retention.  Denies recent falls / injuries     Patient thinks he may have had slightly less vitamin K

## 2024-01-19 PROCEDURE — 85610 PROTHROMBIN TIME: CPT | Performed by: SPEECH-LANGUAGE PATHOLOGIST

## 2024-01-19 PROCEDURE — 99211 OFF/OP EST MAY X REQ PHY/QHP: CPT | Performed by: SPEECH-LANGUAGE PATHOLOGIST

## 2024-01-23 ENCOUNTER — OFFICE VISIT (OUTPATIENT)
Dept: PAIN MANAGEMENT | Age: 59
End: 2024-01-23
Payer: COMMERCIAL

## 2024-01-23 VITALS
HEART RATE: 94 BPM | BODY MASS INDEX: 26.41 KG/M2 | OXYGEN SATURATION: 99 % | SYSTOLIC BLOOD PRESSURE: 132 MMHG | DIASTOLIC BLOOD PRESSURE: 76 MMHG | WEIGHT: 217 LBS

## 2024-01-23 DIAGNOSIS — M54.12 CERVICAL RADICULOPATHY: ICD-10-CM

## 2024-01-23 DIAGNOSIS — G89.4 CHRONIC PAIN SYNDROME: ICD-10-CM

## 2024-01-23 DIAGNOSIS — M96.1 FAILED BACK SURGICAL SYNDROME: Primary | ICD-10-CM

## 2024-01-23 DIAGNOSIS — Z51.81 ENCOUNTER FOR THERAPEUTIC DRUG MONITORING: ICD-10-CM

## 2024-01-23 DIAGNOSIS — M47.812 CERVICAL SPONDYLOSIS: ICD-10-CM

## 2024-01-23 PROCEDURE — 3075F SYST BP GE 130 - 139MM HG: CPT | Performed by: NURSE PRACTITIONER

## 2024-01-23 PROCEDURE — 3078F DIAST BP <80 MM HG: CPT | Performed by: NURSE PRACTITIONER

## 2024-01-23 PROCEDURE — 99213 OFFICE O/P EST LOW 20 MIN: CPT | Performed by: NURSE PRACTITIONER

## 2024-01-23 RX ORDER — HYDROCODONE BITARTRATE AND ACETAMINOPHEN 7.5; 325 MG/1; MG/1
1 TABLET ORAL EVERY 6 HOURS PRN
Qty: 112 TABLET | Refills: 0 | Status: SHIPPED | OUTPATIENT
Start: 2024-01-23 | End: 2024-02-20

## 2024-01-23 NOTE — PROGRESS NOTES
Service [phone] (Patient taking differently: Take 5mg daily except 10mg every Wednesday or as directed by [facility] [phone]) 65 tablet 2    loratadine-pseudoephedrine (CLARITIN-D 12HR) 5-120 MG per extended release tablet Take 1 tablet by mouth daily as needed (allergies)      Acetaminophen-Caffeine (EXCEDRIN TENSION HEADACHE) TABS Take 2 tablets by mouth daily as needed (migraine)      esomeprazole (NEXIUM) 20 MG delayed release capsule Take 1 capsule by mouth daily       No current facility-administered medications for this visit.     I will continue his current medication regimen  which is part of the above treatment schedule. It has been helping with Mr. Browning's chronic  medical problems which for this visit include:   The primary encounter diagnosis was Failed back surgical syndrome. Diagnoses of Encounter for therapeutic drug monitoring, Cervical radiculopathy, Cervical spondylosis, and Chronic pain syndrome were also pertinent to this visit.   Risks and benefits of the medications and other alternative treatments  including no treatment were discussed with the patient.The common side effects of these medications were also explained to the patient.  Informed verbal consent was obtained.   Goals of current treatment regimen include improvement in pain, restoration of functioning- with focus on improvement in physical performance, general activity, work or disability,emotional distress, health care utilization and  decreased medication consumption. Will continue to monitor progress towards achieving/maintaining therapeutic goals with special emphasis on  1. Improvement in perceived interfernce  of pain with ADL's. Ability to do home exercises independently. Ability to do household chores indoor and/or outdoor work and social and leisure activities.Improve psychosocial and physical functioning. he is showing progression towards this treatment goal with the current regimen.   He was advised

## 2024-01-29 ENCOUNTER — OFFICE VISIT (OUTPATIENT)
Dept: FAMILY MEDICINE CLINIC | Age: 59
End: 2024-01-29
Payer: COMMERCIAL

## 2024-01-29 VITALS
HEIGHT: 76 IN | OXYGEN SATURATION: 97 % | DIASTOLIC BLOOD PRESSURE: 80 MMHG | BODY MASS INDEX: 25.69 KG/M2 | TEMPERATURE: 98 F | WEIGHT: 211 LBS | RESPIRATION RATE: 16 BRPM | SYSTOLIC BLOOD PRESSURE: 150 MMHG | HEART RATE: 98 BPM

## 2024-01-29 DIAGNOSIS — R09.82 POSTNASAL DRIP: ICD-10-CM

## 2024-01-29 DIAGNOSIS — R09.81 NASAL CONGESTION: Primary | ICD-10-CM

## 2024-01-29 DIAGNOSIS — R05.9 COUGH, UNSPECIFIED TYPE: ICD-10-CM

## 2024-01-29 LAB
INFLUENZA A ANTIGEN, POC: NEGATIVE
INFLUENZA B ANTIGEN, POC: NEGATIVE
LOT EXPIRE DATE: NORMAL
LOT KIT NUMBER: NORMAL
SARS-COV-2, POC: NORMAL
VALID INTERNAL CONTROL: NORMAL
VENDOR AND KIT NAME POC: NORMAL

## 2024-01-29 PROCEDURE — 87428 SARSCOV & INF VIR A&B AG IA: CPT | Performed by: INTERNAL MEDICINE

## 2024-01-29 PROCEDURE — 99213 OFFICE O/P EST LOW 20 MIN: CPT | Performed by: FAMILY MEDICINE

## 2024-01-29 PROCEDURE — 3079F DIAST BP 80-89 MM HG: CPT | Performed by: FAMILY MEDICINE

## 2024-01-29 PROCEDURE — 3077F SYST BP >= 140 MM HG: CPT | Performed by: FAMILY MEDICINE

## 2024-01-29 RX ORDER — DEXTROMETHORPHAN HYDROBROMIDE AND PROMETHAZINE HYDROCHLORIDE 15; 6.25 MG/5ML; MG/5ML
5 SYRUP ORAL 4 TIMES DAILY PRN
Qty: 118 ML | Refills: 0 | Status: SHIPPED | OUTPATIENT
Start: 2024-01-29 | End: 2024-02-05

## 2024-01-29 RX ORDER — GUAIFENESIN 600 MG/1
600 TABLET, EXTENDED RELEASE ORAL 2 TIMES DAILY
Qty: 20 TABLET | Refills: 0 | Status: SHIPPED | OUTPATIENT
Start: 2024-01-29 | End: 2024-02-08

## 2024-01-29 ASSESSMENT — PATIENT HEALTH QUESTIONNAIRE - PHQ9
1. LITTLE INTEREST OR PLEASURE IN DOING THINGS: 0
SUM OF ALL RESPONSES TO PHQ QUESTIONS 1-9: 0
2. FEELING DOWN, DEPRESSED OR HOPELESS: 0
SUM OF ALL RESPONSES TO PHQ9 QUESTIONS 1 & 2: 0
SUM OF ALL RESPONSES TO PHQ QUESTIONS 1-9: 0

## 2024-01-29 NOTE — PROGRESS NOTES
1/29/2024    This is a 58 y.o. male   Chief Complaint   Patient presents with    Cough    Congestion     Patient is here for a cough and congestion that has been going on for 3 days.      HPI    Here for concerns for cough and congestion  - going on for about 3 days  - flu and COVID negative  - no fever or chills  - some sinus pressure and discomfort  - taking OTC meds without significant improvement  - no shortness of breath    Review of Systems     Current Outpatient Medications   Medication Sig Dispense Refill    HYDROcodone-acetaminophen (NORCO) 7.5-325 MG per tablet Take 1 tablet by mouth every 6 hours as needed for Pain for up to 28 days. Max Daily Amount: 4 tablets 112 tablet 0    olmesartan (BENICAR) 20 MG tablet Take 1 tablet by mouth daily 90 tablet 1    pravastatin (PRAVACHOL) 40 MG tablet Take 1 tablet by mouth daily 90 tablet 1    metFORMIN (GLUCOPHAGE-XR) 500 MG extended release tablet TAKE 1 TABLET BY MOUTH TWICE DAILY WITH MEALS 180 tablet 1    naloxone 4 MG/0.1ML LIQD nasal spray 1 spray by Nasal route as needed for Opioid Reversal 1 each 0    sildenafil (VIAGRA) 100 MG tablet       fluticasone (FLONASE) 50 MCG/ACT nasal spray 1 spray by Each Nostril route daily 32 g 1    warfarin (COUMADIN) 10 MG tablet Take 5mg daily except 10mg on Monday, Wednesday and Friday or as directed by [facility]mad[facility] [phone] (Patient taking differently: Take 5mg daily except 10mg every Wednesday or [facility] [phone]) 65 tablet 2    loratadine-pseudoephedrine (CLARITIN-D 12HR) 5-120 MG per extended release tablet Take 1 tablet by mouth daily as needed (allergies)      Acetaminophen-Caffeine (EXCEDRIN TENSION HEADACHE) TABS Take 2 tablets by mouth daily as needed (migraine)      esomeprazole (NEXIUM) 20 MG delayed release capsule Take 1 capsule by mouth daily       No current facility-administered medications for this visit.       BP (!) 150/80   Pulse 98   Temp 98 °F (36.7

## 2024-02-14 NOTE — PROGRESS NOTES
Mr. Edith Mckenzie is a 50 y.o.  male. Mr. Edith Mckenzie had an INR test today. Results were reviewed and appropriate warfarin management was completed. THIS VISIT WAS COMPLETED AS:   [x]    A VIRTUAL VISIT VIA TELEPHONE IN EFFORTS TO REDUCE THE SPREAD OF COVID-19.  []    A DRIVE-THRU VISIT IN EFFORTS TO REDUCE THE SPREAD OF COVID-19.  []    AN IN PERSON VISIT. PROTOCOLS WERE FOLLOWED WITH PRECAUTIONS TO REDUCE THE SPREAD OF COVID-19. Patient verifies current dosing regimen: Yes     Warfarin medication reviewed and updated on the patient 's home medication list: Yes   All other medications reviewed and updated on the patient 's home medication list: Yes     Lab Results   Component Value Date    INR 2.93 (H) 2021    INR 2.94 (H) 2021    INR 2.71 (H) 2020           Anticoagulation Summary  As of 2021    INR goal:  2.0-3.0   TTR:  56.4 % (2.7 y)   INR used for dosin.93 (2021)   Warfarin maintenance plan:  10 mg (10 mg x 1) every Sun, u; 5 mg (10 mg x 0.5) all other days   Weekly warfarin total:  45 mg   Plan last modified:  [facility] (2021)   Next INR check:  3/22/2021   Priority:  Maintenance   Target end date:   Indefinite    Indications    Factor 5 Leiden mutation  heterozygous [facility]) [D68.51]             Anticoagulation Episode Summary     INR check location:  Anticoagulation Clinic    Preferred lab:      Send INR reminders to:  [facility]NAGEMENT CLINICAL STAFF    Comments:  [facility]        Anticoagulation Care Providers     Provider Role Specialty Phone number    Samantha Kraus MD Referring Family Medicine [phone]          Warfarin assessment / plan:   Left message on patient answering machine to continue saame dose and call us to set up next visit    Description    CONTINUE: Warfarin 5 mg daily except 10 mg on  and Thursday     INR<2  Blood too thick  INR>3  Blood too thin Keep the number of servings of Vitamin K (dark green vegetables) consistent from week to week         Immunization History   Administered Date(s) Administered    Tdap (Boostrix, Adacel) 01/05/2019       Immunization history reviewed and updated:     Influenza vaccine given:       Reviewed AVS with patient / caregiver.       CLINICAL PHARMACY CONSULT: MED RECONCILIATION/REVIEW ADDENDUM    For Pharmacy Admin Tracking Only    PHSO (orange banner): No  Total # of Interventions Recommended (warfarin changes): 0  (warfarin changes) (other medication updates)- Updated Order #: 0 Updated Order Reason(s):   (#1 for reviewing INR)   Total Interventions Accepted (warfarin related): 0  Time Spent (min) (round up): 15 Statement Selected

## 2024-02-20 ENCOUNTER — OFFICE VISIT (OUTPATIENT)
Dept: PAIN MANAGEMENT | Age: 59
End: 2024-02-20
Payer: COMMERCIAL

## 2024-02-20 VITALS
DIASTOLIC BLOOD PRESSURE: 82 MMHG | BODY MASS INDEX: 25.93 KG/M2 | HEART RATE: 89 BPM | OXYGEN SATURATION: 98 % | WEIGHT: 213 LBS | SYSTOLIC BLOOD PRESSURE: 150 MMHG

## 2024-02-20 DIAGNOSIS — M96.1 FAILED BACK SURGICAL SYNDROME: Primary | ICD-10-CM

## 2024-02-20 DIAGNOSIS — Z51.81 ENCOUNTER FOR THERAPEUTIC DRUG MONITORING: ICD-10-CM

## 2024-02-20 DIAGNOSIS — M54.12 CERVICAL RADICULOPATHY: ICD-10-CM

## 2024-02-20 DIAGNOSIS — G89.4 CHRONIC PAIN SYNDROME: ICD-10-CM

## 2024-02-20 DIAGNOSIS — M47.812 CERVICAL SPONDYLOSIS: ICD-10-CM

## 2024-02-20 PROCEDURE — 3079F DIAST BP 80-89 MM HG: CPT | Performed by: NURSE PRACTITIONER

## 2024-02-20 PROCEDURE — 3077F SYST BP >= 140 MM HG: CPT | Performed by: NURSE PRACTITIONER

## 2024-02-20 PROCEDURE — 99213 OFFICE O/P EST LOW 20 MIN: CPT | Performed by: NURSE PRACTITIONER

## 2024-02-20 RX ORDER — METHYLPREDNISOLONE 4 MG/1
TABLET ORAL
Qty: 21 TABLET | Refills: 0 | Status: SHIPPED | OUTPATIENT
Start: 2024-02-20 | End: 2024-02-26

## 2024-02-20 RX ORDER — HYDROCODONE BITARTRATE AND ACETAMINOPHEN 7.5; 325 MG/1; MG/1
1 TABLET ORAL EVERY 6 HOURS PRN
Qty: 112 TABLET | Refills: 0 | Status: SHIPPED | OUTPATIENT
Start: 2024-02-20 | End: 2024-03-19

## 2024-02-20 NOTE — PROGRESS NOTES
this treatment goal with the current regimen.     He was advised against drinking alcohol with the narcotic pain medicines, advised against driving or handling machinery while adjusting the dose of medicines or if having cognitive  issues related to the current medications.Risk of overdose and death, if medicines not taken as prescribed, were also discussed. If the patient develops new symptoms or if the symptoms worsen, the patient should call the office.    While transcribing every attempt was made to maintain the accuracy of the note in terms of it's contents,there may have been some errors made inadvertently.  Thank you for allowing me to participate in the care of this patient.    Felicitas Lua, NP-C    Cc: , Cole BECERRA MD

## 2024-03-01 ENCOUNTER — ANTI-COAG VISIT (OUTPATIENT)
Dept: PHARMACY | Age: 59
End: 2024-03-01
Payer: COMMERCIAL

## 2024-03-01 DIAGNOSIS — Z86.718 HISTORY OF DVT (DEEP VEIN THROMBOSIS): Primary | ICD-10-CM

## 2024-03-01 LAB — INTERNATIONAL NORMALIZATION RATIO, POC: 4.3

## 2024-03-01 PROCEDURE — 85610 PROTHROMBIN TIME: CPT

## 2024-03-01 PROCEDURE — 99212 OFFICE O/P EST SF 10 MIN: CPT

## 2024-03-01 NOTE — PROGRESS NOTES
still decrease his weekly warfarin dose to 35mg per week.  I instructed to hold warfarin today. Will recheck INR in 2 weeks.     Description    Do not take warfarin today ONLY  THEN NEW DOSE: Warfarin 5mg (1/2 tablet) daily     Call [phone] with signs or symptoms of bleeding or ANY medication changes (including antibiotics, steroids, over-the-counter medications or herbal supplements).       If significant bleeding occurs or if you fall and hit your head, please seek immediate medical attention.    Keep the number of servings of vitamin K containing foods (dark green, leafy vegetables) the same each week. Please call if this changes.    Try and intake 1-2 servings of green leafy vegetables per week     Limit alcohol intake. Please call if this changes.         Immunization History   Administered Date(s) Administered    COVID-19, PFIZER PURPLE top, DILUTE for use, (age 12 y+), 30mcg/0.3mL 2021, 2021    TDaP, ADACEL (age 10y-64y), BOOSTRIX (age 10y+), IM, 0.5mL 2019    Td vaccine (adult) 2015       Orders Placed This Encounter   Procedures    POCT INR     This external order was created through the results console.      No orders of the defined types were placed in this encounter.     Reviewed AVS with patient / caregiver.    Billing Points:  Adjust dosage and/or reconcile meds (fill pill box) </= 5 medications - 2 points  BASIC ASSESSMENT UNCOMPLICATED (including but not limited to: vital signs; physical assessment screening; review of secondary markers like lab results, allergies, home readings; instructions on treatment plan and basic education; assessment of medication list with one med change and compliance) - 2 points     For Pharmacy Admin Tracking Only    Intervention Detail: Adherence Monitorin and Dose Adjustment: 1, reason: Therapy Optimization  Total # of Interventions Recommended: 2  Total # of Interventions Accepted: 2  Time Spent (min): 15

## 2024-03-08 ENCOUNTER — TELEPHONE (OUTPATIENT)
Dept: FAMILY MEDICINE CLINIC | Age: 59
End: 2024-03-08

## 2024-03-08 DIAGNOSIS — E11.9 TYPE 2 DIABETES MELLITUS WITHOUT COMPLICATION, WITHOUT LONG-TERM CURRENT USE OF INSULIN (HCC): ICD-10-CM

## 2024-03-08 DIAGNOSIS — I10 ESSENTIAL HYPERTENSION: ICD-10-CM

## 2024-03-08 RX ORDER — AMLODIPINE BESYLATE 10 MG/1
TABLET ORAL
Qty: 90 TABLET | Refills: 1 | OUTPATIENT
Start: 2024-03-08

## 2024-03-08 RX ORDER — OLMESARTAN MEDOXOMIL 20 MG/1
20 TABLET ORAL DAILY
Qty: 90 TABLET | Refills: 1 | Status: SHIPPED | OUTPATIENT
Start: 2024-03-08

## 2024-03-08 RX ORDER — LOSARTAN POTASSIUM 50 MG/1
50 TABLET ORAL DAILY
Qty: 30 TABLET | OUTPATIENT
Start: 2024-03-08

## 2024-03-08 NOTE — TELEPHONE ENCOUNTER
Patient called back    He is not taking the AMLODIPINE    But needed his LOSARTAN. I put in a request for that    MALIK

## 2024-03-13 RX ORDER — WARFARIN SODIUM 10 MG/1
TABLET ORAL
Qty: 30 TABLET | Refills: 5 | Status: SHIPPED | OUTPATIENT
Start: 2024-03-13

## 2024-03-15 ENCOUNTER — ANTI-COAG VISIT (OUTPATIENT)
Dept: PHARMACY | Age: 59
End: 2024-03-15
Payer: COMMERCIAL

## 2024-03-15 DIAGNOSIS — Z86.718 HISTORY OF DVT (DEEP VEIN THROMBOSIS): Primary | ICD-10-CM

## 2024-03-15 LAB — INTERNATIONAL NORMALIZATION RATIO, POC: 3

## 2024-03-15 PROCEDURE — 85610 PROTHROMBIN TIME: CPT

## 2024-03-15 PROCEDURE — 99211 OFF/OP EST MAY X REQ PHY/QHP: CPT

## 2024-03-15 NOTE — PROGRESS NOTES
If significant bleeding occurs or if you fall and hit your head, please seek immediate medical attention.    Keep the number of servings of vitamin K containing foods (dark green, leafy vegetables) the same each week. Please call if this changes.    Try and intake 1-2 servings of green leafy vegetables per week     Limit alcohol intake. Please call if this changes.         Immunization History   Administered Date(s) Administered    COVID-19, PFIZER PURPLE top, DILUTE for use, (age 12 y+), 30mcg/0.3mL 2021, 2021    TDaP, ADACEL (age 10y-64y), BOOSTRIX (age 10y+), IM, 0.5mL 2019    Td vaccine (adult) 2015       Orders Placed This Encounter   Procedures    POCT INR     This external order was created through the results console.      No orders of the defined types were placed in this encounter.     Reviewed AVS with patient / caregiver.    Billing Points:  BASIC ASSESSMENT UNCOMPLICATED (including but not limited to: vital signs; physical assessment screening; review of secondary markers like lab results, allergies, home readings; instructions on treatment plan and basic education; assessment of medication list with one med change and compliance) - 2 points     For Pharmacy Admin Tracking Only    Intervention Detail: Adherence Monitorin  Total # of Interventions Recommended: 1  Total # of Interventions Accepted: 1  Time Spent (min): 15

## 2024-03-19 ENCOUNTER — OFFICE VISIT (OUTPATIENT)
Dept: PAIN MANAGEMENT | Age: 59
End: 2024-03-19
Payer: COMMERCIAL

## 2024-03-19 ENCOUNTER — TELEPHONE (OUTPATIENT)
Dept: PAIN MANAGEMENT | Age: 59
End: 2024-03-19

## 2024-03-19 DIAGNOSIS — M54.12 CERVICAL RADICULOPATHY: ICD-10-CM

## 2024-03-19 DIAGNOSIS — M96.1 FAILED BACK SURGICAL SYNDROME: Primary | ICD-10-CM

## 2024-03-19 DIAGNOSIS — Z51.81 ENCOUNTER FOR THERAPEUTIC DRUG MONITORING: ICD-10-CM

## 2024-03-19 DIAGNOSIS — M47.812 CERVICAL SPONDYLOSIS: ICD-10-CM

## 2024-03-19 DIAGNOSIS — G89.4 CHRONIC PAIN SYNDROME: ICD-10-CM

## 2024-03-19 PROCEDURE — 3080F DIAST BP >= 90 MM HG: CPT | Performed by: NURSE PRACTITIONER

## 2024-03-19 PROCEDURE — 99214 OFFICE O/P EST MOD 30 MIN: CPT | Performed by: NURSE PRACTITIONER

## 2024-03-19 PROCEDURE — 3077F SYST BP >= 140 MM HG: CPT | Performed by: NURSE PRACTITIONER

## 2024-03-19 RX ORDER — BUPRENORPHINE 7.5 UG/H
1 PATCH TRANSDERMAL WEEKLY
Qty: 4 PATCH | Refills: 0 | Status: SHIPPED | OUTPATIENT
Start: 2024-03-19 | End: 2024-03-22 | Stop reason: SDUPTHER

## 2024-03-19 NOTE — PROGRESS NOTES
leisure activities.Improve psychosocial and physical functioning. he is showing progression towards this treatment goal with the current regimen.   He was advised against drinking alcohol with the narcotic pain medicines, advised against driving or handling machinery while adjusting the dose of medicines or if having cognitive  issues related to the current medications.Risk of overdose and death, if medicines not taken as prescribed, were also discussed. If the patient develops new symptoms or if the symptoms worsen, the patient should call the office.    While transcribing every attempt was made to maintain the accuracy of the note in terms of it's contents,there may have been some errors made inadvertently.  Thank you for allowing me to participate in the care of this patient.    Felicitas Lua, KENNYC    Cc: Cole Crisostomo MD

## 2024-03-19 NOTE — TELEPHONE ENCOUNTER
Submitted PA for Buprenorphine Via [facility]: [id number] STATUS: PENDING.    Follow up done daily; if no decision with in three days we will refax.  If another three days goes by with no decision will call the insurance for status.

## 2024-03-20 ENCOUNTER — TELEPHONE (OUTPATIENT)
Dept: PAIN MANAGEMENT | Age: 59
End: 2024-03-20

## 2024-03-20 VITALS
DIASTOLIC BLOOD PRESSURE: 81 MMHG | HEART RATE: 90 BPM | SYSTOLIC BLOOD PRESSURE: 149 MMHG | BODY MASS INDEX: 26.05 KG/M2 | OXYGEN SATURATION: 97 % | WEIGHT: 214 LBS

## 2024-03-20 DIAGNOSIS — M47.812 CERVICAL SPONDYLOSIS: ICD-10-CM

## 2024-03-20 DIAGNOSIS — Z51.81 ENCOUNTER FOR THERAPEUTIC DRUG MONITORING: ICD-10-CM

## 2024-03-20 DIAGNOSIS — G89.4 CHRONIC PAIN SYNDROME: ICD-10-CM

## 2024-03-20 DIAGNOSIS — M54.12 CERVICAL RADICULOPATHY: ICD-10-CM

## 2024-03-20 DIAGNOSIS — M96.1 FAILED BACK SURGICAL SYNDROME: ICD-10-CM

## 2024-03-20 NOTE — TELEPHONE ENCOUNTER
Reason for refill request: insurance wont pay      Name of medication: buprenorphine       Pharmacy name: [facility] [place], [place] [address]          Phone number: [phone]      Last refill: was sent to pharmacy yesterday      Next appointment: 04/16/2024    Patient confirmed the above pharmacy has their medication in stock        This [facility] has them in stalk

## 2024-03-21 NOTE — TELEPHONE ENCOUNTER
Patient called asking about his script being sent to [facility]. His insurance wont pay for his buprenorphine. His regular pharmacy want's almost $300 for the patches. So patient is asking for the script to be sent to the [facility] cause it's cheaper there.       Please advice

## 2024-03-21 NOTE — TELEPHONE ENCOUNTER
Insurance denied because they prefer to pay for the following medications:    Morphine ER  Methadone  Fentanyl   Oxycontin    They are not an option r/t dangerous side effects. He should attempt an appeal with his insurance company

## 2024-03-22 RX ORDER — BUPRENORPHINE 7.5 UG/H
1 PATCH TRANSDERMAL WEEKLY
Qty: 4 PATCH | Refills: 0 | Status: SHIPPED | OUTPATIENT
Start: 2024-03-22 | End: 2024-04-19

## 2024-03-23 ENCOUNTER — HOSPITAL ENCOUNTER (EMERGENCY)
Age: 59
Discharge: HOME | End: 2024-03-23
Payer: COMMERCIAL

## 2024-03-23 VITALS
SYSTOLIC BLOOD PRESSURE: 157 MMHG | DIASTOLIC BLOOD PRESSURE: 100 MMHG | BODY MASS INDEX: 26.06 KG/M2 | WEIGHT: 214 LBS | OXYGEN SATURATION: 100 % | HEIGHT: 76 IN | HEART RATE: 100 BPM | RESPIRATION RATE: 16 BRPM | TEMPERATURE: 97.7 F

## 2024-03-23 DIAGNOSIS — T50.905A ADVERSE EFFECT OF DRUG, INITIAL ENCOUNTER: ICD-10-CM

## 2024-03-23 DIAGNOSIS — R11.2 NAUSEA AND VOMITING, UNSPECIFIED VOMITING TYPE: Primary | ICD-10-CM

## 2024-03-23 LAB
GLUCOSE BLD-MCNC: 152 MG/DL (ref 70–99)
PERFORMED ON: ABNORMAL

## 2024-03-23 PROCEDURE — 99284 EMERGENCY DEPT VISIT MOD MDM: CPT

## 2024-03-23 PROCEDURE — 96374 THER/PROPH/DIAG INJ IV PUSH: CPT

## 2024-03-23 PROCEDURE — 6360000002 HC RX W HCPCS: Performed by: PHYSICIAN ASSISTANT

## 2024-03-23 RX ORDER — ONDANSETRON 2 MG/ML
4 INJECTION INTRAMUSCULAR; INTRAVENOUS ONCE
Status: COMPLETED | OUTPATIENT
Start: 2024-03-23 | End: 2024-03-23

## 2024-03-23 RX ORDER — ONDANSETRON 4 MG/1
4 TABLET, ORALLY DISINTEGRATING ORAL 3 TIMES DAILY PRN
Qty: 12 TABLET | Refills: 0 | Status: SHIPPED | OUTPATIENT
Start: 2024-03-23

## 2024-03-23 RX ADMIN — ONDANSETRON 4 MG: 2 INJECTION INTRAMUSCULAR; INTRAVENOUS at 18:04

## 2024-03-23 ASSESSMENT — PAIN - FUNCTIONAL ASSESSMENT: PAIN_FUNCTIONAL_ASSESSMENT: NONE - DENIES PAIN

## 2024-03-23 NOTE — ED NOTES
D/C: Order noted for d/c. Pt confirmed d/c paperwork has correct name. Discharge and education instructions reviewed with patient. Teach-back successful pt confirmed he will follow up with PCP.  Pt verbalized understanding and denied questions at this time. No acute distress noted. Patient instructed to follow-up as noted - return to emergency department if symptoms worsen. Patient verbalized understanding. Discharged per EDMD with discharge instructions. Pt discharged to private vehicle. Patient stable upon departure. Provider aware of patient pain at time of discharge.

## 2024-03-23 NOTE — ED TRIAGE NOTES
Pt was transported to ED by EMS. Pt. Stated that he went to the firehouse because he \"wasn't feeling right.\"  Pt. Started butrain patches for pain mgmt yesterday.  The patches are supposed to remain in place for 1 week.  Pt felt okay yesterday but started to feel \"off\" this morning.  Pt. Reports 2 episodes of vomiting.  Pt. Stated that he removed the patch at approximately 11am today.  Pt. Is a type 2 diabetic and is currently taking thinners.

## 2024-03-23 NOTE — ED PROVIDER NOTES
[facility] EMERGENCY DEPARTMENT  EMERGENCY DEPARTMENT ENCOUNTER      Pt Name: Don Browning  MRN: [id number]  Birthdate 1965  Date of evaluation: 3/23/2024  Provider: CHARAN El  PCP: Cole Juarez MD  Note Started: 5:54 PM EDT     The ED Attending Physician was available for consultation but did not see or evaluate this patient.    CHIEF COMPLAINT       Chief Complaint   Patient presents with    Emesis     Pt in by squad from home for emesis that began this morning.  States that he started taking medication patch to stop with using vicodin for pain management yesterday.  Pt is unsure of name of patch.  States began feeling nauseated this morning and has had 2 episodes of emesis.  Pt states that \"he just doesn't feel right\". Took patch off himself this morning.         HISTORY OF PRESENT ILLNESS   (Location, Timing/Onset, Context/Setting, Quality, Duration, Modifying Factors, Severity, Associated Signs and Symptoms)  Note limiting factors.     Don Browning is a 58 y.o. male who presents with complaints of nausea and vomiting.  Patient says he had been on Vicodin pills for several months, then his doctor wanted to wean him off of them.  Last Vicodin was about 6 or 7 days ago.  He was prescribed buprenorphine patches, and he put 1 on his arm yesterday afternoon.  He says he was feeling a little queasy last night but not too bad.  This morning he ate some breakfast and then got sick to his stomach and threw up.  Thought it was getting better but then it happened a second time, so he went to a fire station and was brought to the emergency department.  He denies abdominal pain.  Said he had a bowel movement earlier today and it was normal, no diarrhea or blood.  Right this moment he says he is a little tired but not nauseous.  He denies cough, cold symptoms, fever, difficulty breathing, chest pain.  No other recent medication changes.    Nursing Notes were all reviewed and agreed with or any  mis-transcribed.)    CHARAN El (electronically signed)       Rafi Toscano PA  03/23/24 2998

## 2024-03-25 ENCOUNTER — TELEPHONE (OUTPATIENT)
Dept: PAIN MANAGEMENT | Age: 59
End: 2024-03-25

## 2024-03-25 NOTE — TELEPHONE ENCOUNTER
Medication Name: buprenorphine (BUTRANS) 7.5 MCG/HR PTWK     Symptoms: high blood pressure, vomiting     How long has patient had symptoms: 1 day    Pharmacy name: [facility] [id number] - [facility] [address]     Pharmacy phone #: [phone]       Pt wants to come in and see KAK as soon as possible he stated he started the butrans on Friday and ended up in the ER on Saturday.

## 2024-03-26 NOTE — TELEPHONE ENCOUNTER
I called patient, explained previous notes - He said he stopped using the patch, and thew away the remaining patches.    Patient has been scheduled for tomorrow 3/27/24 @ 10am.

## 2024-03-27 ENCOUNTER — OFFICE VISIT (OUTPATIENT)
Dept: PAIN MANAGEMENT | Age: 59
End: 2024-03-27
Payer: COMMERCIAL

## 2024-03-27 VITALS
WEIGHT: 214 LBS | BODY MASS INDEX: 26.05 KG/M2 | SYSTOLIC BLOOD PRESSURE: 119 MMHG | OXYGEN SATURATION: 98 % | HEART RATE: 95 BPM | DIASTOLIC BLOOD PRESSURE: 72 MMHG

## 2024-03-27 DIAGNOSIS — M96.1 FAILED BACK SURGICAL SYNDROME: Primary | ICD-10-CM

## 2024-03-27 DIAGNOSIS — G89.4 CHRONIC PAIN SYNDROME: ICD-10-CM

## 2024-03-27 DIAGNOSIS — Z51.81 ENCOUNTER FOR THERAPEUTIC DRUG MONITORING: ICD-10-CM

## 2024-03-27 DIAGNOSIS — M54.12 CERVICAL RADICULOPATHY: ICD-10-CM

## 2024-03-27 DIAGNOSIS — M47.812 CERVICAL SPONDYLOSIS: ICD-10-CM

## 2024-03-27 PROCEDURE — 3079F DIAST BP 80-89 MM HG: CPT | Performed by: NURSE PRACTITIONER

## 2024-03-27 PROCEDURE — 3077F SYST BP >= 140 MM HG: CPT | Performed by: NURSE PRACTITIONER

## 2024-03-27 PROCEDURE — 99213 OFFICE O/P EST LOW 20 MIN: CPT | Performed by: NURSE PRACTITIONER

## 2024-03-27 RX ORDER — PREGABALIN 75 MG/1
75 CAPSULE ORAL 2 TIMES DAILY
Qty: 60 CAPSULE | Refills: 0 | Status: SHIPPED | OUTPATIENT
Start: 2024-03-27 | End: 2024-04-26

## 2024-03-27 NOTE — PROGRESS NOTES
improvement in physical performance, general activity, work or disability,emotional distress, health care utilization and  decreased medication consumption. Will continue to monitor progress towards achieving/maintaining therapeutic goals with special emphasis on  1. Improvement in perceived interfernce  of pain with ADL's. Ability to do home exercises independently. Ability to do household chores indoor and/or outdoor work and social and leisure activities.Improve psychosocial and physical functioning. he is showing progression towards this treatment goal with the current regimen.     He was advised against drinking alcohol with the narcotic pain medicines, advised against driving or handling machinery while adjusting the dose of medicines or if having cognitive  issues related to the current medications.Risk of overdose and death, if medicines not taken as prescribed, were also discussed. If the patient develops new symptoms or if the symptoms worsen, the patient should call the office.    While transcribing every attempt was made to maintain the accuracy of the note in terms of it's contents,there may have been some errors made inadvertently.  Thank you for allowing me to participate in the care of this patient.    KENNY LamasC    Cc: Cole Crisostomo MD

## 2024-04-12 ENCOUNTER — ANTI-COAG VISIT (OUTPATIENT)
Dept: PHARMACY | Age: 59
End: 2024-04-12
Payer: COMMERCIAL

## 2024-04-12 DIAGNOSIS — Z86.718 HISTORY OF DVT (DEEP VEIN THROMBOSIS): Primary | ICD-10-CM

## 2024-04-12 LAB — INTERNATIONAL NORMALIZATION RATIO, POC: 1.5

## 2024-04-12 PROCEDURE — 99211 OFF/OP EST MAY X REQ PHY/QHP: CPT | Performed by: SPEECH-LANGUAGE PATHOLOGIST

## 2024-04-12 PROCEDURE — 85610 PROTHROMBIN TIME: CPT | Performed by: SPEECH-LANGUAGE PATHOLOGIST

## 2024-04-12 NOTE — PROGRESS NOTES
Don Browning is a 58 y.o. here for warfarin management.  Don had an INR test today. Results were reviewed and appropriate warfarin management was completed.     Patient verifies current warfarin dosing regimen: Yes     Warfarin medication reviewed and updated on the patient 's home medication list: Yes   All other medications reviewed and updated on the patient 's home medication list: Yes     Lab Results   Component Value Date    INR 1.5 2024    INR 3.0 03/15/2024    INR 4.3 2024       Anticoagulation Summary  As of 2024      INR goal:  2.0-3.0   TTR:  37.4 % (1.9 y)   INR used for dosin.5 (2024)   Warfarin maintenance plan:  5 mg (10 mg x 0.5) every day   Weekly warfarin total:  35 mg   Plan last modified:  Irish Arellano RP (3/1/2024)   Next INR check:  2024   Priority:  Maintenance   Target end date:  Indefinite    Indications    History of DVT (deep vein thrombosis) [Z86.718]                 Anticoagulation Episode Summary       INR check location:      Preferred lab:      Send INR reminders to:  [facility]    Comments:  EP[facility]          Anticoagulation Care Providers       Provider Role Specialty Phone number    Cole Juarez MD Referring Family Medicine [phone]          There were no vitals taken for this visit.    Warfarin assessment / plan:     Appears well  Sub-therapeutic INR        Missed Warfarin dose(s) on the following dates:  and 4/10/24.    Patient states he forgot his dose on  and 4/10, but then he took 10 mg yesterday to \"make up for that\".  He denies any other changes to his diet, activity, or medications.      Patient will take warfarin 10 mg (1 tablet) today only, and then he will resume his prior regimen of warfarin 5 mg daily.  He will return for an INR check in 7-14 days.     Description    Take 10 mg (1 tablet) today only 24, then:  Continue: 5 mg (1/2 tablet) daily    Call [phone] with signs or symptoms of

## 2024-04-19 DIAGNOSIS — R09.81 NASAL CONGESTION: ICD-10-CM

## 2024-04-19 DIAGNOSIS — R05.9 COUGH, UNSPECIFIED TYPE: ICD-10-CM

## 2024-04-19 RX ORDER — DEXTROMETHORPHAN HYDROBROMIDE AND PROMETHAZINE HYDROCHLORIDE 15; 6.25 MG/5ML; MG/5ML
5 SYRUP ORAL 4 TIMES DAILY PRN
Qty: 118 ML | Refills: 0 | OUTPATIENT
Start: 2024-04-19 | End: 2024-04-26

## 2024-04-23 ENCOUNTER — TELEPHONE (OUTPATIENT)
Dept: FAMILY MEDICINE CLINIC | Age: 59
End: 2024-04-23

## 2024-04-23 DIAGNOSIS — R09.81 NASAL CONGESTION: ICD-10-CM

## 2024-04-23 DIAGNOSIS — R05.9 COUGH, UNSPECIFIED TYPE: ICD-10-CM

## 2024-04-23 RX ORDER — DEXTROMETHORPHAN HYDROBROMIDE AND PROMETHAZINE HYDROCHLORIDE 15; 6.25 MG/5ML; MG/5ML
5 SYRUP ORAL 4 TIMES DAILY PRN
Qty: 118 ML | Refills: 0 | OUTPATIENT
Start: 2024-04-23 | End: 2024-04-30

## 2024-04-24 ENCOUNTER — TELEPHONE (OUTPATIENT)
Dept: PAIN MANAGEMENT | Age: 59
End: 2024-04-24

## 2024-04-24 ENCOUNTER — OFFICE VISIT (OUTPATIENT)
Dept: PAIN MANAGEMENT | Age: 59
End: 2024-04-24
Payer: COMMERCIAL

## 2024-04-24 VITALS
BODY MASS INDEX: 25.93 KG/M2 | SYSTOLIC BLOOD PRESSURE: 139 MMHG | DIASTOLIC BLOOD PRESSURE: 73 MMHG | OXYGEN SATURATION: 96 % | HEART RATE: 87 BPM | WEIGHT: 213 LBS

## 2024-04-24 DIAGNOSIS — M96.1 FAILED BACK SURGICAL SYNDROME: Primary | ICD-10-CM

## 2024-04-24 DIAGNOSIS — G89.4 CHRONIC PAIN SYNDROME: ICD-10-CM

## 2024-04-24 DIAGNOSIS — M54.12 CERVICAL RADICULOPATHY: ICD-10-CM

## 2024-04-24 DIAGNOSIS — M47.812 CERVICAL SPONDYLOSIS: ICD-10-CM

## 2024-04-24 DIAGNOSIS — Z51.81 ENCOUNTER FOR THERAPEUTIC DRUG MONITORING: ICD-10-CM

## 2024-04-24 PROCEDURE — 99212 OFFICE O/P EST SF 10 MIN: CPT | Performed by: NURSE PRACTITIONER

## 2024-04-24 PROCEDURE — 3075F SYST BP GE 130 - 139MM HG: CPT | Performed by: NURSE PRACTITIONER

## 2024-04-24 PROCEDURE — 3078F DIAST BP <80 MM HG: CPT | Performed by: NURSE PRACTITIONER

## 2024-04-24 NOTE — PROGRESS NOTES
Service [phone]) 30 tablet 5    olmesartan (BENICAR) 20 MG tablet Take 1 tablet by mouth daily 90 tablet 1    pravastatin (PRAVACHOL) 40 MG tablet Take 1 tablet by mouth daily 90 tablet 1    metFORMIN (GLUCOPHAGE-XR) 500 MG extended release tablet TAKE 1 TABLET BY MOUTH TWICE DAILY WITH MEALS 180 tablet 1    naloxone 4 MG/0.1ML LIQD nasal spray 1 spray by Nasal route as needed for Opioid Reversal 1 each 0    sildenafil (VIAGRA) 100 MG tablet       fluticasone (FLONASE) 50 MCG/ACT nasal spray 1 spray by Each Nostril route daily 32 g 1    loratadine-pseudoephedrine (CLARITIN-D 12HR) 5-120 MG per extended release tablet Take 1 tablet by mouth daily as needed (allergies)      Acetaminophen-Caffeine (EXCEDRIN TENSION HEADACHE) TABS Take 2 tablets by mouth daily as needed (migraine)      esomeprazole (NEXIUM) 20 MG delayed release capsule Take 1 capsule by mouth daily       No facility-administered medications prior to visit.       SOCIAL/FAMILY/PAST MEDICAL HISTORY: Mr. Browning social, family and past medical history was reviewed.     REVIEW OF SYSTEMS:    Respiratory: Negative for apnea, chest tightness and shortness of breath or change in baseline breathing.    Gastrointestinal: Negative for nausea, vomiting, abdominal pain, diarrhea, constipation, blood in stool and abdominal distention.       PHYSICAL EXAM:   Nursing note and vitals reviewed. /73   Pulse 87   Wt 96.6 kg (213 lb)   SpO2 96%   BMI 25.93 kg/m²   Constitutional: He appears well-developed and well-nourished. No acute distress.   Skin: Skin is warm and dry, good turgor. No rash noted. He is not diaphoretic.  Cardiovascular: Normal rate, regular rhythm, normal heart sounds, and does not have murmur.     Pulmonary/Chest: Effort normal. No respiratory distress. He does not have wheezes in the lung fields. He has no rales.     Neurological/Psychiatric:He is alert and oriented to person, place, and time. Coordination is  normal.  His mood isAppropriate

## 2024-04-24 NOTE — TELEPHONE ENCOUNTER
Spoke to patient and yes he said he has a sinus infection and has a lot of drainage at night

## 2024-04-24 NOTE — TELEPHONE ENCOUNTER
Pt needs the three last office notes and any imaging sent to the pain management that [facility] is referring him to. He could not provide the clinic name because he forgot. Also wants to let [facility] know the clinic does not use [facility] so everything will need to be faxed.    FAX: [phone]

## 2024-04-25 NOTE — TELEPHONE ENCOUNTER
I called patient, explained previous notes - He voiced understanding.    He will stop by the [place] office tomorrow to complete the form.

## 2024-04-26 ENCOUNTER — OFFICE VISIT (OUTPATIENT)
Dept: FAMILY MEDICINE CLINIC | Age: 59
End: 2024-04-26
Payer: COMMERCIAL

## 2024-04-26 ENCOUNTER — ANTI-COAG VISIT (OUTPATIENT)
Dept: PHARMACY | Age: 59
End: 2024-04-26
Payer: COMMERCIAL

## 2024-04-26 VITALS
OXYGEN SATURATION: 98 % | BODY MASS INDEX: 25.37 KG/M2 | HEART RATE: 92 BPM | TEMPERATURE: 98.3 F | RESPIRATION RATE: 18 BRPM | DIASTOLIC BLOOD PRESSURE: 90 MMHG | WEIGHT: 208.4 LBS | SYSTOLIC BLOOD PRESSURE: 134 MMHG

## 2024-04-26 DIAGNOSIS — Z86.718 HISTORY OF DVT (DEEP VEIN THROMBOSIS): ICD-10-CM

## 2024-04-26 DIAGNOSIS — Z86.718 HISTORY OF DVT (DEEP VEIN THROMBOSIS): Primary | ICD-10-CM

## 2024-04-26 DIAGNOSIS — J01.91 ACUTE RECURRENT SINUSITIS, UNSPECIFIED LOCATION: Primary | ICD-10-CM

## 2024-04-26 PROBLEM — N30.00 ACUTE CYSTITIS WITHOUT HEMATURIA: Status: RESOLVED | Noted: 2023-04-29 | Resolved: 2024-04-26

## 2024-04-26 LAB — INTERNATIONAL NORMALIZATION RATIO, POC: 1.7

## 2024-04-26 PROCEDURE — 99212 OFFICE O/P EST SF 10 MIN: CPT

## 2024-04-26 PROCEDURE — 3080F DIAST BP >= 90 MM HG: CPT | Performed by: FAMILY MEDICINE

## 2024-04-26 PROCEDURE — 85610 PROTHROMBIN TIME: CPT

## 2024-04-26 PROCEDURE — 3075F SYST BP GE 130 - 139MM HG: CPT | Performed by: FAMILY MEDICINE

## 2024-04-26 PROCEDURE — 99213 OFFICE O/P EST LOW 20 MIN: CPT | Performed by: FAMILY MEDICINE

## 2024-04-26 RX ORDER — WARFARIN SODIUM 10 MG/1
5 TABLET ORAL DAILY
Qty: 90 TABLET | Refills: 1 | Status: SHIPPED | OUTPATIENT
Start: 2024-04-26

## 2024-04-26 RX ORDER — LORATADINE 10 MG/1
10 TABLET ORAL DAILY
Qty: 90 TABLET | Refills: 0 | Status: SHIPPED | OUTPATIENT
Start: 2024-04-26

## 2024-04-26 RX ORDER — DEXTROMETHORPHAN HYDROBROMIDE AND PROMETHAZINE HYDROCHLORIDE 15; 6.25 MG/5ML; MG/5ML
5 SYRUP ORAL 4 TIMES DAILY PRN
Qty: 118 ML | Refills: 2 | Status: SHIPPED | OUTPATIENT
Start: 2024-04-26

## 2024-04-26 ASSESSMENT — PATIENT HEALTH QUESTIONNAIRE - PHQ9
SUM OF ALL RESPONSES TO PHQ QUESTIONS 1-9: 0
1. LITTLE INTEREST OR PLEASURE IN DOING THINGS: NOT AT ALL
SUM OF ALL RESPONSES TO PHQ9 QUESTIONS 1 & 2: 0
2. FEELING DOWN, DEPRESSED OR HOPELESS: NOT AT ALL

## 2024-04-26 NOTE — PROGRESS NOTES
Don Browning is a 58 y.o. here for warfarin management.  Don had an INR test today. Results were reviewed and appropriate warfarin management was completed.     Patient verifies current warfarin dosing regimen: Yes     Warfarin medication reviewed and updated on the patient 's home medication list: Yes   All other medications reviewed and updated on the patient 's home medication list: No: no changes     Lab Results   Component Value Date    INR 1.7 2024    INR 1.5 2024    INR 3.0 03/15/2024     Patient Findings       Positives:  Change in health    Negatives:  Signs/symptoms of thrombosis, Signs/symptoms of bleeding, Change in medications, Change in diet/appetite, Bruising    Comments:  He believes he has a sinus infection. He sees Dr. Juarez today.  He agrees to call if they add an antibiotic.           Anticoagulation Summary  As of 2024      INR goal:  2.0-3.0   TTR:  36.6 % (1.9 y)   INR used for dosin.7 (2024)   Warfarin maintenance plan:  10 mg (10 mg x 1) every Fri; 5 mg (10 mg x 0.5) all other days   Weekly warfarin total:  40 mg   Plan last modified:  Irish Arellano RP (2024)   Next INR check:  5/10/2024   Priority:  Maintenance   Target end date:  Indefinite    Indications    History of DVT (deep vein thrombosis) [Z86.718]                 Anticoagulation Episode Summary       INR check location:      Preferred lab:      Send INR reminders to:  [facility]    Comments:  EP[facility]          Anticoagulation Care Providers       Provider Role Specialty Phone number    Cole Juarez MD Referring Family Medicine [phone]          There were no vitals taken for this visit.    Warfarin assessment / plan:     Appears well  Sub-therapeutic INR     Denies missed doses.  Denies increased vitamin K intake.  Denies medication changes.  Denies signs or symptoms of clotting.  Denies signs or symptoms of a stroke     Last INR below target range. Instructed to increase

## 2024-04-26 NOTE — PROGRESS NOTES
4/26/2024    This is a 58 y.o. male   Chief Complaint   Patient presents with    Congestion    Cough     Runny nose, sinus pressure, ear, teeth ache.  Still having neck pain.     HPI    Here for concerns for sinus symptoms  - going on for over a week  - sinus pressure, ear pressure, tooth pain, runny nsoe  - some cough present  - historically worse with allergies and seasonal changes  - using flonase  - no fever or chills    Hx of DVT  - needs coumadin refill  - follows at INR clinic for checks  - previously with side effects to Eliquis    Review of Systems     Current Outpatient Medications   Medication Sig Dispense Refill    warfarin (COUMADIN) 10 MG tablet Take 0.5 tablets by mouth daily EXCEPT 10mg every Friday or as directed by [facility] [phone] 90 tablet 1    loratadine (CLARITIN) 10 MG tablet Take 1 tablet by mouth daily 90 tablet 0    promethazine-dextromethorphan (PROMETHAZINE-DM) 6.25-15 MG/5ML syrup Take 5 mLs by mouth 4 times daily as needed for Cough 118 mL 2    ondansetron (ZOFRAN-ODT) 4 MG disintegrating tablet Take 1 tablet by mouth 3 times daily as needed for Nausea or Vomiting 12 tablet 0    olmesartan (BENICAR) 20 MG tablet Take 1 tablet by mouth daily 90 tablet 1    pravastatin (PRAVACHOL) 40 MG tablet Take 1 tablet by mouth daily 90 tablet 1    metFORMIN (GLUCOPHAGE-XR) 500 MG extended release tablet TAKE 1 TABLET BY MOUTH TWICE DAILY WITH MEALS 180 tablet 1    sildenafil (VIAGRA) 100 MG tablet       fluticasone (FLONASE) 50 MCG/ACT nasal spray 1 spray by Each Nostril route daily 32 g 1    loratadine-pseudoephedrine (CLARITIN-D 12HR) 5-120 MG per extended release tablet Take 1 tablet by mouth daily as needed (allergies)      Acetaminophen-Caffeine (EXCEDRIN TENSION HEADACHE) TABS Take 2 tablets by mouth daily as needed (migraine)      esomeprazole (NEXIUM) 20 MG delayed release capsule Take 1 capsule by mouth daily       No current facility-administered medications for this

## 2024-05-10 ENCOUNTER — ANTI-COAG VISIT (OUTPATIENT)
Dept: PHARMACY | Age: 59
End: 2024-05-10
Payer: COMMERCIAL

## 2024-05-10 DIAGNOSIS — Z86.718 HISTORY OF DVT (DEEP VEIN THROMBOSIS): Primary | ICD-10-CM

## 2024-05-10 LAB — INTERNATIONAL NORMALIZATION RATIO, POC: 2.5

## 2024-05-10 PROCEDURE — 85610 PROTHROMBIN TIME: CPT

## 2024-05-10 PROCEDURE — 99212 OFFICE O/P EST SF 10 MIN: CPT

## 2024-05-10 NOTE — PROGRESS NOTES
Don Browning is a 58 y.o. here for warfarin management.  Don had an INR test today. Results were reviewed and appropriate warfarin management was completed.     Patient verifies current warfarin dosing regimen: Yes     Warfarin medication reviewed and updated on the patient 's home medication list: Yes   All other medications reviewed and updated on the patient 's home medication list: No: no changes     Lab Results   Component Value Date    INR 2.5 05/10/2024    INR 1.7 2024    INR 1.5 2024     Patient Findings       Negatives:  Signs/symptoms of thrombosis, Signs/symptoms of bleeding, Change in medications, Change in diet/appetite, Bruising          Anticoagulation Summary  As of 5/10/2024      INR goal:  2.0-3.0   TTR:  37.1 % (2 y)   INR used for dosin.5 (5/10/2024)   Warfarin maintenance plan:  5 mg (10 mg x 0.5) every day   Weekly warfarin total:  35 mg   Plan last modified:  Irish Arellano RP (5/10/2024)   Next INR check:  2024   Priority:  Maintenance   Target end date:  Indefinite    Indications    History of DVT (deep vein thrombosis) x2 [Z86.718]                 Anticoagulation Episode Summary       INR check location:      Preferred lab:      Send INR reminders to:  [facility]    Comments:  EP[facility]          Anticoagulation Care Providers       Provider Role Specialty Phone number    Ann, Cole BECERRA MD Referring Family Medicine [phone]          There were no vitals taken for this visit.    Warfarin assessment / plan:     Patient appears well today.      INR today is within goal range, however, he missed two warfarin doses. With the missed warfarin doses, I would expect his INR to be below goal.     He reports he took his dose today already, however, he only took 5mg (vs 10mg as instructed).  This makes me question whether he took the 10mg dose last Friday.  He does not remember.     He reports eating brussel sprouts in the past week which falls within in

## 2024-05-24 ENCOUNTER — APPOINTMENT (OUTPATIENT)
Dept: PHARMACY | Age: 59
End: 2024-05-24
Payer: COMMERCIAL

## 2024-05-30 ENCOUNTER — ANTI-COAG VISIT (OUTPATIENT)
Dept: PHARMACY | Age: 59
End: 2024-05-30
Payer: COMMERCIAL

## 2024-05-30 ENCOUNTER — TELEPHONE (OUTPATIENT)
Dept: PHARMACY | Age: 59
End: 2024-05-30

## 2024-05-30 DIAGNOSIS — Z86.718 HISTORY OF DVT (DEEP VEIN THROMBOSIS): Primary | ICD-10-CM

## 2024-05-30 LAB — INTERNATIONAL NORMALIZATION RATIO, POC: 2.2

## 2024-05-30 PROCEDURE — 85610 PROTHROMBIN TIME: CPT | Performed by: INTERNAL MEDICINE

## 2024-05-30 PROCEDURE — 99211 OFF/OP EST MAY X REQ PHY/QHP: CPT | Performed by: INTERNAL MEDICINE

## 2024-05-30 NOTE — PROGRESS NOTES
Don Browning is a 58 y.o. here for warfarin management.  Don had an INR test today. Results were reviewed and appropriate warfarin management was completed.     Patient verifies current warfarin dosing regimen: Yes     Warfarin medication reviewed and updated on the patient 's home medication list: Yes   All other medications reviewed and updated on the patient 's home medication list: Yes     Lab Results   Component Value Date    INR 2.2 2024    INR 2.5 05/10/2024    INR 1.7 2024       Anticoagulation Summary  As of 2024      INR goal:  2.0-3.0   TTR:  38.9 % (2 y)   INR used for dosin.2 (2024)   Warfarin maintenance plan:  5 mg (10 mg x 0.5) every day   Weekly warfarin total:  35 mg   Plan last modified:  Irish Arellano RP (5/10/2024)   Next INR check:  2024   Priority:  Maintenance   Target end date:  Indefinite    Indications    History of DVT (deep vein thrombosis) x2 [Z86.718]                 Anticoagulation Episode Summary       INR check location:      Preferred lab:      Send INR reminders to:  [facility]    Comments:  EPIC          Anticoagulation Care Providers       Provider Role Specialty Phone number    Cole Juarez MD Referring Family Medicine [phone]          There were no vitals taken for this visit.    Warfarin assessment / plan:     Patient appears well today.      No changes affecting warfarin therapy were noted.   No acute findings with regards to warfarin therapy.  INR today is within goal range.     Instructed to continue the same weekly warfarin dose.    No changes or concerns, RTC in one month according to work schedule.    Description    CONTINUE DOSE: Warfarin 5 mg (1/2 tablet) daily     Call [phone] with signs or symptoms of bleeding or ANY medication changes (including antibiotics, steroids, over-the-counter medications or herbal supplements).       If significant bleeding occurs or if you fall and hit your head, please

## 2024-05-30 NOTE — TELEPHONE ENCOUNTER
Pt called to inform clinic after his visit today that he was starting on Bactrim tonight. He is already taking half tablets daily, so we will have him hold warfarin on Sat 6/1 and Tues 6/4 and then get INR checked on Friday 6/7. Pt cannot be seen sooner than that due to work.    Becca Felix, PharmD 5/30/2024 4:13 PM   [facility]  [phone]

## 2024-06-02 DIAGNOSIS — E11.65 UNCONTROLLED TYPE 2 DIABETES MELLITUS WITH HYPERGLYCEMIA (HCC): ICD-10-CM

## 2024-06-03 RX ORDER — METFORMIN HYDROCHLORIDE 500 MG/1
TABLET, EXTENDED RELEASE ORAL
Qty: 180 TABLET | Refills: 1 | Status: SHIPPED | OUTPATIENT
Start: 2024-06-03

## 2024-06-07 ENCOUNTER — ANTI-COAG VISIT (OUTPATIENT)
Dept: PHARMACY | Age: 59
End: 2024-06-07
Payer: COMMERCIAL

## 2024-06-07 DIAGNOSIS — Z86.718 HISTORY OF DVT (DEEP VEIN THROMBOSIS): Primary | ICD-10-CM

## 2024-06-07 LAB — INTERNATIONAL NORMALIZATION RATIO, POC: 1.4

## 2024-06-07 PROCEDURE — 99211 OFF/OP EST MAY X REQ PHY/QHP: CPT

## 2024-06-07 PROCEDURE — 85610 PROTHROMBIN TIME: CPT

## 2024-06-07 NOTE — PROGRESS NOTES
have him take an increased dose of warfarin 10mg today only(6-7-24). We will then have him take his usual dose of 5mg daily except NONE on Wednesday(6-12-24). He will return in 1 week for his next INR. He was advised to watch for any signs/symptoms of clotting. He will call with any issues.            Description    Take warfarin 10mg(1 tablet) today only(6-7-24)   Take 5mg(1/2 tablet) daily except NOTHING on Wednesday 6-12-24 then  CONTINUE DOSE: Warfarin 5 mg (1/2 tablet) daily     Call [phone] with signs or symptoms of bleeding or ANY medication changes (including antibiotics, steroids, over-the-counter medications or herbal supplements).       If significant bleeding occurs or if you fall and hit your head, please seek immediate medical attention.    Keep the number of servings of vitamin K containing foods (dark green, leafy vegetables) the same each week. Please call if this changes.    Try and intake 1-2 servings of green leafy vegetables per week     Limit alcohol intake. Please call if this changes.         Immunization History   Administered Date(s) Administered    COVID-19, PFIZER PURPLE top, DILUTE for use, (age 12 y+), 30mcg/0.3mL 05/07/2021, 06/04/2021    TDaP, ADACEL (age 10y-64y), BOOSTRIX (age 10y+), IM, 0.5mL 01/05/2019    Td vaccine (adult) 07/14/2015       Orders Placed This Encounter   Procedures    POCT INR     This external order was created through the results console.      No orders of the defined types were placed in this encounter.     Reviewed AVS with patient / caregiver.    Billing Points:  Adjust dosage and/or reconcile meds (fill pill box) </= 5 medications - 2 points     For Pharmacy Admin Tracking Only    Intervention Detail: Dose Adjustment: 1, reason: Therapy Optimization  Total # of Interventions Recommended: 1  Total # of Interventions Accepted: 1  Time Spent (min): 20

## 2024-06-09 DIAGNOSIS — E78.2 MIXED HYPERLIPIDEMIA: ICD-10-CM

## 2024-06-10 RX ORDER — PRAVASTATIN SODIUM 40 MG
40 TABLET ORAL DAILY
Qty: 90 TABLET | Refills: 1 | Status: SHIPPED | OUTPATIENT
Start: 2024-06-10

## 2024-06-14 ENCOUNTER — ANTI-COAG VISIT (OUTPATIENT)
Dept: PHARMACY | Age: 59
End: 2024-06-14

## 2024-06-14 DIAGNOSIS — Z86.718 HISTORY OF DVT (DEEP VEIN THROMBOSIS): Primary | ICD-10-CM

## 2024-06-14 LAB — INR BLD: 2.5

## 2024-06-14 PROCEDURE — 85610 PROTHROMBIN TIME: CPT

## 2024-06-14 PROCEDURE — 99211 OFF/OP EST MAY X REQ PHY/QHP: CPT

## 2024-06-14 NOTE — PROGRESS NOTES
Don Browning is a 58 y.o. here for warfarin management.  Don had an INR test today. Results were reviewed and appropriate warfarin management was completed.     Patient verifies current warfarin dosing regimen: Yes     Warfarin medication reviewed and updated on the patient 's home medication list: Yes   All other medications reviewed and updated on the patient 's home medication list: No new medications     Lab Results   Component Value Date    INR 2.50 2024    INR 1.4 2024    INR 2.2 2024     Patient Findings       Negatives:  Signs/symptoms of thrombosis, Signs/symptoms of bleeding, Change in health, Missed doses, Extra doses, Change in medications, Change in diet/appetite, Bruising    Comments:  Finished bactrim yesterday          Anticoagulation Summary  As of 2024      INR goal:  2.0-3.0   TTR:  38.8 % (2.1 y)   INR used for dosin.50 (2024)   Warfarin maintenance plan:  5 mg (10 mg x 0.5) every day   Weekly warfarin total:  35 mg   Plan last modified:  Irish Arellano RP (5/10/2024)   Next INR check:  2024   Priority:  Maintenance   Target end date:  Indefinite    Indications    History of DVT (deep vein thrombosis) x2 [Z86.718]                 Anticoagulation Episode Summary       INR check location:      Preferred lab:      Send INR reminders to:  [facility]    Comments:  EPIC          Anticoagulation Care Providers       Provider Role Specialty Phone number    Cole Juarez MD Referring Family Medicine [phone]          There were no vitals taken for this visit.    Warfarin assessment / plan:     Patient appears well today.      No acute findings with regards to warfarin therapy.  INR today is within goal range.     Instructed to continue the same weekly warfarin dose.      INR is therapeutic today at 2.5  CONTINUE DOSE: Warfarin 5 mg (1/2 tablet) daily   Patient was previously stable and in goal with this warfarin dose before a 14-day

## 2024-06-15 DIAGNOSIS — E11.9 TYPE 2 DIABETES MELLITUS WITHOUT COMPLICATION, WITHOUT LONG-TERM CURRENT USE OF INSULIN (HCC): ICD-10-CM

## 2024-06-17 RX ORDER — OLMESARTAN MEDOXOMIL 20 MG/1
20 TABLET ORAL DAILY
Qty: 90 TABLET | Refills: 1 | Status: SHIPPED | OUTPATIENT
Start: 2024-06-17

## 2024-07-12 ENCOUNTER — APPOINTMENT (OUTPATIENT)
Dept: PHARMACY | Age: 59
End: 2024-07-12
Payer: COMMERCIAL

## 2024-07-19 ENCOUNTER — ANTI-COAG VISIT (OUTPATIENT)
Dept: PHARMACY | Age: 59
End: 2024-07-19
Payer: COMMERCIAL

## 2024-07-19 DIAGNOSIS — Z86.718 HISTORY OF DVT (DEEP VEIN THROMBOSIS): Primary | ICD-10-CM

## 2024-07-19 LAB
INR BLD: 2.6
PROTIME: NORMAL SECONDS

## 2024-07-19 PROCEDURE — 85610 PROTHROMBIN TIME: CPT

## 2024-07-19 PROCEDURE — 99211 OFF/OP EST MAY X REQ PHY/QHP: CPT

## 2024-07-19 NOTE — PROGRESS NOTES
Don Browning is a 58 y.o. here for warfarin management.  Don had an INR test today. Results were reviewed and appropriate warfarin management was completed.     Patient verifies current warfarin dosing regimen: Yes     Warfarin medication reviewed and updated on the patient 's home medication list: Yes   All other medications reviewed and updated on the patient 's home medication list: No medication changes     Lab Results   Component Value Date    INR 2.50 06/14/2024    INR 1.4 06/07/2024    INR 2.2 05/30/2024       Anticoagulation Summary  As of 7/19/2024      INR goal:  2.0-3.0   TTR:  38.8 % (2.1 y)   INR used for dosing:     Warfarin maintenance plan:  5 mg (10 mg x 0.5) every day   Weekly warfarin total:  35 mg   Plan last modified:  Irish Arellano RP (5/10/2024)   Next INR check:     Priority:  Maintenance   Target end date:  Indefinite    Indications    History of DVT (deep vein thrombosis) x2 [Z86.718]                 Anticoagulation Episode Summary       INR check location:      Preferred lab:      Send INR reminders to:  [facility]    Comments:  EPIC          Anticoagulation Care Providers       Provider Role Specialty Phone number    Cole Juarez MD Referring Family Medicine [phone]          There were no vitals taken for this visit.    Warfarin assessment / plan:     Patient appears well today.   No changes affecting warfarin therapy were noted.   No acute findings with regards to warfarin therapy.  INR today is within goal range.  Instructed to continue the same weekly warfarin dose.      Patient's INR is 2.6 today which is in range and therapeutic. No changes in patient's medications, lifestyle, or diet. Recommended to continue current weekly warfarin dose. RTC in 4 weeks 8/16.    Description    CONTINUE DOSE: Warfarin 5 mg (1/2 tablet) daily     Call [phone] with signs or symptoms of bleeding or ANY medication changes (including antibiotics, steroids,

## 2024-08-16 ENCOUNTER — ANTI-COAG VISIT (OUTPATIENT)
Dept: PHARMACY | Age: 59
End: 2024-08-16
Payer: COMMERCIAL

## 2024-08-16 DIAGNOSIS — Z86.718 HISTORY OF DVT (DEEP VEIN THROMBOSIS): Primary | ICD-10-CM

## 2024-08-16 LAB
INTERNATIONAL NORMALIZATION RATIO, POC: 2.1
PROTHROMBIN TIME, POC: 0

## 2024-08-16 PROCEDURE — 99211 OFF/OP EST MAY X REQ PHY/QHP: CPT

## 2024-08-16 PROCEDURE — 85610 PROTHROMBIN TIME: CPT

## 2024-08-16 NOTE — PROGRESS NOTES
Don Browning is a 58 y.o. here for warfarin management.  Don had an INR test today. Results were reviewed and appropriate warfarin management was completed.     Patient verifies current warfarin dosing regimen: Yes     Warfarin medication reviewed and updated on the patient 's home medication list: Yes   All other medications reviewed and updated on the patient 's home medication list: No: no changes     Lab Results   Component Value Date    INR 2.60 07/19/2024    INR 2.50 06/14/2024    INR 1.4 06/07/2024     Patient Findings       Negatives:  Signs/symptoms of bleeding, Change in health, Missed doses, Change in medications, Change in diet/appetite, Bruising    Comments:  Will have nreve ablation on 9-11-24          Anticoagulation Summary  As of 8/16/2024      INR goal:  2.0-3.0   TTR:  41.5% (2.2 y)   INR used for dosing:  --   Warfarin maintenance plan:  5 mg (10 mg x 0.5) every day   Weekly warfarin total:  35 mg   Plan last modified:  Irish Arellano [facility] (5/10/2024)   Next INR check:  --   Target end date:  Indefinite    Indications    History of DVT (deep vein thrombosis) x2 [Z86.718]                 Anticoagulation Episode Summary       INR check location:  --    Preferred lab:  --    Send INR reminders to:  [facility]    Comments:  EPIC          Anticoagulation Care Providers       Provider Role Specialty Phone number    Cole Juarez MD Referring Family Medicine [phone]          There were no vitals taken for this visit.    Warfarin assessment / plan:     Patient appears well today. Don states that her will likely have a nerve ablation procedure on his neck on 9-11-24. He has had this procedure 3 times before, and the warfarin was not held. He will let us know if his warfarin will be held for this procedure.      No changes affecting warfarin therapy were noted.   No acute findings with regards to warfarin therapy.  INR today is within goal range.     Instructed to continue

## 2024-08-26 RX ORDER — AMITRIPTYLINE HYDROCHLORIDE 10 MG/1
TABLET ORAL
Qty: 90 TABLET | OUTPATIENT
Start: 2024-08-26

## 2024-09-09 DIAGNOSIS — J01.91 ACUTE RECURRENT SINUSITIS, UNSPECIFIED LOCATION: ICD-10-CM

## 2024-09-09 RX ORDER — DEXTROMETHORPHAN HYDROBROMIDE AND PROMETHAZINE HYDROCHLORIDE 15; 6.25 MG/5ML; MG/5ML
5 SYRUP ORAL 4 TIMES DAILY PRN
Qty: 118 ML | Refills: 2 | OUTPATIENT
Start: 2024-09-09

## 2024-09-11 NOTE — TELEPHONE ENCOUNTER
Pt called in to check status of medication for sinus infection looks like it was refused   Pt said he goes though this every year he wants to know does he need to do a VV ?

## 2024-09-12 DIAGNOSIS — E78.2 MIXED HYPERLIPIDEMIA: ICD-10-CM

## 2024-09-12 RX ORDER — PRAVASTATIN SODIUM 40 MG
40 TABLET ORAL DAILY
Qty: 90 TABLET | Refills: 1 | Status: SHIPPED | OUTPATIENT
Start: 2024-09-12

## 2024-09-13 ENCOUNTER — OFFICE VISIT (OUTPATIENT)
Dept: FAMILY MEDICINE CLINIC | Age: 59
End: 2024-09-13
Payer: COMMERCIAL

## 2024-09-13 ENCOUNTER — ANTI-COAG VISIT (OUTPATIENT)
Dept: PHARMACY | Age: 59
End: 2024-09-13
Payer: COMMERCIAL

## 2024-09-13 VITALS
WEIGHT: 207 LBS | DIASTOLIC BLOOD PRESSURE: 80 MMHG | BODY MASS INDEX: 25.21 KG/M2 | HEIGHT: 76 IN | SYSTOLIC BLOOD PRESSURE: 124 MMHG | RESPIRATION RATE: 16 BRPM | HEART RATE: 72 BPM | OXYGEN SATURATION: 98 %

## 2024-09-13 DIAGNOSIS — E11.9 TYPE 2 DIABETES MELLITUS WITHOUT COMPLICATION, WITHOUT LONG-TERM CURRENT USE OF INSULIN (HCC): Primary | ICD-10-CM

## 2024-09-13 DIAGNOSIS — E78.2 MIXED HYPERLIPIDEMIA: ICD-10-CM

## 2024-09-13 DIAGNOSIS — Z86.718 HISTORY OF DVT (DEEP VEIN THROMBOSIS): Primary | ICD-10-CM

## 2024-09-13 DIAGNOSIS — I10 ESSENTIAL HYPERTENSION: ICD-10-CM

## 2024-09-13 DIAGNOSIS — Z86.718 HISTORY OF DVT (DEEP VEIN THROMBOSIS): ICD-10-CM

## 2024-09-13 LAB
HBA1C MFR BLD: 7.9 %
INTERNATIONAL NORMALIZATION RATIO, POC: 1.7
PROTHROMBIN TIME, POC: 0

## 2024-09-13 PROCEDURE — 3074F SYST BP LT 130 MM HG: CPT | Performed by: FAMILY MEDICINE

## 2024-09-13 PROCEDURE — 3051F HG A1C>EQUAL 7.0%<8.0%: CPT | Performed by: FAMILY MEDICINE

## 2024-09-13 PROCEDURE — 3079F DIAST BP 80-89 MM HG: CPT | Performed by: FAMILY MEDICINE

## 2024-09-13 PROCEDURE — 85610 PROTHROMBIN TIME: CPT

## 2024-09-13 PROCEDURE — 83036 HEMOGLOBIN GLYCOSYLATED A1C: CPT | Performed by: FAMILY MEDICINE

## 2024-09-13 PROCEDURE — 99214 OFFICE O/P EST MOD 30 MIN: CPT | Performed by: FAMILY MEDICINE

## 2024-09-13 PROCEDURE — 99213 OFFICE O/P EST LOW 20 MIN: CPT

## 2024-09-13 RX ORDER — SITAGLIPTIN AND METFORMIN HYDROCHLORIDE 1000; 100 MG/1; MG/1
1 TABLET, FILM COATED, EXTENDED RELEASE ORAL DAILY
Qty: 90 TABLET | Refills: 1 | Status: SHIPPED | OUTPATIENT
Start: 2024-09-13

## 2024-09-13 RX ORDER — WARFARIN SODIUM 10 MG/1
5 TABLET ORAL DAILY
Qty: 45 TABLET | Refills: 3 | Status: SHIPPED | OUTPATIENT
Start: 2024-09-13

## 2024-09-13 RX ORDER — DEXTROMETHORPHAN HYDROBROMIDE AND PROMETHAZINE HYDROCHLORIDE 15; 6.25 MG/5ML; MG/5ML
5 SYRUP ORAL 4 TIMES DAILY PRN
Qty: 118 ML | Refills: 2 | Status: SHIPPED | OUTPATIENT
Start: 2024-09-13

## 2024-09-13 SDOH — ECONOMIC STABILITY: FOOD INSECURITY: WITHIN THE PAST 12 MONTHS, YOU WORRIED THAT YOUR FOOD WOULD RUN OUT BEFORE YOU GOT MONEY TO BUY MORE.: NEVER TRUE

## 2024-09-13 SDOH — ECONOMIC STABILITY: INCOME INSECURITY: HOW HARD IS IT FOR YOU TO PAY FOR THE VERY BASICS LIKE FOOD, HOUSING, MEDICAL CARE, AND HEATING?: NOT HARD AT ALL

## 2024-09-13 SDOH — ECONOMIC STABILITY: FOOD INSECURITY: WITHIN THE PAST 12 MONTHS, THE FOOD YOU BOUGHT JUST DIDN'T LAST AND YOU DIDN'T HAVE MONEY TO GET MORE.: NEVER TRUE

## 2024-09-19 DIAGNOSIS — E11.9 TYPE 2 DIABETES MELLITUS WITHOUT COMPLICATION, WITHOUT LONG-TERM CURRENT USE OF INSULIN (HCC): ICD-10-CM

## 2024-09-19 RX ORDER — OLMESARTAN MEDOXOMIL 20 MG/1
20 TABLET ORAL DAILY
Qty: 90 TABLET | Refills: 1 | Status: SHIPPED | OUTPATIENT
Start: 2024-09-19

## 2024-10-02 DIAGNOSIS — J30.2 SEASONAL ALLERGIES: ICD-10-CM

## 2024-10-02 RX ORDER — FLUTICASONE PROPIONATE 50 MCG
SPRAY, SUSPENSION (ML) NASAL
Qty: 32 G | Refills: 1 | Status: SHIPPED | OUTPATIENT
Start: 2024-10-02

## 2024-10-11 ENCOUNTER — ANTI-COAG VISIT (OUTPATIENT)
Dept: PHARMACY | Age: 59
End: 2024-10-11
Payer: COMMERCIAL

## 2024-10-11 DIAGNOSIS — Z86.718 HISTORY OF DVT (DEEP VEIN THROMBOSIS): Primary | ICD-10-CM

## 2024-10-11 LAB
INTERNATIONAL NORMALIZATION RATIO, POC: 2.3
PROTHROMBIN TIME, POC: 0

## 2024-10-11 PROCEDURE — 85610 PROTHROMBIN TIME: CPT

## 2024-10-11 PROCEDURE — 99211 OFF/OP EST MAY X REQ PHY/QHP: CPT

## 2024-10-11 NOTE — PROGRESS NOTES
Don Browning is a 58 y.o. here for warfarin management.  Don had an INR test today. Results were reviewed and appropriate warfarin management was completed.     Patient verifies current warfarin dosing regimen: Yes     Warfarin medication reviewed and updated on the patient 's home medication list: Yes   All other medications reviewed and updated on the patient 's home medication list: Yes     Lab Results   Component Value Date    INR 2.3 10/11/2024    INR 1.7 2024    INR 2.1 2024     Patient Findings       Positives:  Missed doses    Negatives:  Signs/symptoms of thrombosis, Signs/symptoms of bleeding, Laboratory test error suspected, Change in health, Change in alcohol use, Change in activity, Upcoming invasive procedure, Emergency department visit, Upcoming dental procedure, Extra doses, Change in medications, Change in diet/appetite, Hospital admission, Bruising, Other complaints    Comments:  Skipped 2 doses last week after a minor procedure to minimize bleeding          Anticoagulation Summary  As of 10/11/2024      INR goal:  2.0-3.0   TTR:  43.1% (2.4 y)   INR used for dosin.3 (10/11/2024)   Warfarin maintenance plan:  5 mg (10 mg x 0.5) every day   Weekly warfarin total:  35 mg   Plan last modified:  Irish Arellano [facility] (5/10/2024)   Next INR check:  2024   Priority:  Maintenance   Target end date:  Indefinite    Indications    History of DVT (deep vein thrombosis) x2 [Z86.718]                 Anticoagulation Episode Summary       INR check location:  --    Preferred lab:  --    Send INR reminders to:  [facility]    Comments:  EPIC          Anticoagulation Care Providers       Provider Role Specialty Phone number    Cole Juarez MD Referring Family Medicine [phone]          There were no vitals taken for this visit.    Warfarin assessment / plan:     Patient appears well today.      No changes affecting warfarin therapy were noted.   No acute findings

## 2024-10-21 ENCOUNTER — TELEPHONE (OUTPATIENT)
Dept: FAMILY MEDICINE CLINIC | Age: 59
End: 2024-10-21

## 2024-10-21 NOTE — TELEPHONE ENCOUNTER
Patient is calling in requesting that Dr. Juarez fill out his FMLA Paperwork for a procedure he is having done with a pain specialist on Wednesday    He said the pain specialist office told him they cannot fill it out due to being short staffed    Please Advise

## 2024-11-08 ENCOUNTER — ANTI-COAG VISIT (OUTPATIENT)
Dept: PHARMACY | Age: 59
End: 2024-11-08
Payer: COMMERCIAL

## 2024-11-08 DIAGNOSIS — Z86.718 HISTORY OF DVT (DEEP VEIN THROMBOSIS): Primary | ICD-10-CM

## 2024-11-08 LAB
INTERNATIONAL NORMALIZATION RATIO, POC: 2.2
PROTHROMBIN TIME, POC: 0

## 2024-11-08 PROCEDURE — 99211 OFF/OP EST MAY X REQ PHY/QHP: CPT | Performed by: SPEECH-LANGUAGE PATHOLOGIST

## 2024-11-08 PROCEDURE — 85610 PROTHROMBIN TIME: CPT | Performed by: SPEECH-LANGUAGE PATHOLOGIST

## 2024-11-08 NOTE — PROGRESS NOTES
Don Browning is a 59 y.o. here for warfarin management.  Don had an INR test today. Results were reviewed and appropriate warfarin management was completed.     Patient verifies current warfarin dosing regimen: Yes     Warfarin medication reviewed and updated on the patient 's home medication list: Yes   All other medications reviewed and updated on the patient 's home medication list: Yes     Lab Results   Component Value Date    INR 2.2 2024    INR 2.3 10/11/2024    INR 1.7 2024       Anticoagulation Summary  As of 2024      INR goal:  2.0-3.0   TTR:  44.9% (2.5 y)   INR used for dosin.2 (2024)   Warfarin maintenance plan:  5 mg (10 mg x 0.5) every day   Weekly warfarin total:  35 mg   Plan last modified:  Irish Arellano RPH (5/10/2024)   Next INR check:  2024   Priority:  Maintenance   Target end date:  Indefinite    Indications    History of DVT (deep vein thrombosis) x2 [Z86.718]                 Anticoagulation Episode Summary       INR check location:  --    Preferred lab:  --    Send INR reminders to:  [facility]    Comments:  EPIC          Anticoagulation Care Providers       Provider Role Specialty Phone number    Cole Juarez MD Referring Family Medicine [phone]          There were no vitals taken for this visit.    Warfarin assessment / plan:     Patient appears well today.      No changes affecting warfarin therapy were noted.   No acute findings with regards to warfarin therapy.  INR today is within goal range.     Instructed to continue the same weekly warfarin dose.      Patient denies any changes to his diet, medications, or activity.  He did have a small spinach salad last night as one of his normal weekly servings of vitamin K containing green vegetables.    Description    CONTINUE DOSE: Warfarin 5 mg (1/2 tablet) daily     Call [phone] with signs or symptoms of bleeding or ANY medication changes (including antibiotics, steroids,

## 2024-12-06 ENCOUNTER — ANTI-COAG VISIT (OUTPATIENT)
Dept: PHARMACY | Age: 59
End: 2024-12-06
Payer: COMMERCIAL

## 2024-12-06 DIAGNOSIS — Z86.718 HISTORY OF DVT (DEEP VEIN THROMBOSIS): Primary | ICD-10-CM

## 2024-12-06 LAB
INR BLD: 1.6
PROTIME: NORMAL

## 2024-12-06 PROCEDURE — 99211 OFF/OP EST MAY X REQ PHY/QHP: CPT

## 2024-12-06 PROCEDURE — 85610 PROTHROMBIN TIME: CPT

## 2024-12-06 NOTE — PROGRESS NOTES
change and compliance) - 2 points     For Pharmacy Admin Tracking Only    Intervention Detail: Adherence Monitorin  Total # of Interventions Recommended: 1  Total # of Interventions Accepted: 1  Time Spent (min): 20   Bhavesh Meléndez  PharmD Candidate

## 2024-12-27 ENCOUNTER — APPOINTMENT (OUTPATIENT)
Dept: PHARMACY | Age: 59
End: 2024-12-27
Payer: COMMERCIAL

## 2025-01-03 ENCOUNTER — ANTI-COAG VISIT (OUTPATIENT)
Dept: PHARMACY | Age: 60
End: 2025-01-03

## 2025-01-03 DIAGNOSIS — Z86.718 HISTORY OF DVT (DEEP VEIN THROMBOSIS): Primary | ICD-10-CM

## 2025-01-03 LAB
INTERNATIONAL NORMALIZATION RATIO, POC: 1.2
PROTHROMBIN TIME, POC: 0

## 2025-01-10 ENCOUNTER — APPOINTMENT (OUTPATIENT)
Dept: PHARMACY | Age: 60
End: 2025-01-10
Payer: COMMERCIAL

## 2025-01-17 ENCOUNTER — ANTI-COAG VISIT (OUTPATIENT)
Dept: PHARMACY | Age: 60
End: 2025-01-17
Payer: COMMERCIAL

## 2025-01-17 DIAGNOSIS — Z86.718 HISTORY OF DVT (DEEP VEIN THROMBOSIS): Primary | ICD-10-CM

## 2025-01-17 LAB
INTERNATIONAL NORMALIZATION RATIO, POC: 1.9
PROTHROMBIN TIME, POC: 0

## 2025-01-17 PROCEDURE — 85610 PROTHROMBIN TIME: CPT

## 2025-01-17 PROCEDURE — 99212 OFFICE O/P EST SF 10 MIN: CPT

## 2025-01-17 NOTE — PROGRESS NOTES
Don Browning is a 59 y.o. here for warfarin management.  Don had an INR test today. Results were reviewed and appropriate warfarin management was completed.     Patient verifies current warfarin dosing regimen: Yes     Warfarin medication reviewed and updated on the patient 's home medication list: Yes   All other medications reviewed and updated on the patient 's home medication list: No: no changes     Lab Results   Component Value Date    INR 1.9 2025    INR 1.2 2025    INR 1.60 2024     Anticoagulation Summary  As of 2025      INR goal:  2.0-3.0   TTR:  42.6% (2.7 y)   INR used for dosin.9 (2025)   Warfarin maintenance plan:  5 mg (10 mg x 0.5) every day   Weekly warfarin total:  35 mg   Plan last modified:  Irish Arellano [facility] (2025)   Next INR check:  2025   Priority:  Maintenance   Target end date:  Indefinite    Indications    History of DVT (deep vein thrombosis) x2 [Z86.718]                 Anticoagulation Episode Summary       INR check location:  --    Preferred lab:  --    Send INR reminders to:  [facility]AFF    Comments:  EPIC          Anticoagulation Care Providers       Provider Role Specialty Phone number    Cole Juarez MD Referring Family Medicine [phone]          There were no vitals taken for this visit.    Warfarin assessment / plan:     Appears well  Sub-therapeutic INR     Denies increased vitamin K intake.  Denies medication changes.  Denies signs or symptoms of clotting.  Denies signs or symptoms of a stroke     Missed Warfarin dose(s) on the following dates: 1/15 and  .    Don held warfarin as noted above due to excessive bruising with a blood draw on . INR may have been elevated  given results today after holding warfarin for 2 days.     Instructed to go ahead and take warfarin 10mg today and then decrease to 5mg daily    Description    Take warfarin 10mg today ONLY    THEN NEW DOSE: Warfarin 5 mg (

## 2025-01-30 ENCOUNTER — ANTI-COAG VISIT (OUTPATIENT)
Dept: PHARMACY | Age: 60
End: 2025-01-30
Payer: COMMERCIAL

## 2025-01-30 DIAGNOSIS — Z86.718 HISTORY OF DVT (DEEP VEIN THROMBOSIS): Primary | ICD-10-CM

## 2025-01-30 LAB
INTERNATIONAL NORMALIZATION RATIO, POC: 2.7
PROTHROMBIN TIME, POC: 0

## 2025-01-30 PROCEDURE — 99211 OFF/OP EST MAY X REQ PHY/QHP: CPT

## 2025-01-30 PROCEDURE — 85610 PROTHROMBIN TIME: CPT

## 2025-01-30 NOTE — PROGRESS NOTES
Don Browning is a 59 y.o. here for warfarin management.  Don had an INR test today. Results were reviewed and appropriate warfarin management was completed.     Patient verifies current warfarin dosing regimen: Yes     Warfarin medication reviewed and updated on the patient 's home medication list: Yes   All other medications reviewed and updated on the patient 's home medication list: No new medications     Lab Results   Component Value Date    INR 2.7 2025    INR 1.9 2025    INR 1.2 2025     Patient Findings       Negatives:  Signs/symptoms of thrombosis, Signs/symptoms of bleeding, Change in health, Missed doses, Change in medications, Change in diet/appetite, Bruising          Anticoagulation Summary  As of 2025      INR goal:  2.0-3.0   TTR:  43.2% (2.7 y)   INR used for dosin.7 (2025)   Warfarin maintenance plan:  5 mg (10 mg x 0.5) every day   Weekly warfarin total:  35 mg   Plan last modified:  Irish Arellano [facility] (2025)   Next INR check:  2025   Priority:  Maintenance   Target end date:  Indefinite    Indications    History of DVT (deep vein thrombosis) x2 [Z86.718]                 Anticoagulation Episode Summary       INR check location:  --    Preferred lab:  --    Send INR reminders to:  [facility]AFF    Comments:  EPIC          Anticoagulation Care Providers       Provider Role Specialty Phone number    Cole Juarez MD Referring Family Medicine [phone]          There were no vitals taken for this visit.    Warfarin assessment / plan:     Patient appears well today.      No changes affecting warfarin therapy were noted.   No acute findings with regards to warfarin therapy.  INR today is within goal range.     Instructed to continue the same weekly warfarin dose.      CONTINUE: Warfarin 5 mg (1/2 tablet) daily     Next INR in 4 weeks    Description    CONTINUE: Warfarin 5 mg (1/2 tablet) daily     Call [phone] with signs or

## 2025-02-27 ENCOUNTER — ANTI-COAG VISIT (OUTPATIENT)
Dept: PHARMACY | Age: 60
End: 2025-02-27

## 2025-02-27 DIAGNOSIS — Z86.718 HISTORY OF DVT (DEEP VEIN THROMBOSIS): Primary | ICD-10-CM

## 2025-02-27 LAB
INTERNATIONAL NORMALIZATION RATIO, POC: 2.2
PROTHROMBIN TIME, POC: 0

## 2025-02-27 PROCEDURE — 99211 OFF/OP EST MAY X REQ PHY/QHP: CPT

## 2025-02-27 PROCEDURE — 85610 PROTHROMBIN TIME: CPT

## 2025-02-27 NOTE — PROGRESS NOTES
your head, please seek immediate medical attention.    Keep the number of servings of vitamin K containing foods (dark green, leafy vegetables) the same each week. Please call if this changes.    Try and intake 1-2 servings of green leafy vegetables per week     Limit alcohol intake. Please call if this changes.         Immunization History   Administered Date(s) Administered    COVID-19, PFIZER PURPLE top, DILUTE for use, (age 12 y+), 30mcg/0.3mL 2021, 2021    TDaP, ADACEL (age 10y-64y), BOOSTRIX (age 10y+), IM, 0.5mL 2019    Td vaccine (adult) 2015       Orders Placed This Encounter   Procedures    POCT INR     This external order was created through the results console.      No orders of the defined types were placed in this encounter.     Reviewed AVS with patient / caregiver.    Billing Points:  0 billing points this visit     For Pharmacy Admin Tracking Only    Intervention Detail: Adherence Monitorin  Total # of Interventions Recommended: 0  Total # of Interventions Accepted: 0  Time Spent (min): 20

## 2025-03-14 DIAGNOSIS — E78.2 MIXED HYPERLIPIDEMIA: ICD-10-CM

## 2025-03-14 RX ORDER — PRAVASTATIN SODIUM 40 MG
40 TABLET ORAL DAILY
Qty: 90 TABLET | Refills: 1 | Status: SHIPPED | OUTPATIENT
Start: 2025-03-14

## 2025-03-19 DIAGNOSIS — E11.9 TYPE 2 DIABETES MELLITUS WITHOUT COMPLICATION, WITHOUT LONG-TERM CURRENT USE OF INSULIN: ICD-10-CM

## 2025-03-19 RX ORDER — OLMESARTAN MEDOXOMIL 20 MG/1
20 TABLET ORAL DAILY
Qty: 30 TABLET | Refills: 0 | Status: SHIPPED | OUTPATIENT
Start: 2025-03-19

## 2025-03-27 ENCOUNTER — ANTI-COAG VISIT (OUTPATIENT)
Dept: PHARMACY | Age: 60
End: 2025-03-27

## 2025-03-27 DIAGNOSIS — Z86.718 HISTORY OF DVT (DEEP VEIN THROMBOSIS): Primary | ICD-10-CM

## 2025-03-27 LAB
INTERNATIONAL NORMALIZATION RATIO, POC: 2.1
PROTHROMBIN TIME, POC: 0

## 2025-03-27 PROCEDURE — 85610 PROTHROMBIN TIME: CPT | Performed by: INTERNAL MEDICINE

## 2025-03-27 PROCEDURE — 99211 OFF/OP EST MAY X REQ PHY/QHP: CPT | Performed by: INTERNAL MEDICINE

## 2025-03-27 NOTE — PROGRESS NOTES
Don Browning is a 59 y.o. here for warfarin management.  Don had an INR test today. Results were reviewed and appropriate warfarin management was completed.     Patient verifies current warfarin dosing regimen: Yes     Warfarin medication reviewed and updated on the patient 's home medication list: Yes   All other medications reviewed and updated on the patient 's home medication list: Yes     Lab Results   Component Value Date    INR 2.1 2025    INR 2.2 2025    INR 2.7 2025       Anticoagulation Summary  As of 3/27/2025      INR goal:  2.0-3.0   TTR:  46.2% (2.8 y)   INR used for dosin.1 (3/27/2025)   Warfarin maintenance plan:  5 mg (10 mg x 0.5) every day   Weekly warfarin total:  35 mg   Plan last modified:  Irish Arellano RPH (2025)   Next INR check:  2025   Priority:  Maintenance   Target end date:  Indefinite    Indications    History of DVT (deep vein thrombosis) x2 [Z86.718]                 Anticoagulation Episode Summary       INR check location:  --    Preferred lab:  --    Send INR reminders to:  [facility] STAFF    Comments:  EPIC          Anticoagulation Care Providers       Provider Role Specialty Phone number    Cole Juarez MD Referring Family Medicine [phone]          There were no vitals taken for this visit.    Warfarin assessment / plan:     Patient appears well today.      No changes affecting warfarin therapy were noted.   No acute findings with regards to warfarin therapy.  INR today is within goal range.     Instructed to continue the same weekly warfarin dose.    RTC 4 weeks    Description    CONTINUE: Warfarin 5 mg (1/2 tablet) daily     Call [phone] with signs or symptoms of bleeding or ANY medication changes (including antibiotics, steroids, over-the-counter medications or herbal supplements).       If significant bleeding occurs or if you fall and hit your head, please seek immediate medical attention.    Keep the number of

## 2025-03-29 DIAGNOSIS — Z86.718 HISTORY OF DVT (DEEP VEIN THROMBOSIS): ICD-10-CM

## 2025-03-31 RX ORDER — WARFARIN SODIUM 10 MG/1
TABLET ORAL
Qty: 30 TABLET | Refills: 3 | Status: SHIPPED | OUTPATIENT
Start: 2025-03-31

## 2025-04-10 SDOH — ECONOMIC STABILITY: FOOD INSECURITY: WITHIN THE PAST 12 MONTHS, YOU WORRIED THAT YOUR FOOD WOULD RUN OUT BEFORE YOU GOT MONEY TO BUY MORE.: NEVER TRUE

## 2025-04-10 SDOH — ECONOMIC STABILITY: FOOD INSECURITY: WITHIN THE PAST 12 MONTHS, THE FOOD YOU BOUGHT JUST DIDN'T LAST AND YOU DIDN'T HAVE MONEY TO GET MORE.: NEVER TRUE

## 2025-04-10 SDOH — ECONOMIC STABILITY: INCOME INSECURITY: IN THE LAST 12 MONTHS, WAS THERE A TIME WHEN YOU WERE NOT ABLE TO PAY THE MORTGAGE OR RENT ON TIME?: NO

## 2025-04-10 ASSESSMENT — PATIENT HEALTH QUESTIONNAIRE - PHQ9
1. LITTLE INTEREST OR PLEASURE IN DOING THINGS: NOT AT ALL
SUM OF ALL RESPONSES TO PHQ QUESTIONS 1-9: 0
SUM OF ALL RESPONSES TO PHQ QUESTIONS 1-9: 0
SUM OF ALL RESPONSES TO PHQ9 QUESTIONS 1 & 2: 0
2. FEELING DOWN, DEPRESSED OR HOPELESS: NOT AT ALL
SUM OF ALL RESPONSES TO PHQ QUESTIONS 1-9: 0
2. FEELING DOWN, DEPRESSED OR HOPELESS: NOT AT ALL
1. LITTLE INTEREST OR PLEASURE IN DOING THINGS: NOT AT ALL
SUM OF ALL RESPONSES TO PHQ QUESTIONS 1-9: 0

## 2025-04-11 ENCOUNTER — OFFICE VISIT (OUTPATIENT)
Dept: FAMILY MEDICINE CLINIC | Age: 60
End: 2025-04-11

## 2025-04-11 VITALS
RESPIRATION RATE: 16 BRPM | OXYGEN SATURATION: 98 % | SYSTOLIC BLOOD PRESSURE: 136 MMHG | HEIGHT: 76 IN | DIASTOLIC BLOOD PRESSURE: 80 MMHG | WEIGHT: 208 LBS | BODY MASS INDEX: 25.33 KG/M2 | HEART RATE: 93 BPM

## 2025-04-11 DIAGNOSIS — E11.9 TYPE 2 DIABETES MELLITUS WITHOUT COMPLICATION, WITHOUT LONG-TERM CURRENT USE OF INSULIN: Primary | ICD-10-CM

## 2025-04-11 DIAGNOSIS — E78.2 MIXED HYPERLIPIDEMIA: ICD-10-CM

## 2025-04-11 DIAGNOSIS — I10 ESSENTIAL HYPERTENSION: ICD-10-CM

## 2025-04-11 DIAGNOSIS — Z12.5 PROSTATE CANCER SCREENING: ICD-10-CM

## 2025-04-11 DIAGNOSIS — Z12.11 COLON CANCER SCREENING: ICD-10-CM

## 2025-04-11 PROCEDURE — 99214 OFFICE O/P EST MOD 30 MIN: CPT | Performed by: FAMILY MEDICINE

## 2025-04-11 PROCEDURE — 3079F DIAST BP 80-89 MM HG: CPT | Performed by: FAMILY MEDICINE

## 2025-04-11 PROCEDURE — G2211 COMPLEX E/M VISIT ADD ON: HCPCS | Performed by: FAMILY MEDICINE

## 2025-04-11 PROCEDURE — 3075F SYST BP GE 130 - 139MM HG: CPT | Performed by: FAMILY MEDICINE

## 2025-04-11 PROCEDURE — 83036 HEMOGLOBIN GLYCOSYLATED A1C: CPT | Performed by: FAMILY MEDICINE

## 2025-04-11 RX ORDER — HYDROCODONE BITARTRATE AND ACETAMINOPHEN 7.5; 325 MG/1; MG/1
1 TABLET ORAL EVERY 8 HOURS PRN
COMMUNITY
Start: 2025-03-20

## 2025-04-11 SDOH — ECONOMIC STABILITY: FOOD INSECURITY: WITHIN THE PAST 12 MONTHS, THE FOOD YOU BOUGHT JUST DIDN'T LAST AND YOU DIDN'T HAVE MONEY TO GET MORE.: NEVER TRUE

## 2025-04-11 SDOH — ECONOMIC STABILITY: FOOD INSECURITY: WITHIN THE PAST 12 MONTHS, YOU WORRIED THAT YOUR FOOD WOULD RUN OUT BEFORE YOU GOT MONEY TO BUY MORE.: NEVER TRUE

## 2025-04-11 NOTE — PROGRESS NOTES
Don Browning (:  1965) is a 59 y.o. male,Established patient, here for evaluation of the following chief complaint(s):  Medication Check (Pt is here for a f/u )      1. Type 2 diabetes mellitus without complication, without long-term current use of insulin  A1c is pending (point-of-care machine currently down)  Diet discussed  Continue Janumet  - POCT glycosylated hemoglobin (Hb A1C)  - Comprehensive Metabolic Panel; Future  - Lipid Panel; Future  - Albumin/Creatinine Ratio, Urine; Future  - Hemoglobin A1C; Future    2. Essential hypertension  Well controlled on current regimen. No side effects from medications. Advise low salt diet and routine exercise  - Comprehensive Metabolic Panel; Future  - Lipid Panel; Future  - Albumin/Creatinine Ratio, Urine; Future    3. Mixed hyperlipidemia  Continue statin  - Comprehensive Metabolic Panel; Future  - Lipid Panel; Future  - Albumin/Creatinine Ratio, Urine; Future    4. Colon cancer screening  Referral placed  - [facility] Gerhard Gutierrez MD, Gastroenterology, [facility] [place]    5. Prostate cancer screening  Check PSA  - PSA, Prostatic Specific Antigen (Schuyler Kylah); Future      Return in about 3 months (around 2025) for dm f/u.    Subjective       HPI    Retired. He won the [employer]    Diabetes  Hemoglobin A1C   Date Value Ref Range Status   2024 7.9 % Final   On Janumet    HLD  Pravastatin 40mg  Lab Results   Component Value Date     (H) 12/15/2023   No CP or SOB    HTN  BP Readings from Last 3 Encounters:   25 136/80   24 124/80   24 (!) 134/90     Hx of DVT x2  - on warfarin and goes to coumadin clinic regularly    Review of Systems            Objective     /80   Pulse 93   Resp 16   Ht 1.93 m (6' 4\")   Wt 94.3 kg (208 lb)   SpO2 98%   BMI 25.32 kg/m²    Wt Readings from Last 3 Encounters:   25 94.3 kg (208 lb)   24 93.9 kg (207 lb)   24 94.5 kg (208 lb 6.4 oz)     BP Readings from Last 3

## 2025-04-16 DIAGNOSIS — E11.9 TYPE 2 DIABETES MELLITUS WITHOUT COMPLICATION, WITHOUT LONG-TERM CURRENT USE OF INSULIN: ICD-10-CM

## 2025-04-16 RX ORDER — OLMESARTAN MEDOXOMIL 20 MG/1
20 TABLET ORAL DAILY
Qty: 30 TABLET | Refills: 0 | Status: SHIPPED | OUTPATIENT
Start: 2025-04-16 | End: 2025-04-18

## 2025-04-18 DIAGNOSIS — E11.9 TYPE 2 DIABETES MELLITUS WITHOUT COMPLICATION, WITHOUT LONG-TERM CURRENT USE OF INSULIN: ICD-10-CM

## 2025-04-18 RX ORDER — OLMESARTAN MEDOXOMIL 20 MG/1
20 TABLET ORAL DAILY
Qty: 30 TABLET | Refills: 0 | Status: SHIPPED | OUTPATIENT
Start: 2025-04-18

## 2025-04-24 ENCOUNTER — ANTI-COAG VISIT (OUTPATIENT)
Dept: PHARMACY | Age: 60
End: 2025-04-24

## 2025-04-24 DIAGNOSIS — Z86.718 HISTORY OF DVT (DEEP VEIN THROMBOSIS): Primary | ICD-10-CM

## 2025-04-24 LAB
INTERNATIONAL NORMALIZATION RATIO, POC: 2.6
PROTHROMBIN TIME, POC: 0

## 2025-04-24 PROCEDURE — 99211 OFF/OP EST MAY X REQ PHY/QHP: CPT

## 2025-04-24 PROCEDURE — 85610 PROTHROMBIN TIME: CPT

## 2025-04-24 NOTE — PROGRESS NOTES
Don Browning is a 59 y.o. here for warfarin management.  Don had an INR test today. Results were reviewed and appropriate warfarin management was completed.     Patient verifies current warfarin dosing regimen: Yes     Warfarin medication reviewed and updated on the patient 's home medication list: Yes   All other medications reviewed and updated on the patient 's home medication list: Yes     Lab Results   Component Value Date    INR 2.6 2025    INR 2.1 2025    INR 2.2 2025       Anticoagulation Summary  As of 2025      INR goal:  2.0-3.0   TTR:  47.6% (2.9 y)   INR used for dosin.6 (2025)   Warfarin maintenance plan:  5 mg (10 mg x 0.5) every day   Weekly warfarin total:  35 mg   Plan last modified:  Irish Arellano RPH (2025)   Next INR check:  2025   Priority:  Maintenance   Target end date:  Indefinite    Indications    History of DVT (deep vein thrombosis) x2 [Z86.718]                 Anticoagulation Episode Summary       INR check location:  --    Preferred lab:  --    Send INR reminders to:  [facility] STAFF    Comments:  EPIC          Anticoagulation Care Providers       Provider Role Specialty Phone number    Cole Juarez MD Referring Family Medicine [phone]          There were no vitals taken for this visit.    Warfarin assessment / plan:     Patient appears well today.      No changes affecting warfarin therapy were noted.   No acute findings with regards to warfarin therapy.  INR today is within goal range.     Instructed to continue the same weekly warfarin dose.    See in 4 weeks    Description    CONTINUE: Warfarin 5 mg (1/2 tablet) daily     Call [phone] with signs or symptoms of bleeding or ANY medication changes (including antibiotics, steroids, over-the-counter medications or herbal supplements).       If significant bleeding occurs or if you fall and hit your head, please seek immediate medical attention.    Keep the number

## 2025-05-22 ENCOUNTER — APPOINTMENT (OUTPATIENT)
Dept: PHARMACY | Age: 60
End: 2025-05-22

## 2025-05-22 DIAGNOSIS — Z86.718 HISTORY OF DVT (DEEP VEIN THROMBOSIS): Primary | ICD-10-CM

## 2025-05-30 ENCOUNTER — ANTI-COAG VISIT (OUTPATIENT)
Dept: PHARMACY | Age: 60
End: 2025-05-30

## 2025-05-30 DIAGNOSIS — E11.65 UNCONTROLLED TYPE 2 DIABETES MELLITUS WITH HYPERGLYCEMIA (HCC): ICD-10-CM

## 2025-05-30 DIAGNOSIS — Z86.718 HISTORY OF DVT (DEEP VEIN THROMBOSIS): Primary | ICD-10-CM

## 2025-05-30 DIAGNOSIS — E11.9 TYPE 2 DIABETES MELLITUS WITHOUT COMPLICATION, WITHOUT LONG-TERM CURRENT USE OF INSULIN (HCC): ICD-10-CM

## 2025-05-30 LAB
INTERNATIONAL NORMALIZATION RATIO, POC: 2.5
PROTHROMBIN TIME, POC: 0

## 2025-05-30 PROCEDURE — 99211 OFF/OP EST MAY X REQ PHY/QHP: CPT | Performed by: PHARMACIST

## 2025-05-30 PROCEDURE — 85610 PROTHROMBIN TIME: CPT | Performed by: PHARMACIST

## 2025-05-30 RX ORDER — SITAGLIPTIN AND METFORMIN HYDROCHLORIDE 1000; 100 MG/1; MG/1
1 TABLET, FILM COATED, EXTENDED RELEASE ORAL DAILY
Qty: 90 TABLET | Refills: 1 | Status: SHIPPED | OUTPATIENT
Start: 2025-05-30

## 2025-05-30 RX ORDER — METFORMIN HYDROCHLORIDE 500 MG/1
500 TABLET, EXTENDED RELEASE ORAL 2 TIMES DAILY WITH MEALS
Qty: 180 TABLET | Refills: 1 | OUTPATIENT
Start: 2025-05-30

## 2025-05-30 NOTE — PROGRESS NOTES
Don Browning is a 59 y.o. here for warfarin management.  Don had an INR test today. Results were reviewed and appropriate warfarin management was completed.     Patient verifies current warfarin dosing regimen: Yes     Warfarin medication reviewed and updated on the patient 's home medication list: Yes   All other medications reviewed and updated on the patient 's home medication list: Yes     Lab Results   Component Value Date    INR 2.5 2025    INR 2.6 2025    INR 2.1 2025     Patient Findings       Negatives:  Signs/symptoms of thrombosis, Signs/symptoms of bleeding, Laboratory test error suspected, Change in health, Change in alcohol use, Change in activity, Upcoming invasive procedure, Emergency department visit, Upcoming dental procedure, Missed doses, Extra doses, Change in medications, Change in diet/appetite, Hospital admission, Bruising, Other complaints          Anticoagulation Summary  As of 2025      INR goal:  2.0-3.0   TTR:  49.4% (3 y)   INR used for dosin.5 (2025)   Warfarin maintenance plan:  5 mg (10 mg x 0.5) every day   Weekly warfarin total:  35 mg   Plan last modified:  [facility] (2025)   Next INR check:  2025   Priority:  Maintenance   Target end date:  Indefinite    Indications    History of DVT (deep vein thrombosis) x2 [Z86.718]                 Anticoagulation Episode Summary       INR check location:  --    Preferred lab:  --    Send INR reminders to:  [facility]    Comments:  EPIC  Consents signed 25          Anticoagulation Care Providers       Provider Role Specialty Phone number    Cole Juarez MD Referring Family Medicine [phone]          There were no vitals taken for this visit.    Warfarin assessment / plan:     Patient appears well today.      No changes affecting warfarin therapy were noted.   No acute findings with regards to warfarin therapy.  INR today is within goal range.     Instructed

## 2025-06-02 ENCOUNTER — TELEPHONE (OUTPATIENT)
Dept: FAMILY MEDICINE CLINIC | Age: 60
End: 2025-06-02

## 2025-06-02 RX ORDER — METFORMIN HYDROCHLORIDE 500 MG/1
500 TABLET, EXTENDED RELEASE ORAL 2 TIMES DAILY
Qty: 180 TABLET | Refills: 1 | Status: SHIPPED | OUTPATIENT
Start: 2025-06-02

## 2025-06-02 NOTE — TELEPHONE ENCOUNTER
Pt called in stated the janumet that was sent in cost $900 he states he wants to just take the metformin if that is ok?  Please advise

## 2025-06-02 NOTE — TELEPHONE ENCOUNTER
Okay to take metformin only.  Let me know if needing a refill of this.  Due for labs which are ordered.  Thank you

## 2025-06-10 ENCOUNTER — APPOINTMENT (OUTPATIENT)
Dept: CT IMAGING | Age: 60
End: 2025-06-10
Payer: COMMERCIAL

## 2025-06-10 ENCOUNTER — HOSPITAL ENCOUNTER (EMERGENCY)
Age: 60
Discharge: HOME | End: 2025-06-10
Payer: COMMERCIAL

## 2025-06-10 VITALS
TEMPERATURE: 97.9 F | RESPIRATION RATE: 15 BRPM | HEART RATE: 92 BPM | BODY MASS INDEX: 25.78 KG/M2 | HEIGHT: 74 IN | SYSTOLIC BLOOD PRESSURE: 136 MMHG | WEIGHT: 200.84 LBS | OXYGEN SATURATION: 98 % | DIASTOLIC BLOOD PRESSURE: 90 MMHG

## 2025-06-10 DIAGNOSIS — Z86.718 HISTORY OF DEEP VEIN THROMBOSIS: ICD-10-CM

## 2025-06-10 DIAGNOSIS — R55 VASOVAGAL REACTION: Primary | ICD-10-CM

## 2025-06-10 LAB
ALBUMIN SERPL-MCNC: 4.5 G/DL (ref 3.4–5)
ALBUMIN/GLOB SERPL: 2 {RATIO} (ref 1.1–2.2)
ALP SERPL-CCNC: 60 U/L (ref 40–129)
ALT SERPL-CCNC: 14 U/L (ref 10–40)
ANION GAP SERPL CALCULATED.3IONS-SCNC: 11 MMOL/L (ref 3–16)
APTT BLD: 21.9 SEC (ref 22.8–35.8)
AST SERPL-CCNC: 21 U/L (ref 15–37)
BASOPHILS # BLD: 0.1 K/UL (ref 0–0.2)
BASOPHILS NFR BLD: 0.9 %
BILIRUB SERPL-MCNC: <0.2 MG/DL (ref 0–1)
BILIRUB UR QL STRIP.AUTO: NEGATIVE
BUN SERPL-MCNC: 19 MG/DL (ref 7–20)
CALCIUM SERPL-MCNC: 9.7 MG/DL (ref 8.3–10.6)
CHLORIDE SERPL-SCNC: 100 MMOL/L (ref 99–110)
CLARITY UR: CLEAR
CO2 SERPL-SCNC: 24 MMOL/L (ref 21–32)
COLOR UR: YELLOW
CREAT SERPL-MCNC: 1 MG/DL (ref 0.9–1.3)
DEPRECATED RDW RBC AUTO: 12.8 % (ref 12.4–15.4)
EOSINOPHIL # BLD: 0.1 K/UL (ref 0–0.6)
EOSINOPHIL NFR BLD: 1.1 %
GFR SERPLBLD CREATININE-BSD FMLA CKD-EPI: 87 ML/MIN/{1.73_M2}
GLUCOSE BLD-MCNC: 237 MG/DL (ref 70–99)
GLUCOSE SERPL-MCNC: 260 MG/DL (ref 70–99)
GLUCOSE UR STRIP.AUTO-MCNC: >=1000 MG/DL
HCT VFR BLD AUTO: 42.5 % (ref 40.5–52.5)
HGB BLD-MCNC: 14.4 G/DL (ref 13.5–17.5)
HGB UR QL STRIP.AUTO: NEGATIVE
INR PPP: 1.65 (ref 0.86–1.14)
KETONES UR STRIP.AUTO-MCNC: ABNORMAL MG/DL
LACTATE BLDV-SCNC: 2.4 MMOL/L (ref 0.4–1.9)
LACTATE BLDV-SCNC: 2.5 MMOL/L (ref 0.4–1.9)
LEUKOCYTE ESTERASE UR QL STRIP.AUTO: NEGATIVE
LYMPHOCYTES # BLD: 1.3 K/UL (ref 1–5.1)
LYMPHOCYTES NFR BLD: 13.2 %
MCH RBC QN AUTO: 32.4 PG (ref 26–34)
MCHC RBC AUTO-ENTMCNC: 33.8 G/DL (ref 31–36)
MCV RBC AUTO: 95.8 FL (ref 80–100)
MONOCYTES # BLD: 1 K/UL (ref 0–1.3)
MONOCYTES NFR BLD: 9.6 %
NEUTROPHILS # BLD: 7.5 K/UL (ref 1.7–7.7)
NEUTROPHILS NFR BLD: 75.2 %
NITRITE UR QL STRIP.AUTO: NEGATIVE
PERFORMED ON: ABNORMAL
PH UR STRIP.AUTO: 6 [PH] (ref 5–8)
PLATELET # BLD AUTO: 163 K/UL (ref 135–450)
PMV BLD AUTO: 9 FL (ref 5–10.5)
POTASSIUM SERPL-SCNC: 4.2 MMOL/L (ref 3.5–5.1)
PROT SERPL-MCNC: 6.8 G/DL (ref 6.4–8.2)
PROT UR STRIP.AUTO-MCNC: NEGATIVE MG/DL
PROTHROMBIN TIME: 19.6 SEC (ref 12.1–14.9)
RBC # BLD AUTO: 4.44 M/UL (ref 4.2–5.9)
SODIUM SERPL-SCNC: 135 MMOL/L (ref 136–145)
SP GR UR STRIP.AUTO: 1.03 (ref 1–1.03)
TROPONIN, HIGH SENSITIVITY: <6 NG/L (ref 0–22)
TROPONIN, HIGH SENSITIVITY: <6 NG/L (ref 0–22)
UA COMPLETE W REFLEX CULTURE PNL UR: ABNORMAL
UA DIPSTICK W REFLEX MICRO PNL UR: ABNORMAL
URN SPEC COLLECT METH UR: ABNORMAL
UROBILINOGEN UR STRIP-ACNC: 0.2 E.U./DL
WBC # BLD AUTO: 10 K/UL (ref 4–11)

## 2025-06-10 PROCEDURE — 6360000004 HC RX CONTRAST MEDICATION: Performed by: NURSE PRACTITIONER

## 2025-06-10 PROCEDURE — 36415 COLL VENOUS BLD VENIPUNCTURE: CPT

## 2025-06-10 PROCEDURE — 81003 URINALYSIS AUTO W/O SCOPE: CPT

## 2025-06-10 PROCEDURE — 85730 THROMBOPLASTIN TIME PARTIAL: CPT

## 2025-06-10 PROCEDURE — 83605 ASSAY OF LACTIC ACID: CPT

## 2025-06-10 PROCEDURE — 80053 COMPREHEN METABOLIC PANEL: CPT

## 2025-06-10 PROCEDURE — 99285 EMERGENCY DEPT VISIT HI MDM: CPT

## 2025-06-10 PROCEDURE — 96360 HYDRATION IV INFUSION INIT: CPT

## 2025-06-10 PROCEDURE — 85610 PROTHROMBIN TIME: CPT

## 2025-06-10 PROCEDURE — 71260 CT THORAX DX C+: CPT

## 2025-06-10 PROCEDURE — 84484 ASSAY OF TROPONIN QUANT: CPT

## 2025-06-10 PROCEDURE — 93005 ELECTROCARDIOGRAM TRACING: CPT | Performed by: STUDENT IN AN ORGANIZED HEALTH CARE EDUCATION/TRAINING PROGRAM

## 2025-06-10 PROCEDURE — 2580000003 HC RX 258: Performed by: NURSE PRACTITIONER

## 2025-06-10 PROCEDURE — 85025 COMPLETE CBC W/AUTO DIFF WBC: CPT

## 2025-06-10 RX ORDER — IOPAMIDOL 755 MG/ML
75 INJECTION, SOLUTION INTRAVASCULAR
Status: COMPLETED | OUTPATIENT
Start: 2025-06-10 | End: 2025-06-10

## 2025-06-10 RX ORDER — 0.9 % SODIUM CHLORIDE 0.9 %
1000 INTRAVENOUS SOLUTION INTRAVENOUS ONCE
Status: COMPLETED | OUTPATIENT
Start: 2025-06-10 | End: 2025-06-10

## 2025-06-10 RX ADMIN — IOPAMIDOL 75 ML: 755 INJECTION, SOLUTION INTRAVENOUS at 20:49

## 2025-06-10 RX ADMIN — SODIUM CHLORIDE 1000 ML: 0.9 INJECTION, SOLUTION INTRAVENOUS at 21:07

## 2025-06-10 ASSESSMENT — PAIN SCALES - GENERAL: PAINLEVEL_OUTOF10: 8

## 2025-06-10 ASSESSMENT — PAIN - FUNCTIONAL ASSESSMENT: PAIN_FUNCTIONAL_ASSESSMENT: 0-10

## 2025-06-10 ASSESSMENT — LIFESTYLE VARIABLES
HOW MANY STANDARD DRINKS CONTAINING ALCOHOL DO YOU HAVE ON A TYPICAL DAY: 3 OR 4
HOW OFTEN DO YOU HAVE A DRINK CONTAINING ALCOHOL: 2-4 TIMES A MONTH

## 2025-06-10 ASSESSMENT — PAIN DESCRIPTION - LOCATION: LOCATION: NECK

## 2025-06-10 ASSESSMENT — PAIN DESCRIPTION - PAIN TYPE: TYPE: CHRONIC PAIN

## 2025-06-10 ASSESSMENT — PAIN DESCRIPTION - DESCRIPTORS: DESCRIPTORS: ACHING

## 2025-06-10 ASSESSMENT — PAIN DESCRIPTION - FREQUENCY: FREQUENCY: CONTINUOUS

## 2025-06-10 NOTE — ED TRIAGE NOTES
Verified patient's name and .    Patient arrived in [facility] from restaurant via [facility] for generalized weakness.    EMS reports acute generalized weakness approx at 1830 after drinking 1.5 connie. He felt dizzy, lightheaded, sweaty. EMS reports that he was \"kind of out of it\". He had a vicodin at noon after eating.    BP 90/62 initially, repeat 156/113  190 blood sugar  100 heart rate  Negative 12 lead    GCS 15 upon ER arrival, A & O x4, baseline neck pain 8/10.    BEFAST negative.

## 2025-06-11 LAB
EKG ATRIAL RATE: 99 BPM
EKG DIAGNOSIS: NORMAL
EKG P AXIS: 48 DEGREES
EKG P-R INTERVAL: 186 MS
EKG Q-T INTERVAL: 336 MS
EKG QRS DURATION: 98 MS
EKG QTC CALCULATION (BAZETT): 431 MS
EKG R AXIS: 12 DEGREES
EKG T AXIS: 50 DEGREES
EKG VENTRICULAR RATE: 99 BPM

## 2025-06-11 PROCEDURE — 93010 ELECTROCARDIOGRAM REPORT: CPT | Performed by: STUDENT IN AN ORGANIZED HEALTH CARE EDUCATION/TRAINING PROGRAM

## 2025-06-11 NOTE — ED NOTES
D/C: Order noted for d/c. Pt confirmed d/c paperwork has correct name. Discharge and education instructions reviewed with patient. Teach-back successful.  Pt verbalized understanding and denied questions at this time. No acute distress noted. Patient instructed to follow-up as noted - return to emergency department if symptoms worsen. Patient verbalized understanding. Discharged per EDMD with discharge instructions. Pt discharged to private vehicle. Patient stable upon departure. Thanked patient for [facility] for care. Provider aware of patient pain at time of discharge.

## 2025-06-11 NOTE — DISCHARGE INSTRUCTIONS
Return to Emergency Department for new or worsening symptoms.      Follow-up with your primary care provider for reevaluation next 1 to 2 days.

## 2025-06-11 NOTE — ED PROVIDER NOTES
[facility] EMERGENCY DEPARTMENT  EMERGENCY DEPARTMENT ENCOUNTER        Pt Name: Don Browning  MRN: [id number]  Birthdate 1965  Date of evaluation: 6/10/2025  Provider: HALEY Moe - BEBO  PCP: Cole Juarez MD  Note Started: 11:08 PM EDT 6/10/25      ROSA MARIA. I have evaluated this patient.        CHIEF COMPLAINT       Chief Complaint   Patient presents with    Fatigue       HISTORY OF PRESENT ILLNESS: 1 or more Elements     History From: Patient  Limitations to history : None    Don Browning is a 59 y.o. nontoxic, well-appearing male with a medical history including but not limited to, type 2 diabetes mellitus, pyelonephritis of right kidney, DVT, vasovagal response/syncope, and essential hypertension, who presents to the emergency department for evaluation brought by EMS status post patient states he was sitting having a drink (1.5 margaritas and had a vicodin earlier) when he began to feel fatigued, generally weak, diaphoretic, and lightheadedness.  EMS was called and patient's initial systolic blood pressure was 90 and POCT glucose was 190 mg/dL as obtained by EMS.  Denies chest pain, nausea, vomiting, dizziness, unilateral extremity weakness, shortness of breath, fever, chills, cough, change in ability to smell/taste, mopped assist, leg/calf pain or swelling, headaches, bodyaches, congestion, abdominal pain, diarrhea, urinary symptoms/retention, other symptoms/concerns.     Nursing Notes were all reviewed and agreed with or any disagreements were addressed in the HPI.    REVIEW OF SYSTEMS :      Review of Systems   Constitutional:  Positive for chills, diaphoresis and fatigue. Negative for fever.   HENT:  Negative for congestion and sore throat.    Eyes:  Negative for pain and visual disturbance.   Respiratory:  Negative for cough and shortness of breath.    Cardiovascular:  Negative for chest pain and leg swelling.   Gastrointestinal:  Negative for abdominal pain, anal bleeding, diarrhea,

## 2025-06-12 ENCOUNTER — OFFICE VISIT (OUTPATIENT)
Dept: FAMILY MEDICINE CLINIC | Age: 60
End: 2025-06-12
Payer: COMMERCIAL

## 2025-06-12 VITALS
DIASTOLIC BLOOD PRESSURE: 80 MMHG | BODY MASS INDEX: 26.06 KG/M2 | HEART RATE: 86 BPM | WEIGHT: 203 LBS | RESPIRATION RATE: 18 BRPM | SYSTOLIC BLOOD PRESSURE: 136 MMHG | OXYGEN SATURATION: 100 %

## 2025-06-12 DIAGNOSIS — R55 VASOVAGAL REACTION: Primary | ICD-10-CM

## 2025-06-12 DIAGNOSIS — R73.9 HYPERGLYCEMIA: ICD-10-CM

## 2025-06-12 DIAGNOSIS — Z86.718 HISTORY OF DVT (DEEP VEIN THROMBOSIS): ICD-10-CM

## 2025-06-12 PROCEDURE — 3075F SYST BP GE 130 - 139MM HG: CPT | Performed by: FAMILY MEDICINE

## 2025-06-12 PROCEDURE — 99214 OFFICE O/P EST MOD 30 MIN: CPT | Performed by: FAMILY MEDICINE

## 2025-06-12 PROCEDURE — 3079F DIAST BP 80-89 MM HG: CPT | Performed by: FAMILY MEDICINE

## 2025-06-12 RX ORDER — WARFARIN SODIUM 10 MG/1
TABLET ORAL
Qty: 30 TABLET | Refills: 3 | Status: SHIPPED | OUTPATIENT
Start: 2025-06-12

## 2025-06-12 ASSESSMENT — ENCOUNTER SYMPTOMS
DIARRHEA: 0
SORE THROAT: 0
SHORTNESS OF BREATH: 0
NAUSEA: 0
EYE PAIN: 0
ABDOMINAL PAIN: 0
COUGH: 0
BACK PAIN: 0
ANAL BLEEDING: 0
VOMITING: 0

## 2025-06-12 NOTE — PROGRESS NOTES
Don Browning (:  1965) is a 59 y.o. male,Established patient, here for evaluation of the following chief complaint(s):  Follow-up (Pt was seen in the ED for \"Fatigue\" 6/10/25 /Pt feels great now )      1. Vasovagal reaction  History consistent with this.  No syncope.  He was evaluated in the ER, workup reviewed in detail.  He has a history of this in the past.  We discussed ways of mitigating this risk in the future    2. Hyperglycemia  Discussed prior A1c, medications, diet    3. History of DVT (deep vein thrombosis) x2  Remains on warfarin followed by the Coumadin clinic  - warfarin (COUMADIN) 10 MG tablet; TAKE 1/2 TABLET BY MOUTH DAILY. EXCEPT 1 TABLET ON MONDAY, WEDNESDAY, FRIDAY OR AS DIRECTED BY [facility]  Dispense: 30 tablet; Refill: 3    Follow-up as scheduled for next diabetes appointment    Subjective       HPI    Here for ED f/u    He was sitting on the deck. Had a connie. Started feeling sweaty, light-headed, nauseous. His sister who is a nurse noted he didn't look right. He felt the urge to go to the bathroom. His sister called EMT. They took him inside - he felt very zoned out.   - BP was noted to be low (90/40)    He notes prior similar pre-syncopal symptoms (about 3x over the past 10 years)    In the ED, CT-PE was negative. Lab workup was notable for normal troponin level  - he was hyperglycemia with glucosuria   Hemoglobin A1C   Date Value Ref Range Status   2024 7.9 % Final       Review of Systems            Objective     /80   Pulse 86   Resp 18   Wt 92.1 kg (203 lb)   SpO2 100%   BMI 26.06 kg/m²    Wt Readings from Last 3 Encounters:   25 92.1 kg (203 lb)   06/10/25 91.1 kg (200 lb 13.4 oz)   25 94.3 kg (208 lb)     BP Readings from Last 3 Encounters:   25 136/80   06/10/25 (!) 136/90   25 136/80     Physical Exam  Constitutional:       Appearance: He is well-developed.   HENT:      Head: Normocephalic and atraumatic.

## 2025-06-27 ENCOUNTER — ANTI-COAG VISIT (OUTPATIENT)
Dept: PHARMACY | Age: 60
End: 2025-06-27
Payer: COMMERCIAL

## 2025-06-27 DIAGNOSIS — Z86.718 HISTORY OF DVT (DEEP VEIN THROMBOSIS): Primary | ICD-10-CM

## 2025-06-27 LAB
INR BLD: 2.3
PROTIME: NORMAL

## 2025-06-27 PROCEDURE — 85610 PROTHROMBIN TIME: CPT

## 2025-06-27 PROCEDURE — 99211 OFF/OP EST MAY X REQ PHY/QHP: CPT

## 2025-06-27 NOTE — PROGRESS NOTES
Don Browning is a 59 y.o. here for warfarin management.  Don had an INR test today. Results were reviewed and appropriate warfarin management was completed.     Patient verifies current warfarin dosing regimen: Yes     Warfarin medication reviewed and updated on the patient 's home medication list: Yes   All other medications reviewed and updated on the patient 's home medication list: No new medications     Lab Results   Component Value Date    INR 2.30 2025    INR 1.65 (H) 06/10/2025    INR 2.5 2025     Patient Findings       Negatives:  Signs/symptoms of thrombosis, Signs/symptoms of bleeding, Missed doses, Change in medications, Change in diet/appetite, Bruising          Anticoagulation Summary  As of 2025      INR goal:  2.0-3.0   TTR:  49.4% (3.1 y)   INR used for dosin.30 (2025)   Warfarin maintenance plan:  5 mg (10 mg x 0.5) every day   Weekly warfarin total:  35 mg   Plan last modified:  [facility]ll[facility] (2025)   Next INR check:  2025   Priority:  Maintenance   Target end date:  Indefinite    Indications    History of DVT (deep vein thrombosis) x2 [Z86.718]                 Anticoagulation Episode Summary       INR check location:  --    Preferred lab:  --    Send INR reminders to:  [facility]    Comments:  EPIC  Consents signed 25          Anticoagulation Care Providers       Provider Role Specialty Phone number    Cole Juarez MD Referring Family Medicine [phone]          There were no vitals taken for this visit.    Warfarin assessment / plan:     Patient appears well today.      No changes affecting warfarin therapy were noted.   No acute findings with regards to warfarin therapy.  INR today is within goal range.     Instructed to continue the same weekly warfarin dose.    INR in goal range = 2.3     Instructed him to continue warfarin 5 mg (1/2 tablet) daily.     Next INR in 4 weeks.     Description    CONTINUE: Warfarin 5 mg

## 2025-07-25 ENCOUNTER — ANTI-COAG VISIT (OUTPATIENT)
Dept: PHARMACY | Age: 60
End: 2025-07-25
Payer: COMMERCIAL

## 2025-07-25 DIAGNOSIS — Z86.718 HISTORY OF DVT (DEEP VEIN THROMBOSIS): Primary | ICD-10-CM

## 2025-07-25 LAB
INTERNATIONAL NORMALIZATION RATIO, POC: 2
PROTHROMBIN TIME, POC: 0

## 2025-07-25 PROCEDURE — 99211 OFF/OP EST MAY X REQ PHY/QHP: CPT

## 2025-07-25 PROCEDURE — 85610 PROTHROMBIN TIME: CPT

## 2025-07-25 NOTE — PROGRESS NOTES
over-the-counter medications or herbal supplements).       If significant bleeding occurs or if you fall and hit your head, please seek immediate medical attention.    Keep the number of servings of vitamin K containing foods (dark green, leafy vegetables) the same each week. Please call if this changes.    Try and intake 1-2 servings of green leafy vegetables per week     Limit alcohol intake. Please call if this changes.         Immunization History   Administered Date(s) Administered    COVID-19, PFIZER PURPLE top, DILUTE for use, (age 12 y+), 30mcg/0.3mL 2021, 2021    TDaP, ADACEL (age 10y-64y), BOOSTRIX (age 10y+), IM, 0.5mL 2019    Td vaccine (adult) 2015       Orders Placed This Encounter   Procedures    POCT INR     This order was created through the anticoagulation tracking navigator section.      No orders of the defined types were placed in this encounter.     Reviewed AVS with patient / caregiver.    Billing Points:  BASIC ASSESSMENT UNCOMPLICATED (including but not limited to: vital signs; physical assessment screening; review of secondary markers like lab results, allergies, home readings; instructions on treatment plan and basic education; assessment of medication list with one med change and compliance) - 2 points     For Pharmacy Admin Tracking Only    Intervention Detail: Adherence Monitorin  Total # of Interventions Recommended: 0  Total # of Interventions Accepted: 0  Time Spent (min): 10

## 2025-08-22 ENCOUNTER — APPOINTMENT (OUTPATIENT)
Dept: PHARMACY | Age: 60
End: 2025-08-22
Payer: COMMERCIAL

## 2025-08-25 ENCOUNTER — ANTI-COAG VISIT (OUTPATIENT)
Dept: PHARMACY | Age: 60
End: 2025-08-25
Payer: COMMERCIAL

## 2025-08-25 DIAGNOSIS — Z86.718 HISTORY OF DVT (DEEP VEIN THROMBOSIS): Primary | ICD-10-CM

## 2025-08-25 LAB
INR BLD: 2.2
PROTIME: NORMAL

## 2025-08-25 PROCEDURE — 85610 PROTHROMBIN TIME: CPT

## 2025-08-25 PROCEDURE — 99211 OFF/OP EST MAY X REQ PHY/QHP: CPT

## 2025-08-25 RX ORDER — M-VIT,TX,IRON,MINS/CALC/FOLIC 27MG-0.4MG
1 TABLET ORAL DAILY
COMMUNITY

## 2025-09-04 RX ORDER — METFORMIN HYDROCHLORIDE 500 MG/1
TABLET, EXTENDED RELEASE ORAL
Qty: 180 TABLET | Refills: 1 | Status: SHIPPED | OUTPATIENT
Start: 2025-09-04

## (undated) DEVICE — GOWN SIRUS NONREIN XL W/TWL: Brand: MEDLINE INDUSTRIES, INC.

## (undated) DEVICE — SOLUTION IRRIG 3000ML STRL H2O USP UROMATIC PLAS CONT

## (undated) DEVICE — SYRINGE MED 10ML LUERLOCK TIP W/O SFTY DISP

## (undated) DEVICE — SOLUTION IV IRRIG WATER 1000ML POUR BRL 2F7114

## (undated) DEVICE — SOLUTION SCRB 4OZ 10% POVIDONE IOD ANTIMIC BTL

## (undated) DEVICE — CYSTOSCOPY: Brand: MEDLINE INDUSTRIES, INC.

## (undated) DEVICE — GLOVE SURG SZ 75 CRM LTX FREE POLYISOPRENE POLYMER BEAD ANTI

## (undated) DEVICE — PROVE COVER: Brand: UNBRANDED

## (undated) DEVICE — SYRINGE,TOOMEY,IRRIGATION,70CC,STERILE: Brand: MEDLINE